# Patient Record
Sex: MALE | Race: WHITE | Employment: FULL TIME | URBAN - METROPOLITAN AREA
[De-identification: names, ages, dates, MRNs, and addresses within clinical notes are randomized per-mention and may not be internally consistent; named-entity substitution may affect disease eponyms.]

---

## 2022-06-23 ENCOUNTER — APPOINTMENT (OUTPATIENT)
Dept: CT IMAGING | Age: 57
DRG: 045 | End: 2022-06-23
Payer: COMMERCIAL

## 2022-06-23 ENCOUNTER — HOSPITAL ENCOUNTER (EMERGENCY)
Age: 57
Discharge: LEFT BEFORE DISCHARGE | DRG: 045 | End: 2022-06-23
Attending: EMERGENCY MEDICINE
Payer: COMMERCIAL

## 2022-06-23 ENCOUNTER — APPOINTMENT (OUTPATIENT)
Dept: GENERAL RADIOLOGY | Age: 57
DRG: 045 | End: 2022-06-23
Payer: COMMERCIAL

## 2022-06-23 VITALS
TEMPERATURE: 98 F | WEIGHT: 155 LBS | RESPIRATION RATE: 16 BRPM | HEART RATE: 80 BPM | OXYGEN SATURATION: 98 % | HEIGHT: 69 IN | BODY MASS INDEX: 22.96 KG/M2 | DIASTOLIC BLOOD PRESSURE: 73 MMHG | SYSTOLIC BLOOD PRESSURE: 155 MMHG

## 2022-06-23 DIAGNOSIS — R29.898 LEFT ARM WEAKNESS: ICD-10-CM

## 2022-06-23 DIAGNOSIS — R91.8 LUNG MASS: ICD-10-CM

## 2022-06-23 DIAGNOSIS — I63.511 CEREBROVASCULAR ACCIDENT (CVA) DUE TO OCCLUSION OF RIGHT MIDDLE CEREBRAL ARTERY (HCC): Primary | ICD-10-CM

## 2022-06-23 LAB
ALBUMIN SERPL-MCNC: 4.2 GM/DL (ref 3.4–5)
ALP BLD-CCNC: 66 IU/L (ref 40–129)
ALT SERPL-CCNC: 20 U/L (ref 10–40)
ANION GAP SERPL CALCULATED.3IONS-SCNC: 12 MMOL/L (ref 4–16)
AST SERPL-CCNC: 26 IU/L (ref 15–37)
BASOPHILS ABSOLUTE: 0.1 K/CU MM
BASOPHILS RELATIVE PERCENT: 0.7 % (ref 0–1)
BILIRUB SERPL-MCNC: 0.4 MG/DL (ref 0–1)
BUN BLDV-MCNC: 10 MG/DL (ref 6–23)
CALCIUM SERPL-MCNC: 9.5 MG/DL (ref 8.3–10.6)
CHLORIDE BLD-SCNC: 97 MMOL/L (ref 99–110)
CO2: 26 MMOL/L (ref 21–32)
CREAT SERPL-MCNC: 1 MG/DL (ref 0.9–1.3)
DIFFERENTIAL TYPE: ABNORMAL
EKG ATRIAL RATE: 100 BPM
EKG DIAGNOSIS: NORMAL
EKG P AXIS: 75 DEGREES
EKG P-R INTERVAL: 114 MS
EKG Q-T INTERVAL: 364 MS
EKG QRS DURATION: 84 MS
EKG QTC CALCULATION (BAZETT): 469 MS
EKG R AXIS: 69 DEGREES
EKG T AXIS: 60 DEGREES
EKG VENTRICULAR RATE: 100 BPM
EOSINOPHILS ABSOLUTE: 0.1 K/CU MM
EOSINOPHILS RELATIVE PERCENT: 0.9 % (ref 0–3)
GFR AFRICAN AMERICAN: >60 ML/MIN/1.73M2
GFR NON-AFRICAN AMERICAN: >60 ML/MIN/1.73M2
GLUCOSE BLD-MCNC: 101 MG/DL
GLUCOSE BLD-MCNC: 101 MG/DL (ref 70–99)
GLUCOSE BLD-MCNC: 105 MG/DL (ref 70–99)
HCT VFR BLD CALC: 46.3 % (ref 42–52)
HEMOGLOBIN: 15.3 GM/DL (ref 13.5–18)
IMMATURE NEUTROPHIL %: 0.3 % (ref 0–0.43)
INR BLD: 0.95 INDEX
LYMPHOCYTES ABSOLUTE: 1.9 K/CU MM
LYMPHOCYTES RELATIVE PERCENT: 15.9 % (ref 24–44)
MAGNESIUM: 1.9 MG/DL (ref 1.8–2.4)
MCH RBC QN AUTO: 31.7 PG (ref 27–31)
MCHC RBC AUTO-ENTMCNC: 33 % (ref 32–36)
MCV RBC AUTO: 95.9 FL (ref 78–100)
MONOCYTES ABSOLUTE: 1.2 K/CU MM
MONOCYTES RELATIVE PERCENT: 9.9 % (ref 0–4)
NUCLEATED RBC %: 0 %
PDW BLD-RTO: 12.7 % (ref 11.7–14.9)
PLATELET # BLD: 243 K/CU MM (ref 140–440)
PMV BLD AUTO: 9.9 FL (ref 7.5–11.1)
POTASSIUM SERPL-SCNC: 3.3 MMOL/L (ref 3.5–5.1)
PROTHROMBIN TIME: 12.2 SECONDS (ref 11.7–14.5)
RBC # BLD: 4.83 M/CU MM (ref 4.6–6.2)
SEGMENTED NEUTROPHILS ABSOLUTE COUNT: 8.6 K/CU MM
SEGMENTED NEUTROPHILS RELATIVE PERCENT: 72.3 % (ref 36–66)
SODIUM BLD-SCNC: 135 MMOL/L (ref 135–145)
TOTAL IMMATURE NEUTOROPHIL: 0.04 K/CU MM
TOTAL NUCLEATED RBC: 0 K/CU MM
TOTAL PROTEIN: 8.1 GM/DL (ref 6.4–8.2)
TROPONIN T: <0.01 NG/ML
WBC # BLD: 11.8 K/CU MM (ref 4–10.5)

## 2022-06-23 PROCEDURE — 70450 CT HEAD/BRAIN W/O DYE: CPT

## 2022-06-23 PROCEDURE — 71045 X-RAY EXAM CHEST 1 VIEW: CPT

## 2022-06-23 PROCEDURE — 93010 ELECTROCARDIOGRAM REPORT: CPT | Performed by: INTERNAL MEDICINE

## 2022-06-23 PROCEDURE — 99285 EMERGENCY DEPT VISIT HI MDM: CPT

## 2022-06-23 PROCEDURE — 85025 COMPLETE CBC W/AUTO DIFF WBC: CPT

## 2022-06-23 PROCEDURE — 83735 ASSAY OF MAGNESIUM: CPT

## 2022-06-23 PROCEDURE — 6360000004 HC RX CONTRAST MEDICATION: Performed by: EMERGENCY MEDICINE

## 2022-06-23 PROCEDURE — 85610 PROTHROMBIN TIME: CPT

## 2022-06-23 PROCEDURE — 93005 ELECTROCARDIOGRAM TRACING: CPT | Performed by: EMERGENCY MEDICINE

## 2022-06-23 PROCEDURE — 6370000000 HC RX 637 (ALT 250 FOR IP): Performed by: EMERGENCY MEDICINE

## 2022-06-23 PROCEDURE — 82962 GLUCOSE BLOOD TEST: CPT

## 2022-06-23 PROCEDURE — 80053 COMPREHEN METABOLIC PANEL: CPT

## 2022-06-23 PROCEDURE — 70496 CT ANGIOGRAPHY HEAD: CPT

## 2022-06-23 PROCEDURE — 84484 ASSAY OF TROPONIN QUANT: CPT

## 2022-06-23 RX ORDER — SODIUM CHLORIDE 9 MG/ML
INJECTION, SOLUTION INTRAVENOUS PRN
Status: DISCONTINUED | OUTPATIENT
Start: 2022-06-23 | End: 2022-06-23 | Stop reason: HOSPADM

## 2022-06-23 RX ORDER — SODIUM CHLORIDE 0.9 % (FLUSH) 0.9 %
5-40 SYRINGE (ML) INJECTION PRN
Status: DISCONTINUED | OUTPATIENT
Start: 2022-06-23 | End: 2022-06-23 | Stop reason: HOSPADM

## 2022-06-23 RX ORDER — SODIUM CHLORIDE 0.9 % (FLUSH) 0.9 %
5-40 SYRINGE (ML) INJECTION EVERY 12 HOURS SCHEDULED
Status: DISCONTINUED | OUTPATIENT
Start: 2022-06-23 | End: 2022-06-23 | Stop reason: HOSPADM

## 2022-06-23 RX ORDER — ASPIRIN 81 MG/1
324 TABLET, CHEWABLE ORAL ONCE
Status: COMPLETED | OUTPATIENT
Start: 2022-06-23 | End: 2022-06-23

## 2022-06-23 RX ADMIN — IOPAMIDOL 75 ML: 755 INJECTION, SOLUTION INTRAVENOUS at 17:00

## 2022-06-23 RX ADMIN — ASPIRIN 81 MG CHEWABLE TABLET 324 MG: 81 TABLET CHEWABLE at 18:08

## 2022-06-23 ASSESSMENT — PAIN - FUNCTIONAL ASSESSMENT: PAIN_FUNCTIONAL_ASSESSMENT: NONE - DENIES PAIN

## 2022-06-23 NOTE — ED NOTES
Neurologist from [facility] finished with neuro exam at this time. Requesting Dr. Onelia Griffith to bedside.       Natalie Masterson RN  06/23/22 2343

## 2022-06-23 NOTE — ED NOTES
Dr Melonie Gomez at bedside      [facility]  06/23/22 8200

## 2022-06-23 NOTE — ED NOTES
Blood sugar 101     [facility], [address] [facility]  06/23/22 5071

## 2022-06-23 NOTE — ED PROVIDER NOTES
Not on file   Substance and Sexual Activity    Alcohol use: Yes    Drug use: No    Sexual activity: Not on file   Other Topics Concern    Not on file   Social History Narrative    Not on file     Social Determinants of Health     Financial Resource Strain:     Difficulty of Paying Living Expenses: Not on file   Food Insecurity:     Worried About Running Out of Food in the Last Year: Not on file    John of Food in the Last Year: Not on file   Transportation Needs:     Lack of Transportation (Medical): Not on file    Lack of Transportation (Non-Medical): Not on file   Physical Activity:     Days of Exercise per Week: Not on file    Minutes of Exercise per Session: Not on file   Stress:     Feeling of Stress : Not on file   Social Connections:     Frequency of Communication with Friends and Family: Not on file    Frequency of Social Gatherings with Friends and Family: Not on file    Attends Yazidi Services: Not on file    Active Member of [address] [place] [address] or Organizations: Not on file    Attends Club or Organization Meetings: Not on file    Marital Status: Not on file   Intimate Partner Violence:     Fear of Current or Ex-Partner: Not on file    Emotionally Abused: Not on file    Physically Abused: Not on file    Sexually Abused: Not on file   Housing Stability:     Unable to Pay for Housing in the Last Year: Not on file    Number of [place] in the Last Year: Not on file    Unstable Housing in the Last Year: Not on file       ALLERGIES: Patient has no known allergies.     PHYSICAL EXAM:  VITAL SIGNS:   ED Triage Vitals   Enc Vitals Group      BP 06/23/22 1634 (!) 197/117      Heart Rate 06/23/22 1634 (!) 103      Resp 06/23/22 1634 20      Temp 06/23/22 1658 98 °F (36.7 °C)      Temp src --       SpO2 06/23/22 1634 96 %      Weight 06/23/22 1641 155 lb (70.3 kg)      Height 06/23/22 1641 5' 9\" (1.753 m)      Head Circumference --       Peak Flow --       Pain Score --       Pain Loc -- Pain Edu? --       Excl. in [address]? --      Constitutional:  Non-toxic appearance  HENT: Normocephalic, Atraumatic, Bilateral external ears normal, Oropharynx moist, No oral exudates, Nose normal.  Eyes:  PERRL, EOMI, Conjunctiva normal, No discharge. Neck: Normal range of motion, No tenderness, Supple, No stridor, No lymphadenopathy . Cardiovascular:  Normal heart rate, Normal rhythm  Pulmonary/Chest:  Normal breath sounds, No respiratory distress, No wheezing  Abdomen: Bowel sounds normal, Soft, No tenderness, No masses, No pulsatile masses  Extremities:  Normal range of motion, Intact distal pulses, No edema, No tenderness  Skin: Warm, Dry, No rash  Neurological:      Mental Status Exam:   Alert and oriented times three, follows commands, speech and language intact    Cranial Bfcwoy-BE-EMD Intact. Cranial nerve II  Visual acuity: normal  Cranial nerve III  Pupils: equal, round, reactive to light  Cranial nerves III, IV, VI  Extraocular Movements: intact  Cranial nerve V  Facial sensation: intact  Cranial nerve VII  Facial strength: intact  Cranial nerve VIII  Hearing: intact  Cranial nerve IX  Palate: intact  Cranial nerve XI  Shoulder shrug: intact  Cranial nerve XII  Tongue movement: normal    Motor:   Drift: absent  Motor exam: 4 out of 5 strength in the left upper extremity, 5 out of 5 strength in the right upper extremity and bilateral lower extremities  Tone: normal  Abnormal Movements: Absent    NIH Stroke Scale    1a  Level of consciousness: 0=alert; keenly responsive   1b. LOC questions:  0=Performs both tasks correctly   1c. LOC commands: 0=Performs both tasks correctly   2. Best Gaze: 0=normal   3. Visual: 0=No visual loss   4. Facial Palsy: 0=Normal symmetric movement   5a. Motor left arm: 0=No drift, limb holds 90 (or 45) degrees for full 10 seconds   5b.   Motor right arm: 0=No drift, limb holds 90 (or 45) degrees for full 10 seconds   6a. motor left le=No drift, limb holds 90 (or 45) degrees for full 10 seconds   6b  Motor right le=No drift, limb holds 90 (or 45) degrees for full 10 seconds   7. Limb Ataxia: 1=Present in one limb   8. Sensory: 0=Normal; no sensory loss   9. Best Language:  0=No aphasia, normal   10. Dysarthria: 0=Normal   11. Extinction and Inattention: 0=No abnormality   12. Distal motor function: 0=Normal    Total:   1       Sensory:  Light Touch  Right Upper Extremity: normal  Left Upper Extremity: normal  Right Lower Extremity: normal  Left Lower Extremity: normal    EKG Interpretation  Interpreted by me  No prior EKG to compare to  Rhythm: normal sinus  Rate: normal 100  Axis: normal  Ectopy: none  Conduction: normal  ST Segments: normal  T Waves: normal  Clinical Impression: normal sinus rhythm    Cardiac Monitor Strip Interpretation  Interpreted by me  Monitor strip interpreted for greater than 10 seconds  Rhythm: normal sinus  Rate: normal  Ectopy: none  ST Segments: normal      Radiology / Procedures:       XR CHEST PORTABLE (Final result)  Result time 22 17:38:21  Final result by Shawnie Canavan, MD (22 17:38:21)                Impression:    Scarring within both lung apices as well as a masslike airspace opacity   within the right upper lobe. Recommend CT for further evaluation. Narrative:    EXAMINATION:   ONE XRAY VIEW OF THE CHEST     2022 4:49 pm     COMPARISON:   None.      HISTORY:   ORDERING SYSTEM PROVIDED HISTORY: stroke symptoms   TECHNOLOGIST PROVIDED HISTORY:   Reason for exam:->stroke symptoms   Reason for Exam: stroke symptoms     FINDINGS:   There is scarring within both lung apices as well as a 3.9 cm in diameter   masslike airspace opacity within the right upper lobe.  No infiltrate or   effusion is identified.  The heart size is normal.                       CTA HEAD NECK W CONTRAST (Final result)  Result time 22 17:31:25  Final result by Owen Stevens MD (22 17:31:25)                Impression:    Anders Ho acute to subacute infarction in right frontal lobe. 75% stenosis in proximal P2 segment of the left PCA. No acute abnormality or flow-limiting stenosis of the remainder of major   arteries of head and neck. 2.1 cm partially visualized lung mass in the right upper lobe, suspicious for   neoplasm. Narrative:    EXAMINATION:   CTA OF THE HEAD AND NECK WITH CONTRAST 6/23/2022 5:00 pm:     TECHNIQUE:   CTA of the head and neck was performed with the administration of intravenous   contrast. Multiplanar reformatted images are provided for review.  MIP images   are provided for review. Stenosis of the internal carotid arteries measured   using NASCET criteria. Automated exposure control, iterative reconstruction,   and/or weight based adjustment of the mA/kV was utilized to reduce the   radiation dose to as low as reasonably achievable. COMPARISON:   Noncontrast CT head from earlier today     HISTORY:   ORDERING SYSTEM PROVIDED HISTORY: Left arm weakness   TECHNOLOGIST PROVIDED HISTORY:   Has a \"code stroke\" or \"stroke alert\" been called? ->Yes   Reason for exam:->Left arm weakness   Decision Support Exception - unselect if not a suspected or confirmed   emergency medical condition->Emergency Medical Condition (MA)   Reason for Exam: Left arm weakness     FINDINGS:     CTA NECK:     AORTIC ARCH/ARCH VESSELS: No dissection or arterial injury.  No significant   stenosis of the brachiocephalic or subclavian arteries. CAROTID ARTERIES: No dissection, arterial injury, or hemodynamically   significant stenosis by NASCET criteria. VERTEBRAL ARTERIES: No dissection, arterial injury, or significant stenosis.      SOFT TISSUES: There is a 2.1 cm partially visualized lung mass in the right   upper lobe, suspicious for neoplasm.  There is severe emphysema.  There is   bronchial wall thickening, may be related to small airway disease or   bronchiolitis.  No cervical or superior mediastinal lymphadenopathy.  The   larynx and pharynx are unremarkable.  No acute abnormality of the salivary   and thyroid glands. BONES: No acute osseous abnormality.  There are moderate degenerative changes   in the cervical spine. CTA HEAD:     ANTERIOR CIRCULATION: No significant stenosis of the intracranial internal   carotid, anterior cerebral, or middle cerebral arteries. No aneurysm. POSTERIOR CIRCULATION: There is 75% stenosis in proximal P2 segment of the   left PCA.  No significant stenosis of the vertebral, basilar, or posterior   cerebral arteries. No aneurysm. OTHER: No dural venous sinus thrombosis on this non-dedicated study. BRAIN: There is a tiny acute to subacute infarction in right frontal lobe. No mass effect or midline shift. No extra-axial fluid collection.                       CT HEAD WO CONTRAST (Edited Result - FINAL)  Result time 06/23/22 17:32:19  Addendum 1 of 1 by Fernando Pepper MD (06/23/22 17:32:19)    ADDENDUM:  Results were reported to Dr. Aries Sharp at 5:04 p.m. on June 23, 2022.               Final result by Fernando Pepper MD (06/23/22 17:01:17)                Impression:    A tiny acute to subacute infarction in the right frontal lobe in the right   MCA territory. Narrative:    EXAMINATION:   CT OF THE HEAD WITHOUT CONTRAST  6/23/2022 4:52 pm     TECHNIQUE:   CT of the head was performed without the administration of intravenous   contrast. Automated exposure control, iterative reconstruction, and/or weight   based adjustment of the mA/kV was utilized to reduce the radiation dose to as   low as reasonably achievable. COMPARISON:   CT head January 6, 2013     HISTORY:   ORDERING SYSTEM PROVIDED HISTORY: Left arm [facility]   TECHNOLOGIST PROVIDED HISTORY:   Has a \"code stroke\" or \"stroke alert\" been called? ->Yes   Reason for exam:->Left arm [employer]   Decision Support Exception - unselect if not a suspected or confirmed   emergency medical condition->Emergency Medical Condition (MA)   Reason for Exam: Left arm weankess     FINDINGS:   BRAIN/VENTRICLES: There is a tiny acute to subacute infarction in the right   frontal lobe in the right MCA territory (series 2, image 46). There is no acute intracranial hemorrhage, mass effect or midline shift.  No   abnormal extra-axial fluid collection.  The gray-white differentiation is   maintained without evidence of an acute infarct.  There is no evidence of   hydrocephalus. ORBITS: There are postoperative changes along the lateral wall of left orbit. The visualized portion of the orbits demonstrate no acute abnormality. SINUSES: The visualized paranasal sinuses and mastoid air cells demonstrate   no acute abnormality. SOFT TISSUES/SKULL:  No acute abnormality of the visualized skull or soft   tissues.                   Labs Reviewed   CBC WITH AUTO DIFFERENTIAL - Abnormal; Notable for the following components:       Result Value    WBC 11.8 (*)     MCH 31.7 (*)     Segs Relative 72.3 (*)     Lymphocytes % 15.9 (*)     Monocytes % 9.9 (*)     All other components within normal limits   COMPREHENSIVE METABOLIC PANEL W/ REFLEX TO MG FOR LOW K - Abnormal; Notable for the following components:    Potassium 3.3 (*)     Chloride 97 (*)     Glucose 105 (*)     All other components within normal limits   POCT GLUCOSE - Abnormal; Notable for the following components:    POC Glucose 101 (*)     All other components within normal limits   POCT GLUCOSE - Normal   TROPONIN   PROTIME-INR   MAGNESIUM       Stroke alert timing details  1. Physician evaluation performed at: 16:35  2. Stroke alert called at: 16:35  3. CT results obtained at: 17:04  4. Decision to administer tPA at: The patient is not a tPA candidate per stroke neurologist at [facility], Dr. Jennifer Castro, as he is outside the window in which we can administer tPA. ED COURSE & MEDICAL DECISION MAKING:  Pertinent Labs & Imaging studies reviewed.  (See chart for details)  On exam, the patient is afebrile and nontoxic appearing. He is hypertensive but is asymptomatic and this improves without treatment. He is mildly tachycardic on arrival but this resolves without treatment. He is otherwise hemodynamically stable. He has left upper extremity weakness but is otherwise neurologically intact. Point-of-care glucose is 101. Stroke alert was called and he was taken immediately for CT of the brain without contrast as well as CTA of the head and neck. EKG shows a normal sinus rhythm with no ST elevation or depression. Labs are obtained and are significant for mild leukocytosis and mild hypokalemia with no other clinically significant lab abnormalities. CT head without contrast shows a tiny acute to subacute infarction in the right frontal lobe in the right MCA territory. CTA head and neck shows 75% stenosis in the proximal P2 segment of the left PCA. There is no acute abnormality or flow-limiting stenosis in the remainder of the major arteries of the head or neck. The patient was evaluated by the stroke neurologist at [facility], Dr. Av Hill, over the telemetry robot and she states that he is not a tPA candidate as he is outside the window in which we can give tPA. She recommends admission for an MRI and further stroke work-up. The patient was treated with an aspirin. I discussed the results with the patient and recommended admission to which he stated he could not stay as he needed to go home and take care of his dog. He could not find any family to take care of his dog. He states he will come back tomorrow for admission and further work-up once he can make arrangements his dog cared for. The patient is awake, alert, and has the capacity to make medical decisions for himself. I discussed with the patient that he would need to sign out against medical advice.  We discussed that in signing out against medical advice, the patient is assuming all risk of a bad outcome including, but not limited to, worsening symptoms, chest pain, MI, abdominal pain, cardiac arrest, stroke, permanent disability, or death. The patient is able to repeat back to me the risks of signing out against medical advice. We discussed that they are to follow up with their physician or the physician on-call in 1 day. We discussed that they are welcome back to the Emergency Department at any time and are encouraged to return for persistent or worsening symptoms. The patient verbalized understanding and SIGNED [address]. Clinical Impression:  1. Cerebrovascular accident (CVA) due to occlusion of right middle cerebral artery ([address]    2. Left arm weakness    3. Lung mass        Disposition referral (if applicable):  [facility] Emergency Department  [address] [phone]  Go to   Please return as soon as possible      Disposition medications (if applicable): There are no discharge medications for this patient. Comment: Please note this report has been produced using speech recognition software and may contain errors related to that system including errors in grammar, punctuation, and spelling, as well as words and phrases that may be inappropriate. If there are any questions or concerns please feel free to contact the dictating provider for clarification.                  Bandar Deutsch MD  06/24/22 1468

## 2022-06-24 ENCOUNTER — APPOINTMENT (OUTPATIENT)
Dept: CT IMAGING | Age: 57
DRG: 045 | End: 2022-06-24
Payer: COMMERCIAL

## 2022-06-24 ENCOUNTER — HOSPITAL ENCOUNTER (INPATIENT)
Age: 57
LOS: 2 days | Discharge: HOME | DRG: 045 | End: 2022-06-26
Attending: HOSPITALIST
Payer: COMMERCIAL

## 2022-06-24 DIAGNOSIS — I63.511 ACUTE CEREBROVASCULAR ACCIDENT (CVA) DUE TO OCCLUSION OF RIGHT MIDDLE CEREBRAL ARTERY (HCC): Primary | ICD-10-CM

## 2022-06-24 DIAGNOSIS — R91.8 LUNG MASS: ICD-10-CM

## 2022-06-24 DIAGNOSIS — R91.8 MASS OF UPPER LOBE OF LEFT LUNG: ICD-10-CM

## 2022-06-24 DIAGNOSIS — R29.898 LEFT ARM WEAKNESS: ICD-10-CM

## 2022-06-24 PROBLEM — I63.232: Status: ACTIVE | Noted: 2022-06-24

## 2022-06-24 LAB
ALBUMIN SERPL-MCNC: 4 GM/DL (ref 3.4–5)
ALP BLD-CCNC: 60 IU/L (ref 40–129)
ALT SERPL-CCNC: 21 U/L (ref 10–40)
ANION GAP SERPL CALCULATED.3IONS-SCNC: 11 MMOL/L (ref 4–16)
AST SERPL-CCNC: 29 IU/L (ref 15–37)
BASOPHILS ABSOLUTE: 0.1 K/CU MM
BASOPHILS RELATIVE PERCENT: 0.8 % (ref 0–1)
BILIRUB SERPL-MCNC: 0.4 MG/DL (ref 0–1)
BUN BLDV-MCNC: 9 MG/DL (ref 6–23)
CALCIUM SERPL-MCNC: 9.3 MG/DL (ref 8.3–10.6)
CHLORIDE BLD-SCNC: 101 MMOL/L (ref 99–110)
CO2: 24 MMOL/L (ref 21–32)
CREAT SERPL-MCNC: 1 MG/DL (ref 0.9–1.3)
DIFFERENTIAL TYPE: ABNORMAL
EOSINOPHILS ABSOLUTE: 0.1 K/CU MM
EOSINOPHILS RELATIVE PERCENT: 1.2 % (ref 0–3)
GFR AFRICAN AMERICAN: >60 ML/MIN/1.73M2
GFR NON-AFRICAN AMERICAN: >60 ML/MIN/1.73M2
GLUCOSE BLD-MCNC: 101 MG/DL (ref 70–99)
GLUCOSE BLD-MCNC: 79 MG/DL (ref 70–99)
HCT VFR BLD CALC: 50.1 % (ref 42–52)
HEMOGLOBIN: 16 GM/DL (ref 13.5–18)
IMMATURE NEUTROPHIL %: 0.2 % (ref 0–0.43)
LYMPHOCYTES ABSOLUTE: 2 K/CU MM
LYMPHOCYTES RELATIVE PERCENT: 20.3 % (ref 24–44)
MCH RBC QN AUTO: 31.6 PG (ref 27–31)
MCHC RBC AUTO-ENTMCNC: 31.9 % (ref 32–36)
MCV RBC AUTO: 99 FL (ref 78–100)
MONOCYTES ABSOLUTE: 1.1 K/CU MM
MONOCYTES RELATIVE PERCENT: 11.3 % (ref 0–4)
NUCLEATED RBC %: 0 %
PDW BLD-RTO: 12.9 % (ref 11.7–14.9)
PLATELET # BLD: 271 K/CU MM (ref 140–440)
PMV BLD AUTO: 10.2 FL (ref 7.5–11.1)
POTASSIUM SERPL-SCNC: 3.7 MMOL/L (ref 3.5–5.1)
RBC # BLD: 5.06 M/CU MM (ref 4.6–6.2)
SEGMENTED NEUTROPHILS ABSOLUTE COUNT: 6.6 K/CU MM
SEGMENTED NEUTROPHILS RELATIVE PERCENT: 66.2 % (ref 36–66)
SODIUM BLD-SCNC: 136 MMOL/L (ref 135–145)
TOTAL IMMATURE NEUTOROPHIL: 0.02 K/CU MM
TOTAL NUCLEATED RBC: 0 K/CU MM
TOTAL PROTEIN: 8 GM/DL (ref 6.4–8.2)
WBC # BLD: 10 K/CU MM (ref 4–10.5)

## 2022-06-24 PROCEDURE — 71260 CT THORAX DX C+: CPT

## 2022-06-24 PROCEDURE — 2580000003 HC RX 258: Performed by: HOSPITALIST

## 2022-06-24 PROCEDURE — 82962 GLUCOSE BLOOD TEST: CPT

## 2022-06-24 PROCEDURE — 99285 EMERGENCY DEPT VISIT HI MDM: CPT

## 2022-06-24 PROCEDURE — 6370000000 HC RX 637 (ALT 250 FOR IP): Performed by: HOSPITALIST

## 2022-06-24 PROCEDURE — 1200000000 HC SEMI PRIVATE

## 2022-06-24 PROCEDURE — 85025 COMPLETE CBC W/AUTO DIFF WBC: CPT

## 2022-06-24 PROCEDURE — 6360000004 HC RX CONTRAST MEDICATION: Performed by: PHYSICIAN ASSISTANT

## 2022-06-24 PROCEDURE — 6370000000 HC RX 637 (ALT 250 FOR IP): Performed by: PHYSICIAN ASSISTANT

## 2022-06-24 PROCEDURE — 93005 ELECTROCARDIOGRAM TRACING: CPT | Performed by: EMERGENCY MEDICINE

## 2022-06-24 PROCEDURE — 80053 COMPREHEN METABOLIC PANEL: CPT

## 2022-06-24 RX ORDER — NICOTINE 21 MG/24HR
1 PATCH, TRANSDERMAL 24 HOURS TRANSDERMAL DAILY
Status: DISCONTINUED | OUTPATIENT
Start: 2022-06-25 | End: 2022-06-26 | Stop reason: HOSPADM

## 2022-06-24 RX ORDER — POTASSIUM CHLORIDE 7.45 MG/ML
10 INJECTION INTRAVENOUS PRN
Status: DISCONTINUED | OUTPATIENT
Start: 2022-06-24 | End: 2022-06-26 | Stop reason: HOSPADM

## 2022-06-24 RX ORDER — ONDANSETRON 4 MG/1
4 TABLET, ORALLY DISINTEGRATING ORAL EVERY 8 HOURS PRN
Status: DISCONTINUED | OUTPATIENT
Start: 2022-06-24 | End: 2022-06-26 | Stop reason: HOSPADM

## 2022-06-24 RX ORDER — SODIUM CHLORIDE 9 MG/ML
INJECTION, SOLUTION INTRAVENOUS CONTINUOUS
Status: DISCONTINUED | OUTPATIENT
Start: 2022-06-24 | End: 2022-06-26 | Stop reason: HOSPADM

## 2022-06-24 RX ORDER — ENOXAPARIN SODIUM 100 MG/ML
40 INJECTION SUBCUTANEOUS DAILY
Status: DISCONTINUED | OUTPATIENT
Start: 2022-06-25 | End: 2022-06-26 | Stop reason: HOSPADM

## 2022-06-24 RX ORDER — ONDANSETRON 2 MG/ML
4 INJECTION INTRAMUSCULAR; INTRAVENOUS EVERY 6 HOURS PRN
Status: DISCONTINUED | OUTPATIENT
Start: 2022-06-24 | End: 2022-06-26 | Stop reason: HOSPADM

## 2022-06-24 RX ORDER — POLYETHYLENE GLYCOL 3350 17 G/17G
17 POWDER, FOR SOLUTION ORAL DAILY PRN
Status: DISCONTINUED | OUTPATIENT
Start: 2022-06-24 | End: 2022-06-26 | Stop reason: HOSPADM

## 2022-06-24 RX ORDER — POTASSIUM CHLORIDE 20 MEQ/1
40 TABLET, EXTENDED RELEASE ORAL PRN
Status: DISCONTINUED | OUTPATIENT
Start: 2022-06-24 | End: 2022-06-26 | Stop reason: HOSPADM

## 2022-06-24 RX ORDER — ASPIRIN 81 MG/1
324 TABLET, CHEWABLE ORAL ONCE
Status: COMPLETED | OUTPATIENT
Start: 2022-06-24 | End: 2022-06-24

## 2022-06-24 RX ORDER — ASPIRIN 300 MG/1
300 SUPPOSITORY RECTAL DAILY
Status: DISCONTINUED | OUTPATIENT
Start: 2022-06-25 | End: 2022-06-26 | Stop reason: HOSPADM

## 2022-06-24 RX ORDER — ACETAMINOPHEN 650 MG/1
650 SUPPOSITORY RECTAL EVERY 6 HOURS PRN
Status: DISCONTINUED | OUTPATIENT
Start: 2022-06-24 | End: 2022-06-26 | Stop reason: HOSPADM

## 2022-06-24 RX ORDER — ASPIRIN 81 MG/1
81 TABLET ORAL DAILY
Status: DISCONTINUED | OUTPATIENT
Start: 2022-06-25 | End: 2022-06-26 | Stop reason: HOSPADM

## 2022-06-24 RX ORDER — ATORVASTATIN CALCIUM 40 MG/1
80 TABLET, FILM COATED ORAL NIGHTLY
Status: DISCONTINUED | OUTPATIENT
Start: 2022-06-24 | End: 2022-06-26

## 2022-06-24 RX ORDER — MAGNESIUM SULFATE IN WATER 40 MG/ML
2000 INJECTION, SOLUTION INTRAVENOUS PRN
Status: DISCONTINUED | OUTPATIENT
Start: 2022-06-24 | End: 2022-06-26 | Stop reason: HOSPADM

## 2022-06-24 RX ORDER — ACETAMINOPHEN 325 MG/1
650 TABLET ORAL EVERY 6 HOURS PRN
Status: DISCONTINUED | OUTPATIENT
Start: 2022-06-24 | End: 2022-06-26 | Stop reason: HOSPADM

## 2022-06-24 RX ADMIN — ASPIRIN 81 MG CHEWABLE TABLET 324 MG: 81 TABLET CHEWABLE at 19:09

## 2022-06-24 RX ADMIN — ATORVASTATIN CALCIUM 80 MG: 40 TABLET, FILM COATED ORAL at 22:14

## 2022-06-24 RX ADMIN — IOPAMIDOL 75 ML: 755 INJECTION, SOLUTION INTRAVENOUS at 21:05

## 2022-06-24 RX ADMIN — SODIUM CHLORIDE: 9 INJECTION, SOLUTION INTRAVENOUS at 22:19

## 2022-06-24 NOTE — ED PROVIDER NOTES
EMERGENCY DEPARTMENT ENCOUNTER      PCP: No primary care provider on file. CHIEF COMPLAINT    Chief Complaint   Patient presents with    Extremity Weakness     reports unable to use left hand and was advised he had a stroke yesterday     This patient was not evaluated by the attending physician. I have independently evaluated this patient. HPI    Romy Loomis is a 65 y.o. male who presents for admission. Patient states he was diagnosed with a stroke yesterday, was not able to stay for admission and MRI due to needing to go home to take care of his dog. Patient states he is now able to stay for further evaluation. Patient states symptoms started on 6/22 at 11 PM at night. Patient had left hand weakness and numbness. Patient states the numbness is improved however he has continued weakness to left hand. Patient denies chest pain, shortness of breath, abdominal pain, vomiting or diarrhea. REVIEW OF SYSTEMS   Constitutional:  Denies fever  Neurologic:  See HPI. Eyes:  Denies discharge, dipplopia, blurred vision, or loss visual field  HENT:  Denies sore throat or ear pain   Cardiovascular:  Denies chest pain, palpitations. Respiratory:  Denies shortness of breath, respiratory discomfort   GI  Denies abdominal pain. Denies vomiting, or diarrhea. :  Denies Dysuria or Hematuria. Musculoskeletal:  Denies back pain. Skin:  Denies rash   Lymphatic:  Denies swollen glands     All other review of systems negative at this time  See HPI and nursing notes for additional information      PAST MEDICAL & SURGICAL HISTORY    Past Medical History:   Diagnosis Date    Acute cerebrovascular accident (CVA) due to occlusion of left carotid artery ([facility] [address] 6/24/2022    TB (pulmonary tuberculosis) 1990     History reviewed. No pertinent surgical history.     CURRENT MEDICATIONS        ALLERGIES    No Known Allergies    SOCIAL & FAMILY HISTORY    Social History     Socioeconomic History    Marital status: Single     Spouse name: None    Number of children: None    Years of education: None    Highest education level: None   Occupational History    None   Tobacco Use    Smoking status: Current Every Day Smoker     Packs/day: 2.00    Smokeless tobacco: Former User   Substance and Sexual Activity    Alcohol use: Yes     Comment: social     Drug use: No    Sexual activity: None   Other Topics Concern    None   Social History Narrative    None     Social Determinants of Health     Financial Resource Strain:     Difficulty of Paying Living Expenses: Not on file   Food Insecurity:     Worried About Running Out of Food in the Last Year: Not on file    John of Food in the Last Year: Not on file   Transportation Needs:     Lack of Transportation (Medical): Not on file    Lack of Transportation (Non-Medical): Not on file   Physical Activity:     Days of Exercise per Week: Not on file    Minutes of Exercise per Session: Not on file   Stress:     Feeling of Stress : Not on file   Social Connections:     Frequency of Communication with Friends and Family: Not on file    Frequency of Social Gatherings with Friends and Family: Not on file    Attends Hinduism Services: Not on file    Active Member of [address] [place] [address] or Organizations: Not on file    Attends Club or Organization Meetings: Not on file    Marital Status: Not on file   Intimate Partner Violence:     Fear of Current or Ex-Partner: Not on file    Emotionally Abused: Not on file    Physically Abused: Not on file    Sexually Abused: Not on file   Housing Stability:     Unable to Pay for Housing in the Last Year: Not on file    Number of [place] in the Last Year: Not on file    Unstable Housing in the Last Year: Not on file     History reviewed. No pertinent family history.     PHYSICAL EXAM    VITAL SIGNS: BP (!) 175/88   Pulse 73   Temp 97.8 °F (36.6 °C)   Resp 25   Ht 5' 9\" (1.753 m)   Wt 155 lb (70.3 kg)   SpO2 96%   BMI 22.89 kg/m² Constitutional:  Well developed, well nourished, no acute distress   HENT:  Atraumatic. Eyes: Conjunctiva clear. No tearing. Pupils equally round and react to light, extraocular movement are intact. Neck: supple, no JVD. Cardiovascular: Tachycardic, normal rhythm  Respiratory:  Lungs Clear, no retractions   GI:  Soft, nontender, normal bowel sounds  Musculoskeletal:  No edema, no deformities  Integument:  Well hydrated, no petechiae   Neurologic:    - Alert & oriented person, place, time, and situation, no speech difficulties or slurring.  -Decreased  strength to left hand and 4/5 strength to left upper extremity compared to 5 out of 5 on the right  - Cranial nerves 2-12 grossly intact  - Sensation intact to light touch  - No meningeal signs  - Normal finger to nose test bilaterally  - Rapid alternating movements intact  - No pronator drift.   Psych: Pleasant affect, no hallucinations      LABS:   Results for orders placed or performed during the hospital encounter of 06/24/22   CBC with Auto Differential   Result Value Ref Range    WBC 10.0 4.0 - 10.5 K/CU MM    RBC 5.06 4.6 - 6.2 M/CU MM    Hemoglobin 16.0 13.5 - 18.0 GM/DL    Hematocrit 50.1 42 - 52 %    MCV 99.0 78 - 100 FL    MCH 31.6 (H) 27 - 31 PG    MCHC 31.9 (L) 32.0 - 36.0 %    RDW 12.9 11.7 - 14.9 %    Platelets 788 791 - 064 K/CU MM    MPV 10.2 7.5 - 11.1 FL    Differential Type AUTOMATED DIFFERENTIAL     Segs Relative 66.2 (H) 36 - 66 %    Lymphocytes % 20.3 (L) 24 - 44 %    Monocytes % 11.3 (H) 0 - 4 %    Eosinophils % 1.2 0 - 3 %    Basophils % 0.8 0 - 1 %    Segs Absolute 6.6 K/CU MM    Lymphocytes Absolute 2.0 K/CU MM    Monocytes Absolute 1.1 K/CU MM    Eosinophils Absolute 0.1 K/CU MM    Basophils Absolute 0.1 K/CU MM    Nucleated RBC % 0.0 %    Total Nucleated RBC 0.0 K/CU MM    Total Immature Neutrophil 0.02 K/CU MM    Immature Neutrophil % 0.2 0 - 0.43 %   Comprehensive Metabolic Panel   Result Value Ref Range    Sodium 136 135 - 145 MMOL/L    Potassium 3.7 3.5 - 5.1 MMOL/L    Chloride 101 99 - 110 mMol/L    CO2 24 21 - 32 MMOL/L    BUN 9 6 - 23 MG/DL    CREATININE 1.0 0.9 - 1.3 MG/DL    Glucose 79 70 - 99 MG/DL    Calcium 9.3 8.3 - 10.6 MG/DL    Albumin 4.0 3.4 - 5.0 GM/DL    Total Protein 8.0 6.4 - 8.2 GM/DL    Total Bilirubin 0.4 0.0 - 1.0 MG/DL    ALT 21 10 - 40 U/L    AST 29 15 - 37 IU/L    Alkaline Phosphatase 60 40 - 129 IU/L    GFR Non-African American >60 >60 mL/min/1.73m2    GFR African American >60 >60 mL/min/1.73m2    Anion Gap 11 4 - 16   POCT Glucose   Result Value Ref Range    POC Glucose 101 (H) 70 - 99 MG/DL       ED COURSE & MEDICAL DECISION MAKING      Patient presents as above. Patient states he is able to stay for admission and completion of stroke evaluation at this time. Patient also had lung mass on previous imaging. Repeat lab work is ordered. Patient provided aspirin. Consult hospitalist who accepts admission, does request that CT chest with contrast be added for further evaluation of lung mass. Clinical  IMPRESSION    1. Acute cerebrovascular accident (CVA) due to occlusion of right middle cerebral artery (HCC)    2. Lung mass    3. Left arm weakness      Patient admitted    Comment: Please note this report has been produced using speech recognition software and may contain errors related to that system including errors in grammar, punctuation, and spelling, as well as words and phrases that may be inappropriate. If there are any questions or concerns please feel free to contact the dictating provider for clarification.             Viv Olson PA-C  06/24/22 4903

## 2022-06-24 NOTE — ED NOTES
EKG completed on patient, POC glucose 101, [place],lactic acid and type & screen was drawn on patient, Urine cup given to patient     Shirin Bonilla  06/24/22 0239

## 2022-06-24 NOTE — ED TRIAGE NOTES
Patient reports he was advised to return to this facility today after being seen yesterday and diagnosed with a stroke. Patient reports he is unable to use his left hand properly. Patient holding wallet on arrival and does not appear to be in any distress.

## 2022-06-25 ENCOUNTER — APPOINTMENT (OUTPATIENT)
Dept: MRI IMAGING | Age: 57
DRG: 045 | End: 2022-06-25
Payer: COMMERCIAL

## 2022-06-25 ENCOUNTER — APPOINTMENT (OUTPATIENT)
Dept: ULTRASOUND IMAGING | Age: 57
DRG: 045 | End: 2022-06-25
Payer: COMMERCIAL

## 2022-06-25 PROBLEM — I63.9 ACUTE ISCHEMIC STROKE (HCC): Status: ACTIVE | Noted: 2022-06-24

## 2022-06-25 LAB
ALBUMIN SERPL-MCNC: 3.8 GM/DL (ref 3.4–5)
ALP BLD-CCNC: 65 IU/L (ref 40–128)
ALT SERPL-CCNC: 18 U/L (ref 10–40)
ANION GAP SERPL CALCULATED.3IONS-SCNC: 9 MMOL/L (ref 4–16)
AST SERPL-CCNC: 21 IU/L (ref 15–37)
BILIRUB SERPL-MCNC: 0.4 MG/DL (ref 0–1)
BUN BLDV-MCNC: 13 MG/DL (ref 6–23)
CALCIUM SERPL-MCNC: 8.8 MG/DL (ref 8.3–10.6)
CHLORIDE BLD-SCNC: 105 MMOL/L (ref 99–110)
CHOLESTEROL: 122 MG/DL
CO2: 26 MMOL/L (ref 21–32)
CREAT SERPL-MCNC: 0.9 MG/DL (ref 0.9–1.3)
ESTIMATED AVERAGE GLUCOSE: 123 MG/DL
GFR AFRICAN AMERICAN: >60 ML/MIN/1.73M2
GFR NON-AFRICAN AMERICAN: >60 ML/MIN/1.73M2
GLUCOSE BLD-MCNC: 89 MG/DL (ref 70–99)
HBA1C MFR BLD: 5.9 % (ref 4.2–6.3)
HCT VFR BLD CALC: 44.5 % (ref 42–52)
HDLC SERPL-MCNC: 34 MG/DL
HEMOGLOBIN: 14.3 GM/DL (ref 13.5–18)
LDL CHOLESTEROL CALCULATED: 59 MG/DL
MCH RBC QN AUTO: 30.8 PG (ref 27–31)
MCHC RBC AUTO-ENTMCNC: 32.1 % (ref 32–36)
MCV RBC AUTO: 95.7 FL (ref 78–100)
PDW BLD-RTO: 12.9 % (ref 11.7–14.9)
PLATELET # BLD: 235 K/CU MM (ref 140–440)
PMV BLD AUTO: 10 FL (ref 7.5–11.1)
POTASSIUM SERPL-SCNC: 3.6 MMOL/L (ref 3.5–5.1)
RBC # BLD: 4.65 M/CU MM (ref 4.6–6.2)
SODIUM BLD-SCNC: 140 MMOL/L (ref 135–145)
TOTAL PROTEIN: 6.5 GM/DL (ref 6.4–8.2)
TRIGL SERPL-MCNC: 143 MG/DL
WBC # BLD: 9 K/CU MM (ref 4–10.5)

## 2022-06-25 PROCEDURE — 36415 COLL VENOUS BLD VENIPUNCTURE: CPT

## 2022-06-25 PROCEDURE — 97530 THERAPEUTIC ACTIVITIES: CPT

## 2022-06-25 PROCEDURE — 1200000000 HC SEMI PRIVATE

## 2022-06-25 PROCEDURE — 2580000003 HC RX 258: Performed by: HOSPITALIST

## 2022-06-25 PROCEDURE — 97166 OT EVAL MOD COMPLEX 45 MIN: CPT

## 2022-06-25 PROCEDURE — 92610 EVALUATE SWALLOWING FUNCTION: CPT

## 2022-06-25 PROCEDURE — 94761 N-INVAS EAR/PLS OXIMETRY MLT: CPT

## 2022-06-25 PROCEDURE — 99222 1ST HOSP IP/OBS MODERATE 55: CPT | Performed by: STUDENT IN AN ORGANIZED HEALTH CARE EDUCATION/TRAINING PROGRAM

## 2022-06-25 PROCEDURE — 83036 HEMOGLOBIN GLYCOSYLATED A1C: CPT

## 2022-06-25 PROCEDURE — 80053 COMPREHEN METABOLIC PANEL: CPT

## 2022-06-25 PROCEDURE — 99223 1ST HOSP IP/OBS HIGH 75: CPT | Performed by: INTERNAL MEDICINE

## 2022-06-25 PROCEDURE — 97162 PT EVAL MOD COMPLEX 30 MIN: CPT

## 2022-06-25 PROCEDURE — 6370000000 HC RX 637 (ALT 250 FOR IP): Performed by: HOSPITALIST

## 2022-06-25 PROCEDURE — A9579 GAD-BASE MR CONTRAST NOS,1ML: HCPCS | Performed by: NURSE PRACTITIONER

## 2022-06-25 PROCEDURE — 85027 COMPLETE CBC AUTOMATED: CPT

## 2022-06-25 PROCEDURE — 80061 LIPID PANEL: CPT

## 2022-06-25 PROCEDURE — 70553 MRI BRAIN STEM W/O & W/DYE: CPT

## 2022-06-25 PROCEDURE — 93880 EXTRACRANIAL BILAT STUDY: CPT

## 2022-06-25 PROCEDURE — 6360000004 HC RX CONTRAST MEDICATION: Performed by: NURSE PRACTITIONER

## 2022-06-25 PROCEDURE — 97116 GAIT TRAINING THERAPY: CPT

## 2022-06-25 RX ORDER — CLOPIDOGREL BISULFATE 75 MG/1
75 TABLET ORAL DAILY
Status: DISCONTINUED | OUTPATIENT
Start: 2022-06-26 | End: 2022-06-26 | Stop reason: HOSPADM

## 2022-06-25 RX ADMIN — ATORVASTATIN CALCIUM 80 MG: 40 TABLET, FILM COATED ORAL at 23:09

## 2022-06-25 RX ADMIN — GADOTERIDOL 14 ML: 279.3 INJECTION, SOLUTION INTRAVENOUS at 13:02

## 2022-06-25 RX ADMIN — SODIUM CHLORIDE 950 ML: 9 INJECTION, SOLUTION INTRAVENOUS at 19:38

## 2022-06-25 RX ADMIN — ASPIRIN 81 MG: 81 TABLET, COATED ORAL at 08:30

## 2022-06-25 ASSESSMENT — ENCOUNTER SYMPTOMS
COUGH: 0
NAUSEA: 0
ABDOMINAL PAIN: 0
WHEEZING: 0
BACK PAIN: 0
CONSTIPATION: 0
DIARRHEA: 0
SHORTNESS OF BREATH: 0
VOMITING: 0

## 2022-06-25 NOTE — H&P
History and Physical      Name:  Jeannette Montoya /Age/Sex: 1965  (65 y.o. male)   MRN & CSN:  [id number] & [id number] Admission Date/Time: 2022  6:16 PM   Location:  ED30/ED-30 PCP: No primary care provider on file. Hospital Day: 1    Assessment and Plan:   Jeannette Montoya is a 65 y.o.  male  who presents with Acute cerebrovascular accident (CVA) due to occlusion of left carotid artery [facility])    CVA  -Patient presented to the ER yesterday with left arm weakness but left AMA. -CT of the head on 623 reveals tiny acute to subacute infarction right frontal lobe and the right MCA territory. -CTA of the head and neck shows 75% stenosis in the proximal P2 segment of the left PCA. -Admit under stroke protocol consult neurology. -MRI ordered    Lung mass  -2.1 cm lung mass of the right upper lobe suspicious for neoplasm discovered yesterday on CTA of the head and neck. -Requested ER to a dedicated CT of the chest for further affirmation.  -Consulting pulmonary and IR for tissue biopsy    Tobacco abuse  -Patient smokes 2 packs/day for the last 40 years  -NicoDerm and advised complete review reviewed cessation    Diet No diet orders on file   DVT Prophylaxis [x] Lovenox, []  Heparin, [] SCDs, [] Ambulation   GI Prophylaxis [x] PPI,  [] H2 Blocker,  [] Carafate,  [] Diet/Tube Feeds   Code Status No Order   Disposition Patient requires continued admission due to CVA   MDM [] Low, [] Moderate,[x]  High  Patient's risk as above due to CVA     History of Present Illness:     Chief Complaint: Acute cerebrovascular accident (CVA) due to occlusion of left carotid artery ([facility] [address]  Jeannette Montoya is a 65 y.o.  male  who presents with left arm and hand weakness. Patient was seen yesterday in the ER and a stroke alert was called. Patient was found to have small right frontal lobe infarction in the right MCA territory. Patient was advised further admission but decided to leave AMA as he had to take care of his dog. Patient returns today to the ER with no improvement in the symptoms. He denies any fever chills or rigors. He denies any nausea or vomiting. He denies any headache or vision loss or diplopia. He denies any chest pain or shortness of breath. Denies any dysuria hematuria melena hematochezia. In the ER today his labs show sodium 135, potassium 3.3, chloride 97 bicarb 26 blood urea 10 creatinine 1 glucose 95 calcium 9.5 total protein 8.1. Troponin T less than 0.010 liver function test are within normal limits. Hematology count showed WBC of 10 hemoglobin 16 hematocrit 50 and platelet count of 259. Coagulation profile is within normal limits with INR 1.95 and PT of 12. 2. X-ray from 623 shows scarring within both lung apices as well as masslike airspace opacity. Twelve-lead ECG shows sinus tachycardia with biatrial enlargement. Patient will be admitted to the hospital service under stroke protocol. N.p.o. till passes swallow evaluation. Start with aspirin. Continue IV fluids 2D echo. Statins as per protocol. Consulting neurology pulmonary and PT OT. IR for lung biopsy. Further therapy would depend hospital course the patient. Overall prognosis remains guarded. Ten point ROS reviewed negative, unless as noted above    Objective:   No intake or output data in the 24 hours ending 06/24/22 2051   Vitals:   Vitals:    06/24/22 1900   BP: (!) 176/90   Pulse: 89   Resp: 20   Temp:    SpO2:      Physical Exam:   GEN Awake male, sitting upright in bed in no apparent distress. Appears given age. EYES Pupils are equally round. No scleral erythema, discharge, or conjunctivitis. HENT Mucous membranes are moist. Oral pharynx without exudates, no evidence of thrush. NECK Supple, no apparent thyromegaly or masses. RESP Clear to auscultation, no wheezes, rales or rhonchi. Symmetric chest movement while on room air. CARDIO/VASC S1/S2 auscultated. Regular rate without appreciable murmurs, rubs, or gallops. No JVD or carotid bruits. Peripheral pulses equal bilaterally and palpable. No peripheral edema. GI Abdomen is soft without significant tenderness, masses, or guarding. Bowel sounds are normoactive. Rectal exam deferred.  No costovertebral angle tenderness. Normal appearing external genitalia. Moore catheter is not present. HEME/LYMPH No palpable cervical lymphadenopathy and no hepatosplenomegaly. No petechiae or ecchymoses. MSK No gross joint deformities. SKIN Normal coloration, warm, dry. NEURO Cranial nerves appear grossly intact, normal speech, left arm and hand weakness noted  PSYCH Awake, alert, oriented x 4. Affect appropriate. Past Medical History:      Past Medical History:   Diagnosis Date    Acute cerebrovascular accident (CVA) due to occlusion of left carotid artery ([facility] [address] 6/24/2022    TB (pulmonary tuberculosis) 1990     PSHX:  has no past surgical history on file. Allergies: No Known Allergies    FAM HX: family history is not on file. Soc HX:   Social History     Socioeconomic History    Marital status: Single     Spouse name: None    Number of children: None    Years of education: None    Highest education level: None   Occupational History    None   Tobacco Use    Smoking status: Current Every Day Smoker     Packs/day: 2.00    Smokeless tobacco: Former User   Substance and Sexual Activity    Alcohol use: Yes     Comment: social     Drug use: No    Sexual activity: None   Other Topics Concern    None   Social History Narrative    None     Social Determinants of Health     Financial Resource Strain:     Difficulty of Paying Living Expenses: Not on file   Food Insecurity:     Worried About Running Out of Food in the Last Year: Not on file    John of Food in the Last Year: Not on file   Transportation Needs:     Lack of Transportation (Medical): Not on file    Lack of Transportation (Non-Medical):  Not on file   Physical Activity:     Days of Exercise per Week: Not on file    Minutes of Exercise per Session: Not on file   Stress:     Feeling of Stress : Not on file   Social Connections:     Frequency of Communication with Friends and Family: Not on file    Frequency of Social Gatherings with Friends and Family: Not on file    Attends Taoist Services: Not on file    Active Member of [address] [place] [address] or Organizations: Not on file    Attends Club or Organization Meetings: Not on file    Marital Status: Not on file   Intimate Partner Violence:     Fear of Current or Ex-Partner: Not on file    Emotionally Abused: Not on file    Physically Abused: Not on file    Sexually Abused: Not on file   Housing Stability:     Unable to Pay for Housing in the Last Year: Not on file    Number of [place] in the Last Year: Not on file    Unstable Housing in the Last Year: Not on file       Data:   CBC with Differential:    Lab Results   Component Value Date    WBC 10.0 06/24/2022    RBC 5.06 06/24/2022    HGB 16.0 06/24/2022    HCT 50.1 06/24/2022     06/24/2022    MCV 99.0 06/24/2022    MCH 31.6 06/24/2022    MCHC 31.9 06/24/2022    RDW 12.9 06/24/2022    SEGSPCT 66.2 06/24/2022    LYMPHOPCT 20.3 06/24/2022    MONOPCT 11.3 06/24/2022    BASOPCT 0.8 06/24/2022    MONOSABS 1.1 06/24/2022    LYMPHSABS 2.0 06/24/2022    EOSABS 0.1 06/24/2022    BASOSABS 0.1 06/24/2022    DIFFTYPE AUTOMATED DIFFERENTIAL 06/24/2022       CMP:     Lab Results   Component Value Date     06/24/2022    K 3.7 06/24/2022     06/24/2022    CO2 24 06/24/2022    BUN 9 06/24/2022    CREATININE 1.0 06/24/2022    GFRAA >60 06/24/2022    LABGLOM >60 06/24/2022    GLUCOSE 79 06/24/2022    PROT 8.0 06/24/2022    LABALBU 4.0 06/24/2022    CALCIUM 9.3 06/24/2022    BILITOT 0.4 06/24/2022    ALKPHOS 60 06/24/2022    AST 29 06/24/2022    ALT 21 06/24/2022       Troponin:  Lab Results   Component Value Date    TROPONINT <0.010 06/23/2022       U/A:  No results found for: NITRITE, COLORUSushant, LABCAST, WBCUA, RBCUA, MUCUS, TRICHOMONAS, YEAST, BACTERIA, CLARITYU, SPECGRAV, LEUKOCYTESUR, UROBILINOGEN, BILIRUBINUR, BLOODU, GLUCOSEU, AMORPHOUS    Urine Culture:  No components found for: KARLEE    Radiology results:  CT CHEST W CONTRAST    (Results Pending)         Medications:   Medications:    Infusions:   PRN Meds:       Electronically signed by Tai Sen MD on 6/24/2022 at 8:51 PM

## 2022-06-25 NOTE — ED NOTES
Report called to Lloyd Pichardo at this time, all questions answered at this time.       Radha Velez RN  06/24/22 2051

## 2022-06-25 NOTE — CONSULTS
Neurology Service Consult Note  [facility]   Patient Name: Miko Cox  : 1965        Subjective:   Reason for consult: \"My left side went completely numb yesterday at work\"  65 y.o. right-handed male with past medical history of TB presenting to [facility] for the second time in 24 hours for left arm and leg weakness with numbness that started 1 day prior to this exam.  Patient states that he was at work he was leaving the break room when suddenly he lost complete feeling in his left arm left leg he felt like he was going to fall that he realized he was very clumsy in those extremities as well. He came to the emergency room the CT of the head did show a subacute infarct he could not stay overnight as he does not live with anybody he has a dog they needed to take care of thus he went home and then he came back for his full work-up. During the work-up, and after completion of the CT of the chest patient was found to have a new onset lung mass that radiology is believing to be primary lung cancer lesion, patient does not have primary care provider he is not sure if he has high blood pressure high cholesterol or diabetes, he does smoke cigarettes. He states the left arm is still clumsy during my exam but he denies any facial droop aphasia or dysarthria. No family at the bedside. Does express to me that he will need to go home and take care of his dog today. Past Medical History:   Diagnosis Date    Acute cerebrovascular accident (CVA) due to occlusion of left carotid artery ([facility] [address] 2022    TB (pulmonary tuberculosis)     :   History reviewed. No pertinent surgical history.   Medications:  Scheduled Meds:   aspirin  81 mg Oral Daily    Or    aspirin  300 mg Rectal Daily    atorvastatin  80 mg Oral Nightly    enoxaparin  40 mg SubCUTAneous Daily    nicotine  1 patch TransDERmal Daily     Continuous Infusions:   sodium chloride 75 mL/hr at 22 4637     PRN Meds:.ondansetron **OR** ondansetron, potassium chloride **OR** potassium alternative oral replacement **OR** potassium chloride, magnesium sulfate, acetaminophen **OR** acetaminophen, polyethylene glycol    No Known Allergies  Social History     Socioeconomic History    Marital status: Single     Spouse name: Not on file    Number of children: Not on file    Years of education: Not on file    Highest education level: Not on file   Occupational History    Not on file   Tobacco Use    Smoking status: Current Every Day Smoker     Packs/day: 2.00    Smokeless tobacco: Former User   Substance and Sexual Activity    Alcohol use: Yes     Comment: social     Drug use: No    Sexual activity: Not on file   Other Topics Concern    Not on file   Social History Narrative    Not on file     Social Determinants of Health     Financial Resource Strain:     Difficulty of Paying Living Expenses: Not on file   Food Insecurity:     Worried About Running Out of Food in the Last Year: Not on file    John of Food in the Last Year: Not on file   Transportation Needs:     Lack of Transportation (Medical): Not on file    Lack of Transportation (Non-Medical):  Not on file   Physical Activity:     Days of Exercise per Week: Not on file    Minutes of Exercise per Session: Not on file   Stress:     Feeling of Stress : Not on file   Social Connections:     Frequency of Communication with Friends and Family: Not on file    Frequency of Social Gatherings with Friends and Family: Not on file    Attends [employer] Services: Not on file    Active Member of Clubs or Organizations: Not on file    Attends Club or Organization Meetings: Not on file    Marital Status: Not on file   Intimate Partner Violence:     Fear of Current or Ex-Partner: Not on file    Emotionally Abused: Not on file    Physically Abused: Not on file    Sexually Abused: Not on file   Housing Stability:     Unable to Pay for Housing in the Last Year: Not on file    Number of [facility] in the Last Year: Not on file    Unstable Housing in the Last Year: Not on file      History reviewed. No pertinent family history. ROS (10 systems)  In addition to that documented in the HPI above, the additional ROS was obtained:  Constitutional: Denies fevers or chills  Eyes: Denies vision changes  ENMT: Denies sore throat  CV: Denies chest pain  Resp: Denies SOB  GI: Denies vomiting or diarrhea  : Denies painful urination  MSK: Denies recent trauma  Skin: Denies new rashes  Neuro: Denies new numbness or tingling or weakness  Endocrine: Denies unexpected weight loss  Heme: Denies bleeding disorders    Physical Exam:       [unfilled]   Wt Readings from Last 3 Encounters:   06/24/22 155 lb (70.3 kg)   06/23/22 155 lb (70.3 kg)     Temp Readings from Last 3 Encounters:   06/25/22 98.3 °F (36.8 °C) (Oral)   06/23/22 98 °F (36.7 °C)     BP Readings from Last 3 Encounters:   06/25/22 (!) 166/90   06/23/22 (!) 155/73     Pulse Readings from Last 3 Encounters:   06/25/22 74   06/23/22 80        Gen: A&O x 4, NAD, cooperative  HEENT: NC/AT, EOMI, PERRL, mmm, neck supple, no meningeal signs; Heart: Regular  Lungs: No distress  Ext: no edema, no calf tenderness b/l  Psych: normal mood and affect  Skin: no rashes or lesions    NEUROLOGIC EXAM:    Mental Status: A&O to self, location, month and year, NAD, speech clear, language fluent, repetition and naming intact, follows commands appropriately    Cranial Nerve Exam:   CN II-XII:  PERRL, VFF, no nystagmus, no gaze paresis, sensation V1-V3 intact b/l, muscles of facial expression symmetric; hearing intact to conversational tone, palate elevates symmetrically, shoulder elevation symmetric and tongue protrudes midline with movement side to side.     Motor Exam:       Strength 5/5 UE's/LE's b/l  Tone and bulk normal   No pronator drift    Deep Tendon Reflexes: 2/4 biceps, brachioradialis, patellar, and achilles b/l; flexor plantar responses b/l    Sensation: Intact light touch/temperature UE's/LE's b/l    Coordination/Cerebellum:       Tremors--none      Rapidly alternating movements: Severe dysdiadochokinesia left upper extremity          Heel-to-Shin: no dysmetria b/l      Finger-to-Nose: no dysmetria b/l    Gait and stance:      Gait: deferred      LABS:     Recent Labs     06/23/22  1637 06/24/22  1536 06/25/22  0347   WBC 11.8* 10.0 9.0    136 140   K 3.3* 3.7 3.6   CL 97* 101 105   CO2 26 24 26   BUN 10 9 13   CREATININE 1.0 1.0 0.9   GLUCOSE 105* 79 89   INR 0.95  --   --          IMAGING:      CT Head:  A tiny acute to subacute infarction in the right frontal lobe in the right   MCA territory. CTA head and neck:  Tiny acute to subacute infarction in right frontal lobe.       75% stenosis in proximal P2 segment of the left PCA.       No acute abnormality or flow-limiting stenosis of the remainder of major   arteries of head and neck.       2.1 cm partially visualized lung mass in the right upper lobe, suspicious for   neoplasm. MRI brain w/wo  pending      ASSESSMENT/PLAN:     3 62-year-old male with left arm and leg numbness and weakness secondary to suspected right MCA infarct superimposed on newly found lung mass, will rule out metastasis etiology as well. Plan of care as follows:  1. Neuro exam:  1. Left upper extremity pronator drift  2. Neurodiagnostics:  1. MRI brain with and without pending  2. CTA head neck as above  3. Dismiss permissive HTN at this time   3. Medications:  1. Aspirin 81 mg and nightly statin  4. PT/OT/OT:  1. Per their recommendation  5. Follow-up:  1. Pending completion of neurodiagnostics        Thank you for allowing us to participate in the care of your patient. If there are any questions regarding evaluation please feel free to contact us. ALANA Medrano - CNP, 6/25/2022     Attending Note:  This is a shared/split visit with ALANA Winston.  I have reviewed the chart and we have discussed this case in

## 2022-06-25 NOTE — CONSULTS
Pulmonary Consult Note      Reason for Consult: Lung mass  Requesting Physician: Mykel Bashir MD  Subjective:   CHIEF COMPLAINT :left arm weakness    Patient Active Problem List    Diagnosis Date Noted    Acute cerebrovascular accident (CVA) due to occlusion of left carotid artery ([facility] [address] 06/24/2022     Priority: Medium        HPI:                The patient is a 65 y.o. male with significant past medical history of CVA with left carotid occlusion, Pulmonary tuberculosis  presents with complaints of left arm and left hand weakness. He still has the weakness but slightly better. He has a 100 pk yr smoking and still smoking 2 pks/ day. He had a CT head which showed small right frontal lobe infarction in the right MCA territory. He had a CT chest which showed:    1.  There is a mass in the right upper lobe and spiculated nodule in the left   upper lobe suspicious for primary lung cancer.  Recommend further evaluation   with PET CT and biopsy if not previously worked up.       2. Jahaira Guzman increased lymphoid tissue at the right hilum but patient also has   pulmonary emphysema and old granulomatous disease. He has no fever,no headaches, no hemoptysis,no n/v, no abd pain, no diarrhea, no dysuria. At this time he is on the Abx. And antiplatelet agents, being followed by a Neurologist. He is sitting in the bed. He is not in acute resp distress. Past Medical History:      Diagnosis Date    Acute cerebrovascular accident (CVA) due to occlusion of left carotid artery ([facility] [address] 6/24/2022    TB (pulmonary tuberculosis) 1990      Past Surgical History:    History reviewed. No pertinent surgical history. Current Medications:     aspirin  81 mg Oral Daily    Or    aspirin  300 mg Rectal Daily    atorvastatin  80 mg Oral Nightly    enoxaparin  40 mg SubCUTAneous Daily    nicotine  1 patch TransDERmal Daily     Allergies:    Social History:    TOBACCO:   reports that he has been smoking.  He has been smoking about 2.00 packs per day. He has quit using smokeless tobacco.  ETOH:   reports current alcohol use. Patient currently lives independently    Family History:   History reviewed. No pertinent family history. REVIEW OF SYSTEMS:    CONSTITUTIONAL:  negative for fevers, chills, diaphoresis, activity change, appetite change, fatigue, night sweats and unexpected weight change. EYES:  negative for blurred vision, eye discharge, visual disturbance and icterus  HEENT:  negative for hearing loss, tinnitus, ear drainage, sinus pressure, nasal congestion, epistaxis and snoring  RESPIRATORY:  See HPI  CARDIOVASCULAR:  negative for chest pain, palpitations, exertional chest pressure/discomfort, edema, syncope  GASTROINTESTINAL:  negative for nausea, vomiting, diarrhea, constipation, blood in stool and abdominal pain  GENITOURINARY:  negative for frequency, dysuria, urinary incontinence, decreased urine volume, and hematuria  HEMATOLOGIC/LYMPHATIC:  negative for easy bruising, bleeding and lymphadenopathy  ALLERGIC/IMMUNOLOGIC:  negative for recurrent infections, angioedema, anaphylaxis and drug reactions  ENDOCRINE:  negative for weight changes and diabetic symptoms including polyuria, polydipsia and polyphagia  MUSCULOSKELETAL:  negative for  pain, joint swelling, decreased range of motion and muscle weakness  NEUROLOGICAL:  negative for headaches, slurred speech, unilateral weakness  PSYCHIATRIC/BEHAVIORAL: negative for hallucinations, behavioral problems, confusion and agitation.      Objective:   PHYSICAL EXAM:      VITALS:  BP (!) 145/90   Pulse 90   Temp 98.6 °F (37 °C) (Oral)   Resp 25   Ht 5' 9\" (1.753 m)   Wt 155 lb (70.3 kg)   SpO2 95%   BMI 22.89 kg/m²   24HR INTAKE/OUTPUT:      Intake/Output Summary (Last 24 hours) at 2022 1138  Last data filed at 2022 2317  Gross per 24 hour   Intake 69.9 ml   Output --   Net 69.9 ml     CURRENT PULSE OXIMETRY:  SpO2: 95 %  24HR PULSE OXIMETRY RANGE:  SpO2  Av.5 %  Min: 95 %  Max: 96 %    CONSTITUTIONAL:  awake, alert, cooperative, no apparent distress, and appears stated age  NECK:  Supple, symmetrical, trachea midline, no adenopathy, thyroid symmetric, not enlarged and no tenderness, skin normal  LUNGS:  Occasional exp wheeze  CARDIOVASCULAR:  normal S1 and S2, no edema and no JVD  ABDOMEN:  normal bowel sounds, non-distended and no masses palpated, and no tenderness to palpation. No hepatospleenomegaly  LYMPHADENOPATHY:  no axillary or supraclavicular adenopathy. No cervical adnenopathy  PSYCHIATRIC: Oriented to person place and time. No obvious depression or anxiety. MUSCULOSKELETAL: No obvious misalignment or effusion of the joints. No clubbing, cyanosis of the digits. SKIN:  normal skin color, texture, turgor and no redness, warmth, or swelling.  No palpable nodules    DATA:    Old records have been reviewed  CBC with Differential:    Lab Results   Component Value Date    WBC 9.0 06/25/2022    RBC 4.65 06/25/2022    HGB 14.3 06/25/2022    HCT 44.5 06/25/2022     06/25/2022    MCV 95.7 06/25/2022    MCH 30.8 06/25/2022    MCHC 32.1 06/25/2022    RDW 12.9 06/25/2022    SEGSPCT 66.2 06/24/2022    LYMPHOPCT 20.3 06/24/2022    MONOPCT 11.3 06/24/2022    BASOPCT 0.8 06/24/2022    MONOSABS 1.1 06/24/2022    LYMPHSABS 2.0 06/24/2022    EOSABS 0.1 06/24/2022    BASOSABS 0.1 06/24/2022    DIFFTYPE AUTOMATED DIFFERENTIAL 06/24/2022     BMP:    Lab Results   Component Value Date     06/25/2022    K 3.6 06/25/2022     06/25/2022    CO2 26 06/25/2022    BUN 13 06/25/2022    CREATININE 0.9 06/25/2022    CALCIUM 8.8 06/25/2022    GFRAA >60 06/25/2022    LABGLOM >60 06/25/2022    GLUCOSE 89 06/25/2022     Hepatic Function Panel:    Lab Results   Component Value Date    ALKPHOS 65 06/25/2022    ALT 18 06/25/2022    AST 21 06/25/2022    PROT 6.5 06/25/2022    BILITOT 0.4 06/25/2022     ABG:  No results found for: [id number], BEART, [id number], PHART, THGBART, MJZ2BPV, PO2ART, KAH1SNT    Cultures:   Pending    Radiology Review:    Scarring within both lung apices as well as a masslike airspace opacity   within the right upper lobe.       Recommend CT for further evaluation     1.  There is a mass in the right upper lobe and spiculated nodule in the left   upper lobe suspicious for primary lung cancer.  Recommend further evaluation   with PET CT and biopsy if not previously worked up.       2. Jennifer Dec increased lymphoid tissue at the right hilum but patient also has   pulmonary emphysema and old granulomatous disease     A tiny acute to subacute infarction in the right frontal lobe in the right   MCA territory     Tiny acute to subacute infarction in right frontal lobe.       75% stenosis in proximal P2 segment of the left PCA.       No acute abnormality or flow-limiting stenosis of the remainder of major   arteries of head and neck.       2.1 cm partially visualized lung mass in the right upper lobe, suspicious for   neoplasm. Assessment/Plan       Patient Active Problem List    Diagnosis Date Noted    Acute cerebrovascular accident (CVA) due to occlusion of left carotid artery ([facility]) 06/24/2022     Priority: Medium     COPD  Acute Hypoxic resp failure  RUL Mass  CHUY Spiculated nodule suspicious for lung ca  CVA with left arm weakness ? Sec to tiny subacute infarction of the right frontal lobe    PLAN  1. OP PET  2. RUL Lung mass CT guided biopsy  3. Inhalers  4. PT/OT  5. CVA per Neurology  6. Keep sats > 92%  7. DVT and GI Prophylaxis  8.  C/w present management  Electronically signed by Robert Nelson MD on 6/25/2022 at 11:38 AM

## 2022-06-25 NOTE — PROGRESS NOTES
[address] [facility] ACUTE CARE OCCUPATIONAL THERAPY EVALUATION    Kevin Michele, 1965, 3019/[id number]-A, 6/25/2022    Discharge Recommendation: Home w/ OP OT      History:  [place]:  The primary encounter diagnosis was Acute cerebrovascular accident (CVA) due to occlusion of right middle cerebral artery ([address] Diagnoses of Lung mass and Left arm weakness were also pertinent to this visit. Past Medical History:   Diagnosis Date    Acute cerebrovascular accident (CVA) due to occlusion of left carotid artery ([address] 6/24/2022    TB (pulmonary tuberculosis) 1990         Subjective:  Patient states:  \"This L hand is my only problem\"  Pain: Pt reported no pain  Communication with other providers: co-eval w/ PT, handoff to RN  Restrictions: General Precautions, Fall Risk    Home Setup/Prior level of function:  Social/Functional History  Lives With: Alone  Type of Home: Apartment  Home Layout: One level (12 steps with a handrail to access bed/bath)  Home Access: Stairs to enter with rails  Entrance Stairs - Number of Steps: 15  Bathroom Shower/Tub: Tub/Shower unit  Bathroom Toilet: Standard  ADL Assistance: Independent  Homemaking Assistance: Independent  Ambulation Assistance: Independent  Transfer Assistance: Independent  Active : Yes  Occupation: Full time employment  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  · Vision: [facility]  · Hearing: WFL  · Vitals: Stable vitals throughout session on room air      [address] and functions:  · ROM: WFL   · Strength: R UE grossly 5/5 across all major joints, L UE 4/5   · Sensation: WFL B UEs  · Tone: Normal  · Coordination: WFL R UE, F- GM/FM coordination L UE  · Perception: WNL      Cognitive and Psychosocial Functioning:  · Overall cognitive status: alert and oriented, WFL  · Affect: Normal       Functional Mobility:  · Bed mobility:  IND  · Sitting balance:  IND Static and dynamic  · Transfers: IND STS  from EOB  · Standing balance:  IND static and dynamic w/o AD  · Functional Mobility: IND w/o AD long functional household distance  · Toilet/Shower Transfers: IND        Activities of Daily Living (ADLs):  · Feeding: IND  · Grooming: IND  · UB bathing: IND  · LB bathing: IND  · UB dressing: IND  · LB dressing: IND  · Toileting: IND    *ADL determined per observation of functional mobility, balance, activity tolerance, cognition, or actual ADL performance. AM-PAC 6 click short form for inpatient daily activity:   How much help from another person does the patient currently need. .. Unable  Dep A Lot  Max A A Lot   Mod A A Little  Min A A Little   CGA  SBA None   Mod I  Indep  Sup   1. Putting on and taking off regular lower body clothing? [] 1    [] 2   [] 2   [] 3   [] 3   [x] 4      2. Bathing (including washing, rinsing, drying)? [] 1   [] 2   [] 2 [] 3 [] 3 [x] 4   3. Toileting, which includes using toilet, bedpan, or urinal? [] 1    [] 2   [] 2   [] 3   [] 3   [x] 4     4. Putting on and taking off regular upper body clothing? [] 1   [] 2   [] 2   [] 3   [] 3    [x] 4      5. Taking care of personal grooming such as brushing teeth? [] 1   [] 2    [] 2 [] 3    [] 3   [x] 4      6. Eating meals? [] 1   [] 2   [] 2   [] 3   [] 3   [x] 4        Raw Score:  24     [24=0% impaired(CH), 23=1-19%(CI), 20-22=20-39%(CJ), 15-19=40-59%(CK), 10-14=60-79%(CL), 7-9=80-99%(CM), 6=100%(CN)]     Treatment:    Therapeutic Activity Training:   Therapeutic activity training was instructed today. Cues were given for safety, sequence, UE/LE placement, awareness, and balance. Activities performed today included bed mobility training, sup-sit, sit-stand, ambulation long functional household distance w/o AD.     Educated pt on role of OT, therapy POC and functional goals, progression w/ ADLs and transfers,  d/c recommendations     Safety Measures: Gait belt used, Left seated EOB, All needs met  Recommendations for NURSING activity:  Up to chair for all 3 meals and up to bathroom for all toileting needs     Assessment:  Pt is a 65 y o M admitted d/t acute CVA d/t occlusion of L carotid artery. Pt at baseline is IND for ADLs, IND for high level IADLs, and IND for functional transfers/mobility w/o AD. Pt currently presents at/near baseline w/ ADLs and transfers/ambulation, but demo residual L UE weakness and coordination deficits. Pt would benefit from continued acute care OT services w/ discharge to home w/ OP OT. Complexity: Moderate  Prognosis: Good, no significant barriers to participation at this time. Plan  Times per Week: 2  Current Treatment Recommendations: Strengthening,ROM,Self-Care / ADL,Cognitive/Perceptual training,Coordination training,Sensory integraion,Home management training,Positioning,Safety education & training,Neuromuscular re-education,Patient/Caregiver education & training,Equipment evaluation, education, & procurement         Goals:  · Pt will perform therex/theract in order to increase L UE strength necessary for ADL routine.   · Pt will improve L UE GM/FM coordination to G- in order to increase ease of ADL tasks including buttoning shirts, tying shoes, etc.     Pt goal: go home, get stronger  Time Frame for STGs: discharge    Equipment: defer    Time:   Time in: 1331  Time out: 1605  Total time: 34  Timed treatment minutes: 24      Electronically signed by:      MELANY Parr/TAMIKO  [id number]

## 2022-06-25 NOTE — PROGRESS NOTES
Physical Therapy  Facility/Department: [address] [place] [address]  Physical Therapy Initial Assessment    Name: Vivian Gonzalez  : 1965  MRN: [id number]  Date of Service: 2022    Discharge Recommendations:  Outpatient PT,Home with assist PRN       Functional Outcome Measure:    Acute Care Index of Function (ACIF):    Score: 1.00 (0.71 or greater = appropriate for home with OP PT and assist prn)          Assessment   Assessment: Pt is a 65 y.o. male with medical history, surgical history, co-morbidities, and personal factors including Acute cerebrovascular accident (CVA) due to occlusion of left carotid artery, TB (pulmonary tuberculosis), and tobacco use with admission for Acute cerebrovascular accident (CVA) due to occlusion of right middle cerebral artery, Lung mass, and Left arm weakness. Prior to admission, pt was independent with functional mobility and ADLs. Examination of body systems reveals decreased LUE strength, impaired LUE coordination, and L knee pain which limit his overall functional mobility. Therapy Prognosis: Good  Decision Making: Medium Complexity  Clinical Presentation: evolving  Requires PT Follow-Up: Yes  Activity Tolerance  Activity Tolerance: Patient tolerated evaluation without incident     Plan   Plan  Plan: 3-5 times per week  Current Treatment Recommendations: Strengthening,ROM,Balance training,Functional mobility training,Transfer training,ADL/Self-care training,IADL training,Endurance training,Equipment evaluation, education, & procurement,Pain management,Gait training,Stair training,Positioning,Neuromuscular re-education,Home exercise program,Safety education & training,Therapeutic activities,Patient/Caregiver education & training  Safety Devices  Type of Devices:  All fall risk precautions in place,Bed alarm in place,Left in bed,Call light within reach,Nurse notified,Gait belt     Restrictions  Restrictions/Precautions  Restrictions/Precautions: General Precautions     Subjective General  Chart Reviewed: Yes  Patient assessed for rehabilitation services?: Yes  Family / Caregiver Present: No  Follows Commands: Within Functional Limits  Subjective  Subjective: pain: denies; 0/10 (chronic intermittent L knee pain)         Social/Functional History  Social/Functional History  Lives With: Alone  Type of Home: Apartment  Home Layout: One level (12 steps with a handrail to access bed/bath)  Home Access: Stairs to enter with rails  Entrance Stairs - Number of Steps: 15  Bathroom Shower/Tub: Tub/Shower unit  Bathroom Toilet: Standard  ADL Assistance: Independent  Homemaking Assistance: Independent  Ambulation Assistance: Independent  Transfer Assistance: Independent  Active : Yes  Occupation: Full time employment  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     Vision/Hearing  Vision  Vision: Within Functional Limits  Hearing  Hearing: Within functional limits    Cognition   Orientation  Overall Orientation Status: Within Normal Limits  Cognition  Overall Cognitive Status: WNL     Objective         Gross Assessment  Coordination:  (impaired on LUE)        Strength RLE  Strength RLE: WFL  Strength LLE  Strength LLE: WFL  Strength RUE  Strength RUE: WFL  Strength LUE  Comment: functionally impaired; see OT note for details           Bed mobility  Rolling to Left: Independent  Rolling to Right: Independent  Supine to Sit: Independent  Sit to Supine: Independent  Transfers  Sit to Stand: Independent  Stand to sit:  Independent  Ambulation  Surface: level tile  Device: No Device  Assistance: Supervision  Quality of Gait: normal gait speed  Distance: 200 feet + 200 feet  Comments: with initial verbal cues for directions in order to successfully navigate hallway and return to correct room; pt educated on and demonstrated understanding of importance of regular OOB mobility/ambulation with nursing staff and/or therapy staff throughout remainder of hospital stay  Stairs/Curb  Stairs?: Yes  Stairs  # Steps : 14  Rails: Left ascending  Assistance: Supervision;Stand by assistance     Balance  Posture: Fair  Sitting - Static: Good  Sitting - Dynamic: Good  Standing - Static: Good  Standing - Dynamic: Good          Gait Training:  Cues were given for safety, sequence, device management, balance, posture, awareness, path. Therapeutic Activity Training:   Therapeutic activity training was instructed today. Pt educated on and demonstrated understanding of importance of regular OOB mobility/ambulation with nursing staff and/or therapy staff throughout remainder of hospital stay. Pt educated on and demonstrated understanding of role of outpatient PT to address new LUE weakness s/p CVA as well as chronic L knee pain. AM-PAC Score  AM-PAC Inpatient Mobility Raw Score : 24 (06/25/22 1347)  AM-PAC Inpatient T-Scale Score : 61.14 (06/25/22 1347)  Mobility Inpatient CMS 0-100% Score: 0 (06/25/22 1347)  Mobility Inpatient CMS G-Code Modifier : [address] (06/25/22 1347)          Goals  Long Term Goals  Time Frame for Long term goals :  In one week:  Long term goal 1: Pt will ambulate 1200 feet independently  Long term goal 2: Pt will independently ascend/descend 20 steps with a handrail  Long term goal 3: Pt will independently complete 3 sets of 10 reps of BLE and BUE AROM exercises in available and allowed ROM       Education  Patient Education  Education Given To: Patient  Education Provided: Role of Therapy;IADL Safety;Plan of Care;ADL Adaptive Strategies;Transfer Training  Education Method: Demonstration;Verbal  Education Outcome: Verbalized understanding;Demonstrated understanding      Time In: 1331  Time Out: 1605  Total Treatment Time: 34  Timed Code Treatment Minutes: [facility], PT, DPT  License #: [id number]

## 2022-06-25 NOTE — PROGRESS NOTES
V2.0  [facility]ist Progress Note      Name:  Fab Scott /Age/Sex: 1965  (65 y.o. male)   MRN & CSN:  [id number] & [id number] Encounter Date/Time: 2022 5:41 PM EDT    Location:  [facility] PCP: No primary care provider on file. Hospital Day: 2    Assessment and Plan:   Fab Scott is a 65 y.o. male  who presents with Acute ischemic stroke [facility]      Plan:    CVA  Patient presented to the ER yesterday with left arm weakness but left AMA. CT of the head on 623 reveals tiny acute to subacute infarction right frontal lobe and the right MCA territory. CTA of the head and neck shows 75% stenosis in the proximal P2 segment of the left PCA. Admit under stroke protocol consult neurology. MRI ordered confirmed 8mm lacunar infarct in the right precentral gyrus. Waiting on further reporting to determine if there is anything to suggest metastases. Carotid doppler did not show any significant stenosis. Echo is still pending. Symptoms improved throughout the day though still not back to normal.    --> Given his ABCD score, will proceed with dual antiplatelet therapy for 21 days then continue with aspirin afterwards.       Lung mass  2.1 cm lung mass of the right upper lobe suspicious for neoplasm discovered yesterday on CTA of the head and neck. Requested ER to a dedicated CT of the chest for further affirmation. Consulting pulmonary and IR for tissue biopsy. Patient is an 80 pack year smoker.       Tobacco abuse  80 pack years . .. still smokes  Spent 5 minutes in smoking cessation counseling especially in light of likely diagnosis of lung cancer. NicoDerm applied. Diet ADULT DIET; Regular   DVT Prophylaxis [x] Lovenox, []  Heparin, [] SCDs, [] Ambulation,  [] Eliquis, [] Xarelto   Code Status Full Code   Disposition Patient requires continued admission due to ongoing workup on lung lesion and stroke.    Surrogate Decision Maker/ POA Patient has not decided     Subjective:     Chief Complaint: Extremity Weakness (reports unable to use left hand and was advised he had a stroke yesterday)       Left arm numbness improved now mostly only involving the left hand. No other complaints. Review of Systems:    Review of Systems   Constitutional: Negative for chills and fever. Respiratory: Negative for cough, shortness of breath and wheezing. Cardiovascular: Negative for chest pain and palpitations. Gastrointestinal: Negative for abdominal pain, constipation, diarrhea, nausea and vomiting. Genitourinary: Negative for frequency and urgency. Musculoskeletal: Negative for back pain and myalgias. Neurological: Positive for weakness and numbness. Psychiatric/Behavioral: The patient is nervous/anxious. Objective: Intake/Output Summary (Last 24 hours) at 6/25/2022 1741  Last data filed at 6/24/2022 2317  Gross per 24 hour   Intake 69.9 ml   Output --   Net 69.9 ml        Vitals:   Vitals:    06/25/22 1546   BP: (!) 154/84   Pulse: 71   Resp: 20   Temp:    SpO2: 92%       Physical Exam:     Physical Exam  Vitals and nursing note reviewed. Constitutional:       General: He is not in acute distress. Appearance: Normal appearance. He is normal weight. He is not ill-appearing, toxic-appearing or diaphoretic. HENT:      Head: Normocephalic and atraumatic. Nose: Nose normal. No congestion. Mouth/Throat:      Mouth: Mucous membranes are moist.      Pharynx: No posterior oropharyngeal erythema. Eyes:      General: No scleral icterus. Conjunctiva/sclera: Conjunctivae normal.      Pupils: Pupils are equal, round, and reactive to light. Neck:      Vascular: No carotid bruit. Cardiovascular:      Rate and Rhythm: Normal rate and regular rhythm. Heart sounds: Normal heart sounds. No murmur heard. No friction rub. No gallop. Pulmonary:      Effort: Pulmonary effort is normal. No respiratory distress. Breath sounds: Normal breath sounds. No wheezing. Abdominal:      General: Abdomen is flat. Bowel sounds are normal. There is no distension. Tenderness: There is no abdominal tenderness. There is no guarding. Musculoskeletal:         General: No deformity or signs of injury. Normal range of motion. Cervical back: Normal range of motion and neck supple. No rigidity or tenderness. Skin:     General: Skin is warm and dry. Neurological:      Mental Status: He is alert and oriented to person, place, and time. Cranial Nerves: No cranial nerve deficit. Sensory: Sensory deficit (left hand to above wrist) present. Motor: Weakness (left  ) present. Psychiatric:         Mood and Affect: Mood normal.         Behavior: Behavior normal.         Thought Content:  Thought content normal.         Judgment: Judgment normal.           Medications:   Medications:    [START ON 6/26/2022] clopidogrel  75 mg Oral Daily    aspirin  81 mg Oral Daily    Or    aspirin  300 mg Rectal Daily    atorvastatin  80 mg Oral Nightly    enoxaparin  40 mg SubCUTAneous Daily    nicotine  1 patch TransDERmal Daily      Infusions:    sodium chloride 75 mL/hr at 06/24/22 2317     PRN Meds: ondansetron, 4 mg, Q8H PRN   Or  ondansetron, 4 mg, Q6H PRN  potassium chloride, 40 mEq, PRN   Or  potassium alternative oral replacement, 40 mEq, PRN   Or  potassium chloride, 10 mEq, PRN  magnesium sulfate, 2,000 mg, PRN  acetaminophen, 650 mg, Q6H PRN   Or  acetaminophen, 650 mg, Q6H PRN  polyethylene glycol, 17 g, Daily PRN        Labs      Recent Results (from the past 24 hour(s))   CBC    Collection Time: 06/25/22  3:47 AM   Result Value Ref Range    WBC 9.0 4.0 - 10.5 K/CU MM    RBC 4.65 4.6 - 6.2 M/CU MM    Hemoglobin 14.3 13.5 - 18.0 GM/DL    Hematocrit 44.5 42 - 52 %    MCV 95.7 78 - 100 FL    MCH 30.8 27 - 31 PG    MCHC 32.1 32.0 - 36.0 %    RDW 12.9 11.7 - 14.9 %    Platelets 289 279 - 239 K/CU MM    MPV 10.0 7.5 - 11.1 FL   Hemoglobin A1c    Collection Time: 06/25/22  3:47 AM   Result Value Ref Range    Hemoglobin A1C 5.9 4.2 - 6.3 %    eAG 123 mg/dL   Lipid panel - fasting    Collection Time: 06/25/22  3:47 AM   Result Value Ref Range    Triglycerides 143 <150 MG/DL    Cholesterol 122 <200 MG/DL    HDL 34 (L) >40 MG/DL    LDL Calculated 59 <100 MG/DL   Comprehensive Metabolic Panel w/ Reflex to MG    Collection Time: 06/25/22  3:47 AM   Result Value Ref Range    Sodium 140 135 - 145 MMOL/L    Potassium 3.6 3.5 - 5.1 MMOL/L    Chloride 105 99 - 110 mMol/L    CO2 26 21 - 32 MMOL/L    BUN 13 6 - 23 MG/DL    CREATININE 0.9 0.9 - 1.3 MG/DL    Glucose 89 70 - 99 MG/DL    Calcium 8.8 8.3 - 10.6 MG/DL    Albumin 3.8 3.4 - 5.0 GM/DL    Total Protein 6.5 6.4 - 8.2 GM/DL    Total Bilirubin 0.4 0.0 - 1.0 MG/DL    ALT 18 10 - 40 U/L    AST 21 15 - 37 IU/L    Alkaline Phosphatase 65 40 - 128 IU/L    GFR Non-African American >60 >60 mL/min/1.73m2    GFR African American >60 >60 mL/min/1.73m2    Anion Gap 9 4 - 16        Imaging/Diagnostics Last 24 Hours   CT CHEST W CONTRAST    Result Date: 6/24/2022  EXAMINATION: CT OF THE CHEST WITH CONTRAST 6/24/2022 9:04 pm TECHNIQUE: CT of the chest was performed with the administration of intravenous contrast. Multiplanar reformatted images are provided for review. Automated exposure control, iterative reconstruction, and/or weight based adjustment of the mA/kV was utilized to reduce the radiation dose to as low as reasonably achievable. COMPARISON: Current chest x-ray. HISTORY: ORDERING SYSTEM PROVIDED HISTORY: lung mass TECHNOLOGIST PROVIDED HISTORY: Reason for exam:->lung mass Decision Support Exception - unselect if not a suspected or confirmed emergency medical condition->Emergency Medical Condition (MA) Reason for Exam: lung mass FINDINGS: Mediastinum: Cardiac chambers are not enlarged. Thoracic aorta is not aneurysmal.  The mediastinal lymph nodes are not enlarged.   There is some mildly increased lymphoid tissue in the right hilum measuring up to a 1.4 x 1 cm. Few small calcified lymph nodes are present in the jose as well. Lungs/pleura: Pulmonary emphysema is present with blebs, bulla, and has pleuroparenchymal scarring in the lung apices. In the right upper lobe is a 2.0 x 1.5 x 1.4 cm soft tissue mass with a lobular margin and adjacent scarring. In the left upper lobe is a 1.3 x 0.9 x 1.0 cm mass the slightly spiculated margin. Other areas of calcified granulomas are present within both lungs. There is no consolidation to suggest pneumonia. No pleural effusion or pneumothorax on either side. Upper Abdomen: Only a small portion of the upper abdomen is included. No free air free fluid at the upper abdomen. No focal masses are seen at the included portion of the adrenal glands. Soft Tissues/Bones: Schmorl's nodes along the thoracic endplates but there is no diffuse lytic or blastic lesions in the bones. 1.  There is a mass in the right upper lobe and spiculated nodule in the left upper lobe suspicious for primary lung cancer. Recommend further evaluation with PET CT and biopsy if not previously worked up. 2.  Mildly increased lymphoid tissue at the right hilum but patient also has pulmonary emphysema and old granulomatous disease. VL DUP CAROTID BILATERAL    Result Date: 6/25/2022  EXAMINATION: ULTRASOUND EVALUATION OF THE CAROTID ARTERIES 6/25/2022 TECHNIQUE: Duplex ultrasound using B-mode/gray scaled imaging, Doppler spectral analysis and color flow Doppler was obtained of the carotid arteries. COMPARISON: None. HISTORY: ORDERING SYSTEM PROVIDED HISTORY: Acute CVA TECHNOLOGIST PROVIDED HISTORY: Reason for exam:->Acute CVA Reason for Exam: CVA FINDINGS: RIGHT: The right common carotid artery demonstrates peak systolic velocities of 665 and 80 cm/sec in the proximal and distal segments respectively.  The right internal carotid artery demonstrates the systolic velocities of 74, 70, and 96 cm/sec in the proximal, mid and distal segments respectively. The external carotid artery is patent. The vertebral artery demonstrates normal antegrade flow. Atherosclerotic plaque is visualized in the proximal, mid, and distal common carotid artery and proximal internal carotid artery. ICA/CCA ratio of 0.96 LEFT: The left common carotid artery demonstrates peak systolic velocities of 85 and 83 cm/sec in the proximal and distal segments respectively. The left internal carotid artery demonstrates the systolic velocities of 91, 71, and 94 cm/sec in the proximal, mid and distal segments respectively. The external carotid artery is patent. The vertebral artery demonstrates normal antegrade flow. Atherosclerotic plaque is visualized at the carotid bulb and in the proximal internal carotid artery. ICA/CCA ratio of 1.11     The right internal carotid artery demonstrates 0-50% stenosis. The left internal carotid artery demonstrates 0-50% stenosis. Bilateral vertebral arteries are patent with flow in the normal direction. MRI BRAIN W WO CONTRAST    Addendum Date: 6/25/2022    ADDENDUM: No focus of abnormal enhancement is detected. No evidence of intracranial metastatic disease. Result Date: 6/25/2022  EXAMINATION: MRI OF THE BRAIN WITHOUT AND WITH CONTRAST  6/25/2022 12:51 pm TECHNIQUE: Multiplanar multisequence MRI of the head/brain was performed without and with the administration of intravenous contrast. COMPARISON: None. HISTORY: ORDERING SYSTEM PROVIDED HISTORY: Stroke versus metastasis TECHNOLOGIST PROVIDED HISTORY: Reason for exam:->Stroke versus metastasis What is the sedation requirement?->None Reason for Exam: stroke vs mets FINDINGS: INTRACRANIAL STRUCTURES/VENTRICLES: 8 mm focus of restricted diffusion within the right precentral gyrus is likely suggestive of acute infarction. No mass effect or midline shift. No evidence of an acute intracranial hemorrhage. The ventricles and sulci are normal in size and configuration.   The sellar/suprasellar regions appear unremarkable. The normal signal voids within the major intracranial vessels appear maintained. No postcontrast images are available. There are mild nonspecific foci of periventricular and subcortical cerebral white matter T2/FLAIR hyperintensity, most likely representing chronic microangiopathic disease in this age group. ORBITS: The visualized portion of the orbits demonstrate no acute abnormality. SINUSES: The visualized paranasal sinuses and mastoid air cells demonstrate no acute abnormality. BONES/SOFT TISSUES: The bone marrow signal intensity appears normal. The soft tissues demonstrate no acute abnormality. No postcontrast images are available. Therefore evaluation for metastatic disease is not feasible. Once they become available an addendum will be made. 8 mm focus of acute lacunar infarction within the right precentral gyrus. . No intracranial hemorrhage or mass lesion. Mild chronic microangiopathic ischemic disease. The findings were sent to the Radiology Results [id number] at 3:47 pm on 6/25/2022 to be communicated to a licensed caregiver.        Electronically signed by Neris Stewart MD on 6/25/2022 at 5:41 PM

## 2022-06-25 NOTE — PROGRESS NOTES
Speech Language Pathology  Facility/Department: [facility] EVALUATION    NAME: Jasmine Merritt  : 1965  MRN: [id number]    ADMISSION DATE: 2022  ADMITTING DIAGNOSIS: has Acute cerebrovascular accident (CVA) due to occlusion of left carotid artery ([address] on their problem list.  ONSET DATE: this admission    Recent Chest Xray/CT of Chest: (22 )    Date of Eval: 2022  Evaluating Therapist: Paulino Hurst SLP      IMPRESSIONS AND RECOMMENDATIONS: Jasmine Merritt was referred for a bedside swallow evaluation after being admitted to [facility] with a CVA. No past hx of dysphagia was noted. Medical hx includes CVA, TB, and long time smoker. Pt was seen for an evaluation seated upright in bed. Pt was alert and cooperative throughout the evaluation. Oral mechanism exam revealed that the oral mechanism is intact. Pt was presented with PO trials of thin liquids, purees, and regular solids. Oral phase appears WFL at the bedside characterized by adequate mastication of solids. Pharyngeal phase appears WFL at the bedside characterized by adequate laryngeal elevation and proper timing of the pharyngeal swallow. No over s/s of aspiration were noted throughout any PO trials this date. Recommend regular diet with thin liquids. Recommend general aspiration precautions. No further ST needs identified. Results and recommendations d/w pt and RN. Current Diet level:  Current Diet : Regular      Primary Complaint       Pain:  Pain Assessment  Pain Assessment: None - Denies Pain    Reason for Referral  Jasmine Merritt was referred for a bedside swallow evaluation to assess the efficiency of his swallow function, identify signs and symptoms of aspiration and make recommendations regarding safe dietary consistencies, effective compensatory strategies, and safe eating environment.     Impression  Dysphagia Diagnosis: Swallow function appears WFL  Dysphagia Impression : appears WFL at bedside  Dysphagia Outcome Severity Scale: Level 6: Within functional limits/Modified independence     Treatment Plan  Requires SLP Intervention: No  Duration of Treatment: n/a  D/C Recommendations: To be determined       Recommended Diet and Intervention        Recommended Form of Meds: PO          Compensatory Swallowing Strategies  Compensatory Swallowing Strategies : Alternate solids and liquids;Eat/Feed slowly;Upright as possible for all oral intake;Remain upright for 30-45 minutes after meals;Small bites/sips    Treatment/Goals  Short-term Goals  Goal 1: n/a    General  Chart Reviewed: Yes  Comments: CVA  Behavior/Cognition: Alert; Cooperative;Pleasant mood  Temperature Spikes Noted: No  Respiratory Status: Room air  O2 Device: None (Room air)  Communication Observation: Functional  Follows Directions: Simple  Dentition: Adequate  Patient Positioning: Upright in bed  Baseline Vocal Quality: Normal  Volitional Cough: Strong  Volitional Swallow:  (present)  Prior Dysphagia History: none  Consistencies Administered: Regular;Pureed; Thin           Vision/Hearing  Vision  Vision: Within Functional Limits  Hearing  Hearing: Within functional limits    Oral Motor Deficits       Oral Phase Dysfunction  Oral Phase  Oral Phase: WNL     Indicators of Pharyngeal Phase Dysfunction        Prognosis  Individuals consulted  Consulted and agree with results and recommendations: RN;Patient  RN Name: Patrice Rivera    Education  Patient Education: results and recommendations  Patient Education Response: Verbalizes understanding             Therapy Time  SLP Individual Minutes  Time In: 8051  Time Out: 1000  Minutes: 1 [place], [place]  6/25/2022 10:04 AM

## 2022-06-26 VITALS
WEIGHT: 140 LBS | DIASTOLIC BLOOD PRESSURE: 92 MMHG | RESPIRATION RATE: 18 BRPM | SYSTOLIC BLOOD PRESSURE: 169 MMHG | TEMPERATURE: 97.9 F | HEIGHT: 69 IN | OXYGEN SATURATION: 96 % | HEART RATE: 77 BPM | BODY MASS INDEX: 20.73 KG/M2

## 2022-06-26 PROBLEM — R91.8 LUNG MASS: Status: ACTIVE | Noted: 2022-06-26

## 2022-06-26 LAB
LV EF: 58 %
LVEF MODALITY: NORMAL

## 2022-06-26 PROCEDURE — 94761 N-INVAS EAR/PLS OXIMETRY MLT: CPT

## 2022-06-26 PROCEDURE — 93306 TTE W/DOPPLER COMPLETE: CPT

## 2022-06-26 PROCEDURE — 6370000000 HC RX 637 (ALT 250 FOR IP): Performed by: HOSPITALIST

## 2022-06-26 PROCEDURE — 2580000003 HC RX 258: Performed by: HOSPITALIST

## 2022-06-26 PROCEDURE — 6370000000 HC RX 637 (ALT 250 FOR IP): Performed by: INTERNAL MEDICINE

## 2022-06-26 PROCEDURE — 99233 SBSQ HOSP IP/OBS HIGH 50: CPT | Performed by: NURSE PRACTITIONER

## 2022-06-26 PROCEDURE — 99232 SBSQ HOSP IP/OBS MODERATE 35: CPT | Performed by: INTERNAL MEDICINE

## 2022-06-26 RX ORDER — AMLODIPINE BESYLATE 5 MG/1
5 TABLET ORAL DAILY
Qty: 30 TABLET | Refills: 3 | Status: SHIPPED | OUTPATIENT
Start: 2022-06-27 | End: 2022-06-26

## 2022-06-26 RX ORDER — CLOPIDOGREL BISULFATE 75 MG/1
75 TABLET ORAL DAILY
Qty: 21 TABLET | Refills: 0 | Status: SHIPPED | OUTPATIENT
Start: 2022-06-27 | End: 2022-07-18

## 2022-06-26 RX ORDER — AMLODIPINE BESYLATE 5 MG/1
10 TABLET ORAL DAILY
Qty: 60 TABLET | Refills: 0 | Status: SHIPPED | OUTPATIENT
Start: 2022-06-27 | End: 2022-07-27

## 2022-06-26 RX ORDER — ASPIRIN 81 MG/1
81 TABLET ORAL DAILY
Qty: 30 TABLET | Refills: 3 | Status: SHIPPED | OUTPATIENT
Start: 2022-06-27

## 2022-06-26 RX ORDER — ATORVASTATIN CALCIUM 40 MG/1
40 TABLET, FILM COATED ORAL NIGHTLY
Qty: 30 TABLET | Refills: 3 | Status: SHIPPED | OUTPATIENT
Start: 2022-06-26

## 2022-06-26 RX ORDER — AMLODIPINE BESYLATE 5 MG/1
5 TABLET ORAL DAILY
Status: DISCONTINUED | OUTPATIENT
Start: 2022-06-26 | End: 2022-06-26 | Stop reason: HOSPADM

## 2022-06-26 RX ORDER — ATORVASTATIN CALCIUM 40 MG/1
40 TABLET, FILM COATED ORAL NIGHTLY
Status: DISCONTINUED | OUTPATIENT
Start: 2022-06-26 | End: 2022-06-26 | Stop reason: HOSPADM

## 2022-06-26 RX ORDER — NICOTINE 21 MG/24HR
1 PATCH, TRANSDERMAL 24 HOURS TRANSDERMAL DAILY
Qty: 14 PATCH | Refills: 0 | Status: SHIPPED | OUTPATIENT
Start: 2022-06-26 | End: 2022-07-10

## 2022-06-26 RX ADMIN — AMLODIPINE BESYLATE 5 MG: 5 TABLET ORAL at 13:17

## 2022-06-26 RX ADMIN — CLOPIDOGREL BISULFATE 75 MG: 75 TABLET ORAL at 08:44

## 2022-06-26 RX ADMIN — ASPIRIN 81 MG: 81 TABLET, COATED ORAL at 08:44

## 2022-06-26 RX ADMIN — SODIUM CHLORIDE: 9 INJECTION, SOLUTION INTRAVENOUS at 10:45

## 2022-06-26 NOTE — PROGRESS NOTES
Pulmonary and Critical Care  Progress Note      VITALS:  BP (!) 159/87   Pulse 81   Temp 97.9 °F (36.6 °C) (Oral)   Resp 22   Ht 5' 9\" (1.753 m)   Wt 140 lb (63.5 kg)   SpO2 96%   BMI 20.67 kg/m²     Subjective:   CHIEF COMPLAINT :left arm and hand weakness     HPI:                The patient is sitting in the bed. He is not in acute resp distress    Objective:   PHYSICAL EXAM:    LUNGS:Occasional basal crackles  Abd-soft, BS+,NT  Ext- no pedal edema  CVS-s1s2, no murmurs      DATA:    CBC:  Recent Labs     06/23/22  1637 06/24/22  1536 06/25/22  0347   WBC 11.8* 10.0 9.0   RBC 4.83 5.06 4.65   HGB 15.3 16.0 14.3   HCT 46.3 50.1 44.5    271 235   MCV 95.9 99.0 95.7   MCH 31.7* 31.6* 30.8   MCHC 33.0 31.9* 32.1   RDW 12.7 12.9 12.9   SEGSPCT 72.3* 66.2*  --       BMP:  Recent Labs     06/23/22  1637 06/24/22  1536 06/25/22  0347    136 140   K 3.3* 3.7 3.6   CL 97* 101 105   CO2 26 24 26   BUN 10 9 13   CREATININE 1.0 1.0 0.9   CALCIUM 9.5 9.3 8.8   GLUCOSE 105* 79 89      ABG:  No results for input(s): PH, PO2ART, [id number], HCO3, BEART, O2SAT in the last 72 hours. BNP  No results found for: BNP   D-Dimer:  No results found for: DDIMER   1. Radiology: None      Assessment/Plan     Patient Active Problem List    Diagnosis Date Noted    Acute cerebrovascular accident (CVA) due to occlusion of right middle cerebral artery (HCC)      Priority: Medium    Acute ischemic stroke ([facility] [address] 06/24/2022     Priority: Medium   COPD  Acute Hypoxic resp failure- improving  RUL Mass  CHUY Spiculated nodule suspicious for lung ca  CVA with left arm weakness ? Sec to tiny subacute infarction of the right frontal lobe       1. ASA  2. Plavix  3. OP PET  4. Keep sats > 92%  5. Quit smoking  6. Inhalers  7. OP CT guided biopsy  8.  C/w present management    Electronically signed by Monica Pavon MD on 6/26/2022 at 11:39 AM

## 2022-06-26 NOTE — CARE COORDINATION
LSW notified by hospital doctor that patient needs a biopsy of the lung. Patient wants to discharge. LSW call to  director BRITTNY Keller. Patient biopsy needs to be completed as an outpatient. LSW call to IR scheduling and left a message. IR scheduling is not open on the weekend.

## 2022-06-26 NOTE — PROGRESS NOTES
numbness or tingling or weakness  Endocrine: Denies unexpected weight loss  Heme: Denies bleeding disorders    Physical Exam:       [unfilled]   Wt Readings from Last 3 Encounters:   06/26/22 140 lb (63.5 kg)   06/23/22 155 lb (70.3 kg)     Temp Readings from Last 3 Encounters:   06/26/22 97.9 °F (36.6 °C) (Oral)   06/23/22 98 °F (36.7 °C)     BP Readings from Last 3 Encounters:   06/26/22 (!) 159/87   06/23/22 (!) 155/73     Pulse Readings from Last 3 Encounters:   06/26/22 81   06/23/22 80        Gen: A&O x 4, NAD, cooperative  HEENT: NC/AT, EOMI, PERRL, mmm, neck supple, no meningeal signs; Heart: Regular  Lungs: No distress  Ext: no edema, no calf tenderness b/l  Psych: normal mood and affect  Skin: no rashes or lesions    NEUROLOGIC EXAM:    Mental Status: A&O to self, location, month and year, NAD, speech clear, language fluent, repetition and naming intact, follows commands appropriately    Cranial Nerve Exam:   CN II-XII:  PERRL, VFF, no nystagmus, no gaze paresis, sensation V1-V3 intact b/l, muscles of facial expression symmetric; hearing intact to conversational tone, palate elevates symmetrically, shoulder elevation symmetric and tongue protrudes midline with movement side to side.     Motor Exam:       Strength 5/5 UE's/LE's b/l  Tone and bulk normal   No pronator drift    Deep Tendon Reflexes: 2/4 biceps, brachioradialis, patellar, and achilles b/l; flexor plantar responses b/l    Sensation: Intact light touch/temperature UE's/LE's b/l    Coordination/Cerebellum:       Tremors--none      Rapidly alternating movements: Severe dysdiadochokinesia left upper extremity          Heel-to-Shin: no dysmetria b/l      Finger-to-Nose: no dysmetria b/l    Gait and stance:      Gait: deferred      LABS:     Recent Labs     06/23/22  1637 06/24/22  1536 06/25/22  0347   WBC 11.8* 10.0 9.0    136 140   K 3.3* 3.7 3.6   CL 97* 101 105   CO2 26 24 26   BUN 10 9 13   CREATININE 1.0 1.0 0.9   GLUCOSE 105* 79 89 INR 0.95  --   --          IMAGING:      CT Head:  A tiny acute to subacute infarction in the right frontal lobe in the right   MCA territory. CTA head and neck:  Tiny acute to subacute infarction in right frontal lobe.       75% stenosis in proximal P2 segment of the left PCA.       No acute abnormality or flow-limiting stenosis of the remainder of major   arteries of head and neck.       2.1 cm partially visualized lung mass in the right upper lobe, suspicious for   neoplasm. MRI brain w/wo:  No postcontrast images are available.  Therefore evaluation for metastatic   disease is not feasible.  Once they become available an addendum will be made.       8 mm focus of acute lacunar infarction within the right precentral gyrus. .       No intracranial hemorrhage or mass lesion.  Mild chronic microangiopathic   ischemic disease.       The findings were sent to the Radiology Results [address] at 3:47   pm on 6/25/2022 to be communicated to a licensed caregiver. Personally reviewed image agree with above impression      ASSESSMENT/PLAN:     3 52-year-old male with left arm and leg numbness and weakness secondary to suspected right MCA infarct superimposed on newly found lung mass, will rule out metastasis etiology as well. Plan of care as follows:  1. Neuro exam:  1. Left upper extremity pronator drift  2. Neurodiagnostics:  1. MRI brain with and without as above  2. CTA head neck as above, no acute intervention can be done with the posterior circulation artery, he is welcome to follow-up with neuro intervention on outpatient basis  3. Dismiss permissive HTN at this time   3. Medications:  1. Aspirin 81 mg, Plavix 75 mg x 21 days and nightly statin, after 21 days he can drop the Plavix and continue with the aspirin 81 mg  4. PT/OT/OT:  1. Per their recommendation  5. Follow-up:  1.  Able for discharge from our point of view        Thank you for allowing us to participate in the care of your patient. If there are any questions regarding evaluation please feel free to contact us.      Ky Escamilla, ALANA - SILVIANO, 6/26/2022

## 2022-06-27 LAB
EKG ATRIAL RATE: 102 BPM
EKG DIAGNOSIS: NORMAL
EKG P AXIS: 75 DEGREES
EKG P-R INTERVAL: 112 MS
EKG Q-T INTERVAL: 356 MS
EKG QRS DURATION: 80 MS
EKG QTC CALCULATION (BAZETT): 463 MS
EKG R AXIS: 74 DEGREES
EKG T AXIS: 71 DEGREES
EKG VENTRICULAR RATE: 102 BPM

## 2022-06-27 PROCEDURE — 93010 ELECTROCARDIOGRAM REPORT: CPT | Performed by: INTERNAL MEDICINE

## 2022-06-27 NOTE — DISCHARGE SUMMARY
V2.0  Discharge Summary    Name:  Clarissa Saha /Age/Sex: 1965 (65 y.o. male)   Admit Date: 2022  Discharge Date: 22    MRN & CSN:  [id number] & [id number] Encounter Date and Time 22 8:41 PM EDT    Attending:  No att. providers found Discharging Provider: Luis Lunsford MD       Hospital Course:     Brief HPI:   Clarissa Saha is a 65 y.o. male  who presents with Acute ischemic stroke [facility])        Plan:     CVA  Patient presented to the ER with left arm weakness but left AMA. CT of the head on  reveals tiny acute to subacute infarction right frontal lobe and the right MCA territory. CTA of the head and neck shows 75% stenosis in the proximal P2 segment of the left PCA. Admit under stroke protocol consult neurology. MRI ordered confirmed 8mm lacunar infarct in the right precentral gyrus. Waiting on further reporting to determine if there is anything to suggest metastases. Carotid doppler did not show any significant stenosis. Echo showed some mild left ventricular hypertrophy suggesting longstanding hypertension that is not controlled. Symptoms improved throughout the day though still not back to normal.  He continues to have some difficulty with left hand sensation and coordination.     --> Given his ABCD score, he was put on Plavix and aspirin. He will continue the Plavix for 3 weeks and then stop and remain on aspirin indefinitely. His lipid panel was good but he was nonetheless put on atorvastatin 40 mg daily. He was started on amlodipine for his blood pressure. Hypertension, essential, uncontrolled  The patient has not followed up with a physician in some time. He was not aware that he was hypertensive however the echocardiogram results would suggest that this is a longstanding problem. He was put on 5 mg of amlodipine today which did not have much effect. He insists on going home so he was discharged on 10 mg of amlodipine.   He was strongly advised to establish care with a primary care physician in order to monitor his blood pressure and titrate medications as needed     Lung mass  2.1 cm lung mass of the right upper lobe suspicious for neoplasm discovered incidentally on CTA of the head and neck. Requested ER to a dedicated CT of the chest for further affirmation. Consulting pulmonary and IR for tissue biopsy. Patient is an 80 pack year smoker. The plan was to obtain a biopsy while in the hospital however since the patient is adamant about leaving, arrangements are being made for him to have an outpatient biopsy. He will also get an outpatient PET scan and was told to follow-up with the pulmonologist who saw him in the hospital.        Tobacco abuse  80 pack years . .. still smokes  Spent 5 minutes in smoking cessation counseling especially in light of likely diagnosis of lung cancer. Lori applied. Patient does seem motivated to stop smoking.     The patient is insisting on going home as he is concerned about his dog. He understands the work-up is not complete. Because it is Sunday, it is impossible to make these arrangements today. Furthermore, the patient does not have a primary care physician who can help coordinate all of this and help make the necessary arrangements. Case management is going to help with arranging the interventional radiology biopsy. Orders were placed for both the biopsy and PET scan. The patient was told that if he does not hear from radiology for these 2 studies, to call them directly and try to arrange them. If he is unsuccessful, he was told to return to the hospital specifically to 3 E and asked to speak with the charge nurse in order to get the help that he needs to arrange these appointments. The charge nurse will be able to connect him with either the , the lead hospitalist, or radiology directly to ensure that the necessary follow-up is arranged.     The patient expressed appropriate understanding of, and agreement with the discharge recommendations, medications, and plan. Consults this admission:  IP CONSULT TO HOSPITALIST  IP CONSULT TO NEUROLOGY  IP CONSULT TO PULMONOLOGY  IP CONSULT TO INTERVENTIONAL RADIOLOGY    Discharge Diagnosis:   Acute ischemic stroke [facility])      Discharge Instruction:   Follow up appointments: Needs to establish with PCP, follow up with pulmonology in 1 week. Primary care physician: No primary care provider on file. within 2 weeks  Diet: regular diet   Activity: activity as tolerated  Disposition: Discharged to:   [x]Home, [][facility], []SNF, []Acute Rehab, []Hospice   Condition on discharge: Stable  Labs and Tests to be Followed up as an outpatient by PCP or Specialist: Biopsy results, PET CT results    Discharge Medications:        Medication List      START taking these medications    amLODIPine 5 MG tablet  Commonly known as: NORVASC  Take 2 tablets by mouth daily  Start taking on: June 27, 2022     aspirin 81 MG EC tablet  Take 1 tablet by mouth daily  Start taking on: June 27, 2022     atorvastatin 40 MG tablet  Commonly known as: LIPITOR  Take 1 tablet by mouth nightly     clopidogrel 75 MG tablet  Commonly known as: PLAVIX  Take 1 tablet by mouth daily for 21 days  Start taking on: June 27, 2022     * nicotine 14 MG/24HR  Commonly known as: NICODERM CQ  Place 1 patch onto the skin daily for 14 days     * nicotine 7 MG/24HR  Commonly known as: NICODERM CQ  Place 1 patch onto the skin daily for 14 days  Start taking on: July 11, 2022         * This list has 2 medication(s) that are the same as other medications prescribed for you. Read the directions carefully, and ask your doctor or other care provider to review them with you.                Where to Get Your Medications      These medications were sent to [address] [place] [address] [facility] Marisa Anaya [phone]  [address] [place] [address]    Phone: [phone]   · amLODIPine 5 MG tablet  · aspirin 81 MG EC tablet  · atorvastatin 40 MG tablet  · clopidogrel 75 MG tablet  · nicotine 14 MG/24HR  · nicotine 7 MG/24HR        Objective Findings at Discharge:   BP (!) 169/92   Pulse 77   Temp 97.9 °F (36.6 °C) (Oral)   Resp 18   Ht 5' 9\" (1.753 m)   Wt 140 lb (63.5 kg)   SpO2 96%   BMI 20.67 kg/m²       Physical Exam:   Physical Exam          Labs and Imaging   Echocardiogram complete 2D with doppler with color    Result Date: 6/26/2022  Transthoracic Echocardiography Report (TTE)  Demographics   Patient Name       Sera Getting       Date of Study       06/26/2022   Date of Birth      1965         Gender              Male   Age                65 year(s)         Race                   Patient Number     [id number]         Room Number         0623   Visit Number       [id number]   Corporate ID       [id number]   Accession Number   [id number]         Orelia Quails,                                                            CRT   Ordering Physician Junior Robbins    Interpreting        Inge Couch MD           Physician           Michelle Monteiro MD  Procedure Type of Study   TTE procedure:ECHOCARDIOGRAM COMPLETE 2D W DOPPLER W COLOR. Procedure Date Date: 06/26/2022 Start: 07:41 AM Study Location: [facility] Technical Quality: Fair visualization Indications:CVA. Patient Status: Routine Height: 69 inches Weight: 155 pounds BSA: 1.85 m2 BMI: 22.89 kg/m2 Rhythm: Sinus rhythm HR: 67 bpm BP: 156/88 mmHg  Conclusions   Summary  Left ventricular function and size is normal, EF is estimated at 55-60%. Mild left ventricular hypertrophy. No evidence of diastolic dysfunction. No regional wall motion abnormalities were detected. Mild mitral and tricuspid regurgitation is present. RVSP is 35 mmHg. No evidence of pericardial effusion.    Signature   ------------------------------------------------------------------  Electronically signed by Jony Thomas MD  (Interpreting physician) on 06/26/2022 at 12:58 PM  ------------------------------------------------------------------   Findings   Left Ventricle  Left ventricular function and size is normal, EF is estimated at 55-60%. Mild left ventricular hypertrophy. No evidence of diastolic dysfunction. No regional wall motion abnormalities were detected. Left Atrium  Normal left atrium size and structure. Right Atrium  Normal right atrium size and structure. Right Ventricle  Essentially normal right ventricle. Aortic Valve  Normal aortic valve structure and function. Mitral Valve  Normal mitral valve structure and function. Mild mitral regurgitation is present. Tricuspid Valve  Normal tricuspid valve structure and function. Mild tricuspid regurgitation; RVSP is 35 mmHg. Pulmonic Valve  The pulmonic valve was not well visualized. Pericardial Effusion  No evidence of pericardial effusion. Pleural Effusion  No evidence of pleural effusion. Miscellaneous  No abnormalities were noted in the IVC, abdominal aorta, or aortic root.   M-Mode/2D Measurements & Calculations   LV Diastolic Dimension:  LV Systolic Dimension:  LA Dimension: 3.1 cmAO Root  3.99 cm                  2.98 cm                 Dimension: 3.1 cmLA Area:  LV FS:25.3 %             LV Volume Diastolic: 50 11.5 cm2  LV PW Diastolic: 1.89 cm ml  LV PW Systolic: 2.93 cm  LV Volume Systolic: 21  Septum Diastolic: 7.35   ml  cm                       LV EDV/LV EDV Index: 50 RV Diastolic Dimension:  Septum Systolic: 9.65 cm RP/26 M4UD ESV/LV ESV   2.53 cm  CO: 3.83 l/min           Index: 21 ml/11 m2  CI: 2.07 l/m*m2          EF Calculated (A4C): 58 LA/Aorta: 1                           %  LV Area Diastolic: 36.5  EF Calculated (2D):     LA volume/Index: 25 ml  cm2                      50.6 %                  /24I5  LV Area Systolic: 27.4  cm2                      LV Length: 6.01 cm LVOT: 2 cm  Doppler Measurements & Calculations   MV Peak E-Wave: 68.1  AV Peak Velocity: 98.1 cm/s LVOT Peak Velocity: 84.2  cm/s                  AV Peak Gradient: 3.85 mmHg cm/s  MV Peak A-Wave: 47.9  AV Mean Velocity: 66.2 cm/s LVOT Mean Velocity: 46.1  cm/s                  AV Mean Gradient: 2 mmHg    cm/s  MV E/A Ratio: 1.42    AV VTI: 21.2 cm             LVOT Peak Gradient: 3  MV Peak Gradient:     AV Area (Continuity):2.7    mmHgLVOT Mean Gradient: 1  1.86 mmHg             cm2                         mmHg                                                    Estimated RVSP: 35 mmHg  MV P1/2t: 65 msec     LVOT VTI: 18.2 cm           Estimated RAP:3 mmHg  MVA by PHT:3.38 cm2                        Estimated PASP: 30.67 mmHg                                                    TR Velocity:263 cm/s  MV E' Lateral                                     TR Gradient:27.67 mmHg  Velocity: 13.3 cm/s  MV A' Lateral  Velocity: 8.11 cm/s  MV E/E' lateral: 5.12      CT HEAD WO CONTRAST    Addendum Date: 6/23/2022    ADDENDUM: Results were reported to Dr. Landen Caicedo at 5:04 p.m. on June 23, 2022. Result Date: 6/23/2022  EXAMINATION: CT OF THE HEAD WITHOUT CONTRAST  6/23/2022 4:52 pm TECHNIQUE: CT of the head was performed without the administration of intravenous contrast. Automated exposure control, iterative reconstruction, and/or weight based adjustment of the mA/kV was utilized to reduce the radiation dose to as low as reasonably achievable. COMPARISON: CT head January 6, 2013 HISTORY: ORDERING SYSTEM PROVIDED HISTORY: Left arm weankess TECHNOLOGIST PROVIDED HISTORY: Has a \"code stroke\" or \"stroke alert\" been called? ->Yes Reason for exam:->Left arm weankess Decision Support Exception - unselect if not a suspected or confirmed emergency medical condition->Emergency Medical Condition (MA) Reason for Exam: Left arm weankess FINDINGS: BRAIN/VENTRICLES: There is a tiny acute to subacute infarction in the right frontal lobe in the right MCA territory (series 2, image 46). There is no acute intracranial hemorrhage, mass effect or midline shift. No abnormal extra-axial fluid collection. The gray-white differentiation is maintained without evidence of an acute infarct. There is no evidence of hydrocephalus. ORBITS: There are postoperative changes along the lateral wall of left orbit. The visualized portion of the orbits demonstrate no acute abnormality. SINUSES: The visualized paranasal sinuses and mastoid air cells demonstrate no acute abnormality. SOFT TISSUES/SKULL:  No acute abnormality of the visualized skull or soft tissues. A tiny acute to subacute infarction in the right frontal lobe in the right MCA territory. CT CHEST W CONTRAST    Result Date: 6/24/2022  EXAMINATION: CT OF THE CHEST WITH CONTRAST 6/24/2022 9:04 pm TECHNIQUE: CT of the chest was performed with the administration of intravenous contrast. Multiplanar reformatted images are provided for review. Automated exposure control, iterative reconstruction, and/or weight based adjustment of the mA/kV was utilized to reduce the radiation dose to as low as reasonably achievable. COMPARISON: Current chest x-ray. HISTORY: ORDERING SYSTEM PROVIDED HISTORY: lung mass TECHNOLOGIST PROVIDED HISTORY: Reason for exam:->lung mass Decision Support Exception - unselect if not a suspected or confirmed emergency medical condition->Emergency Medical Condition (MA) Reason for Exam: lung mass FINDINGS: Mediastinum: Cardiac chambers are not enlarged. Thoracic aorta is not aneurysmal.  The mediastinal lymph nodes are not enlarged. There is some mildly increased lymphoid tissue in the right hilum measuring up to a 1.4 x 1 cm. Few small calcified lymph nodes are present in the jose as well. Lungs/pleura: Pulmonary emphysema is present with blebs, bulla, and has pleuroparenchymal scarring in the lung apices.   In the right upper lobe is a 2.0 x 1.5 x 1.4 cm soft tissue mass with a lobular margin and adjacent scarring. In the left upper lobe is a 1.3 x 0.9 x 1.0 cm mass the slightly spiculated margin. Other areas of calcified granulomas are present within both lungs. There is no consolidation to suggest pneumonia. No pleural effusion or pneumothorax on either side. Upper Abdomen: Only a small portion of the upper abdomen is included. No free air free fluid at the upper abdomen. No focal masses are seen at the included portion of the adrenal glands. Soft Tissues/Bones: Schmorl's nodes along the thoracic endplates but there is no diffuse lytic or blastic lesions in the bones. 1.  There is a mass in the right upper lobe and spiculated nodule in the left upper lobe suspicious for primary lung cancer. Recommend further evaluation with PET CT and biopsy if not previously worked up. 2.  Mildly increased lymphoid tissue at the right hilum but patient also has pulmonary emphysema and old granulomatous disease. XR CHEST PORTABLE    Result Date: 6/23/2022  EXAMINATION: ONE XRAY VIEW OF THE CHEST 6/23/2022 4:49 pm COMPARISON: None. HISTORY: ORDERING SYSTEM PROVIDED HISTORY: stroke symptoms TECHNOLOGIST PROVIDED HISTORY: Reason for exam:->stroke symptoms Reason for Exam: stroke symptoms FINDINGS: There is scarring within both lung apices as well as a 3.9 cm in diameter masslike airspace opacity within the right upper lobe. No infiltrate or effusion is identified. The heart size is normal.     Scarring within both lung apices as well as a masslike airspace opacity within the right upper lobe. Recommend CT for further evaluation. VL DUP CAROTID BILATERAL    Result Date: 6/25/2022  EXAMINATION: ULTRASOUND EVALUATION OF THE CAROTID ARTERIES 6/25/2022 TECHNIQUE: Duplex ultrasound using B-mode/gray scaled imaging, Doppler spectral analysis and color flow Doppler was obtained of the carotid arteries. COMPARISON: None.  HISTORY: ORDERING SYSTEM PROVIDED HISTORY: Acute CVA TECHNOLOGIST PROVIDED HISTORY: Reason for exam:->Acute CVA Reason for Exam: CVA FINDINGS: RIGHT: The right common carotid artery demonstrates peak systolic velocities of 924 and 80 cm/sec in the proximal and distal segments respectively. The right internal carotid artery demonstrates the systolic velocities of 74, 70, and 96 cm/sec in the proximal, mid and distal segments respectively. The external carotid artery is patent. The vertebral artery demonstrates normal antegrade flow. Atherosclerotic plaque is visualized in the proximal, mid, and distal common carotid artery and proximal internal carotid artery. ICA/CCA ratio of 0.96 LEFT: The left common carotid artery demonstrates peak systolic velocities of 85 and 83 cm/sec in the proximal and distal segments respectively. The left internal carotid artery demonstrates the systolic velocities of 91, 71, and 94 cm/sec in the proximal, mid and distal segments respectively. The external carotid artery is patent. The vertebral artery demonstrates normal antegrade flow. Atherosclerotic plaque is visualized at the carotid bulb and in the proximal internal carotid artery. ICA/CCA ratio of 1.11     The right internal carotid artery demonstrates 0-50% stenosis. The left internal carotid artery demonstrates 0-50% stenosis. Bilateral vertebral arteries are patent with flow in the normal direction. CTA HEAD NECK W CONTRAST    Result Date: 6/23/2022  EXAMINATION: CTA OF THE HEAD AND NECK WITH CONTRAST 6/23/2022 5:00 pm: TECHNIQUE: CTA of the head and neck was performed with the administration of intravenous contrast. Multiplanar reformatted images are provided for review. MIP images are provided for review. Stenosis of the internal carotid arteries measured using NASCET criteria. Automated exposure control, iterative reconstruction, and/or weight based adjustment of the mA/kV was utilized to reduce the radiation dose to as low as reasonably achievable.  COMPARISON: Noncontrast CT head from earlier today HISTORY: ORDERING SYSTEM PROVIDED HISTORY: Left arm weakness TECHNOLOGIST PROVIDED HISTORY: Has a \"code stroke\" or \"stroke alert\" been called? ->Yes Reason for exam:->Left arm weakness Decision Support Exception - unselect if not a suspected or confirmed emergency medical condition->Emergency Medical Condition (MA) Reason for Exam: Left arm weakness FINDINGS: CTA NECK: AORTIC ARCH/ARCH VESSELS: No dissection or arterial injury. No significant stenosis of the brachiocephalic or subclavian arteries. CAROTID ARTERIES: No dissection, arterial injury, or hemodynamically significant stenosis by NASCET criteria. VERTEBRAL ARTERIES: No dissection, arterial injury, or significant stenosis. SOFT TISSUES: There is a 2.1 cm partially visualized lung mass in the right upper lobe, suspicious for neoplasm. There is severe emphysema. There is bronchial wall thickening, may be related to small airway disease or bronchiolitis. No cervical or superior mediastinal lymphadenopathy. The larynx and pharynx are unremarkable. No acute abnormality of the salivary and thyroid glands. BONES: No acute osseous abnormality. There are moderate degenerative changes in the cervical spine. CTA HEAD: ANTERIOR CIRCULATION: No significant stenosis of the intracranial internal carotid, anterior cerebral, or middle cerebral arteries. No aneurysm. POSTERIOR CIRCULATION: There is 75% stenosis in proximal P2 segment of the left PCA. No significant stenosis of the vertebral, basilar, or posterior cerebral arteries. No aneurysm. OTHER: No dural venous sinus thrombosis on this non-dedicated study. BRAIN: There is a tiny acute to subacute infarction in right frontal lobe. No mass effect or midline shift. No extra-axial fluid collection. Tiny acute to subacute infarction in right frontal lobe. 75% stenosis in proximal P2 segment of the left PCA. No acute abnormality or flow-limiting stenosis of the remainder of major arteries of head and neck. 2.1 cm partially visualized lung mass in the right upper lobe, suspicious for neoplasm. MRI BRAIN W WO CONTRAST    Addendum Date: 6/25/2022    ADDENDUM: No focus of abnormal enhancement is detected. No evidence of intracranial metastatic disease. Result Date: 6/25/2022  EXAMINATION: MRI OF THE BRAIN WITHOUT AND WITH CONTRAST  6/25/2022 12:51 pm TECHNIQUE: Multiplanar multisequence MRI of the head/brain was performed without and with the administration of intravenous contrast. COMPARISON: None. HISTORY: ORDERING SYSTEM PROVIDED HISTORY: Stroke versus metastasis TECHNOLOGIST PROVIDED HISTORY: Reason for exam:->Stroke versus metastasis What is the sedation requirement?->None Reason for Exam: stroke vs mets FINDINGS: INTRACRANIAL STRUCTURES/VENTRICLES: 8 mm focus of restricted diffusion within the right precentral gyrus is likely suggestive of acute infarction. No mass effect or midline shift. No evidence of an acute intracranial hemorrhage. The ventricles and sulci are normal in size and configuration. The sellar/suprasellar regions appear unremarkable. The normal signal voids within the major intracranial vessels appear maintained. No postcontrast images are available. There are mild nonspecific foci of periventricular and subcortical cerebral white matter T2/FLAIR hyperintensity, most likely representing chronic microangiopathic disease in this age group. ORBITS: The visualized portion of the orbits demonstrate no acute abnormality. SINUSES: The visualized paranasal sinuses and mastoid air cells demonstrate no acute abnormality. BONES/SOFT TISSUES: The bone marrow signal intensity appears normal. The soft tissues demonstrate no acute abnormality. No postcontrast images are available. Therefore evaluation for metastatic disease is not feasible. Once they become available an addendum will be made. 8 mm focus of acute lacunar infarction within the right precentral gyrus. . No intracranial hemorrhage or mass lesion. Mild chronic microangiopathic ischemic disease. The findings were sent to the Radiology Results [id number] at 3:47 pm on 6/25/2022 to be communicated to a licensed caregiver. CBC:   Recent Labs     06/24/22  1536 06/25/22  0347   WBC 10.0 9.0   HGB 16.0 14.3    235     BMP:    Recent Labs     06/24/22  1536 06/25/22  0347    140   K 3.7 3.6    105   CO2 24 26   BUN 9 13   CREATININE 1.0 0.9   GLUCOSE 79 89     Hepatic:   Recent Labs     06/24/22  1536 06/25/22  0347   AST 29 21   ALT 21 18   BILITOT 0.4 0.4   ALKPHOS 60 65     Lipids:   Lab Results   Component Value Date    CHOL 122 06/25/2022    HDL 34 06/25/2022    TRIG 143 06/25/2022     Hemoglobin A1C:   Lab Results   Component Value Date    LABA1C 5.9 06/25/2022     TSH: No results found for: TSH  Troponin:   Lab Results   Component Value Date    TROPONINT <0.010 06/23/2022     Lactic Acid: No results for input(s): LACTA in the last 72 hours. BNP: No results for input(s): PROBNP in the last 72 hours.   UA:No results found for: Brendon Ego, PHUR, LABCAST, WBCUA, RBCUA, MUCUS, TRICHOMONAS, YEAST, BACTERIA, CLARITYU, SPECGRAV, LEUKOCYTESUR, UROBILINOGEN, BILIRUBINUR, BLOODU, GLUCOSEU, KETUA, AMORPHOUS  Urine Cultures: No results found for: LABURIN  Blood Cultures: No results found for: BC  No results found for: BLOODCULT2  Organism: No results found for: ORG    Time Spent Discharging patient 80 minutes    Electronically signed by Stanislav Dominguez MD on 6/26/2022 at 8:41 PM

## 2023-01-01 ENCOUNTER — APPOINTMENT (OUTPATIENT)
Dept: GENERAL RADIOLOGY | Age: 58
DRG: 045 | End: 2023-01-01
Payer: COMMERCIAL

## 2023-01-01 ENCOUNTER — APPOINTMENT (OUTPATIENT)
Dept: CT IMAGING | Age: 58
DRG: 045 | End: 2023-01-01
Payer: COMMERCIAL

## 2023-01-01 ENCOUNTER — HOSPITAL ENCOUNTER (INPATIENT)
Age: 58
LOS: 2 days | DRG: 862 | End: 2023-08-13
Attending: FAMILY MEDICINE | Admitting: FAMILY MEDICINE
Payer: COMMERCIAL

## 2023-01-01 ENCOUNTER — HOSPITAL ENCOUNTER (INPATIENT)
Age: 58
LOS: 12 days | Discharge: HOSPICE | DRG: 045 | End: 2023-08-11
Attending: STUDENT IN AN ORGANIZED HEALTH CARE EDUCATION/TRAINING PROGRAM | Admitting: STUDENT IN AN ORGANIZED HEALTH CARE EDUCATION/TRAINING PROGRAM
Payer: COMMERCIAL

## 2023-01-01 ENCOUNTER — APPOINTMENT (OUTPATIENT)
Dept: ULTRASOUND IMAGING | Age: 58
DRG: 045 | End: 2023-01-01
Payer: COMMERCIAL

## 2023-01-01 ENCOUNTER — APPOINTMENT (OUTPATIENT)
Dept: MRI IMAGING | Age: 58
DRG: 045 | End: 2023-01-01
Payer: COMMERCIAL

## 2023-01-01 VITALS
DIASTOLIC BLOOD PRESSURE: 54 MMHG | RESPIRATION RATE: 30 BRPM | HEART RATE: 120 BPM | SYSTOLIC BLOOD PRESSURE: 90 MMHG | OXYGEN SATURATION: 68 % | TEMPERATURE: 97.2 F

## 2023-01-01 VITALS
HEART RATE: 122 BPM | OXYGEN SATURATION: 100 % | DIASTOLIC BLOOD PRESSURE: 71 MMHG | BODY MASS INDEX: 15.8 KG/M2 | WEIGHT: 106.7 LBS | TEMPERATURE: 99.2 F | RESPIRATION RATE: 30 BRPM | HEIGHT: 69 IN | SYSTOLIC BLOOD PRESSURE: 112 MMHG

## 2023-01-01 DIAGNOSIS — R91.8 LUNG MASS: ICD-10-CM

## 2023-01-01 DIAGNOSIS — J96.21 ACUTE ON CHRONIC RESPIRATORY FAILURE WITH HYPOXIA (HCC): ICD-10-CM

## 2023-01-01 DIAGNOSIS — I48.91 NEW ONSET A-FIB (HCC): ICD-10-CM

## 2023-01-01 DIAGNOSIS — C34.11 MALIGNANT NEOPLASM OF UPPER LOBE OF RIGHT LUNG (HCC): Primary | ICD-10-CM

## 2023-01-01 DIAGNOSIS — R09.02 HYPOXIA: ICD-10-CM

## 2023-01-01 DIAGNOSIS — J18.9 PNEUMONIA OF RIGHT LUNG DUE TO INFECTIOUS ORGANISM, UNSPECIFIED PART OF LUNG: Primary | ICD-10-CM

## 2023-01-01 LAB
ABO/RH: NORMAL
ALBUMIN SERPL-MCNC: 2.1 GM/DL (ref 3.4–5)
ALBUMIN SERPL-MCNC: 2.2 GM/DL (ref 3.4–5)
ALBUMIN SERPL-MCNC: 2.2 GM/DL (ref 3.4–5)
ALBUMIN SERPL-MCNC: 2.4 GM/DL (ref 3.4–5)
ALBUMIN SERPL-MCNC: 2.6 GM/DL (ref 3.4–5)
ALBUMIN SERPL-MCNC: 3.5 GM/DL (ref 3.4–5)
ALP BLD-CCNC: 118 IU/L (ref 40–128)
ALP BLD-CCNC: 118 IU/L (ref 40–129)
ALP BLD-CCNC: 90 IU/L (ref 40–128)
ALT SERPL-CCNC: 10 U/L (ref 10–40)
ALT SERPL-CCNC: 14 U/L (ref 10–40)
ALT SERPL-CCNC: 21 U/L (ref 10–40)
AMORPHOUS: ABNORMAL /HPF
ANION GAP SERPL CALCULATED.3IONS-SCNC: 10 MMOL/L (ref 4–16)
ANION GAP SERPL CALCULATED.3IONS-SCNC: 11 MMOL/L (ref 4–16)
ANION GAP SERPL CALCULATED.3IONS-SCNC: 11 MMOL/L (ref 4–16)
ANION GAP SERPL CALCULATED.3IONS-SCNC: 14 MMOL/L (ref 4–16)
ANION GAP SERPL CALCULATED.3IONS-SCNC: 15 MMOL/L (ref 4–16)
ANION GAP SERPL CALCULATED.3IONS-SCNC: 15 MMOL/L (ref 4–16)
ANION GAP SERPL CALCULATED.3IONS-SCNC: 8 MMOL/L (ref 4–16)
ANION GAP SERPL CALCULATED.3IONS-SCNC: 9 MMOL/L (ref 4–16)
ANTIBODY SCREEN: NEGATIVE
AST SERPL-CCNC: 12 IU/L (ref 15–37)
AST SERPL-CCNC: 14 IU/L (ref 15–37)
AST SERPL-CCNC: 26 IU/L (ref 15–37)
BACTERIA: ABNORMAL /HPF
BANDED NEUTROPHILS ABSOLUTE COUNT: 0.56 K/CU MM
BANDED NEUTROPHILS ABSOLUTE COUNT: 3.59 K/CU MM
BANDED NEUTROPHILS RELATIVE PERCENT: 11 % (ref 5–11)
BANDED NEUTROPHILS RELATIVE PERCENT: 2 % (ref 5–11)
BASE EXCESS MIXED: 4.5 (ref 0–1.2)
BASE EXCESS MIXED: 7.8 (ref 0–1.2)
BASE EXCESS: 1 (ref 0–3.3)
BASE EXCESS: 3 (ref 0–3.3)
BASE EXCESS: 4 (ref 0–3.3)
BASE EXCESS: ABNORMAL (ref 0–3.3)
BASOPHILS ABSOLUTE: 0 K/CU MM
BASOPHILS ABSOLUTE: 0.1 K/CU MM
BASOPHILS RELATIVE PERCENT: 0.1 % (ref 0–1)
BASOPHILS RELATIVE PERCENT: 0.2 % (ref 0–1)
BASOPHILS RELATIVE PERCENT: 0.3 % (ref 0–1)
BASOPHILS RELATIVE PERCENT: 0.4 % (ref 0–1)
BILIRUB SERPL-MCNC: 0.4 MG/DL (ref 0–1)
BILIRUB SERPL-MCNC: 0.7 MG/DL (ref 0–1)
BILIRUB SERPL-MCNC: 0.7 MG/DL (ref 0–1)
BILIRUBIN URINE: NEGATIVE MG/DL
BLOOD, URINE: ABNORMAL
BUN SERPL-MCNC: 10 MG/DL (ref 6–23)
BUN SERPL-MCNC: 11 MG/DL (ref 6–23)
BUN SERPL-MCNC: 12 MG/DL (ref 6–23)
BUN SERPL-MCNC: 13 MG/DL (ref 6–23)
BUN SERPL-MCNC: 15 MG/DL (ref 6–23)
BUN SERPL-MCNC: 4 MG/DL (ref 6–23)
BUN SERPL-MCNC: 4 MG/DL (ref 6–23)
BUN SERPL-MCNC: 5 MG/DL (ref 6–23)
BUN SERPL-MCNC: 6 MG/DL (ref 6–23)
BUN SERPL-MCNC: 6 MG/DL (ref 6–23)
BUN SERPL-MCNC: 7 MG/DL (ref 6–23)
BUN SERPL-MCNC: 8 MG/DL (ref 6–23)
BUN SERPL-MCNC: 9 MG/DL (ref 6–23)
BUN SERPL-MCNC: 9 MG/DL (ref 6–23)
CALCIUM SERPL-MCNC: 10.4 MG/DL (ref 8.3–10.6)
CALCIUM SERPL-MCNC: 7.3 MG/DL (ref 8.3–10.6)
CALCIUM SERPL-MCNC: 7.5 MG/DL (ref 8.3–10.6)
CALCIUM SERPL-MCNC: 7.7 MG/DL (ref 8.3–10.6)
CALCIUM SERPL-MCNC: 7.9 MG/DL (ref 8.3–10.6)
CALCIUM SERPL-MCNC: 8 MG/DL (ref 8.3–10.6)
CALCIUM SERPL-MCNC: 8.2 MG/DL (ref 8.3–10.6)
CALCIUM SERPL-MCNC: 8.3 MG/DL (ref 8.3–10.6)
CALCIUM SERPL-MCNC: 8.4 MG/DL (ref 8.3–10.6)
CALCIUM SERPL-MCNC: 8.4 MG/DL (ref 8.3–10.6)
CALCIUM SERPL-MCNC: 8.5 MG/DL (ref 8.3–10.6)
CALCIUM SERPL-MCNC: 8.6 MG/DL (ref 8.3–10.6)
CALCIUM SERPL-MCNC: 8.7 MG/DL (ref 8.3–10.6)
CALCIUM SERPL-MCNC: 9.3 MG/DL (ref 8.3–10.6)
CALCIUM SERPL-MCNC: 9.3 MG/DL (ref 8.3–10.6)
CARBON MONOXIDE, BLOOD: 2.1 % (ref 0–5)
CARBON MONOXIDE, BLOOD: 2.1 % (ref 0–5)
CARBON MONOXIDE, BLOOD: 2.4 % (ref 0–5)
CHLORIDE BLD-SCNC: 100 MMOL/L (ref 99–110)
CHLORIDE BLD-SCNC: 102 MMOL/L (ref 99–110)
CHLORIDE BLD-SCNC: 102 MMOL/L (ref 99–110)
CHLORIDE BLD-SCNC: 103 MMOL/L (ref 99–110)
CHLORIDE BLD-SCNC: 105 MMOL/L (ref 99–110)
CHLORIDE BLD-SCNC: 106 MMOL/L (ref 99–110)
CHLORIDE BLD-SCNC: 107 MMOL/L (ref 99–110)
CHLORIDE BLD-SCNC: 110 MMOL/L (ref 99–110)
CHLORIDE BLD-SCNC: 110 MMOL/L (ref 99–110)
CHLORIDE BLD-SCNC: 111 MMOL/L (ref 99–110)
CHLORIDE BLD-SCNC: 112 MMOL/L (ref 99–110)
CHLORIDE BLD-SCNC: 113 MMOL/L (ref 99–110)
CHOLEST SERPL-MCNC: 153 MG/DL
CLARITY: CLEAR
CO2 CONTENT: 21.8 MMOL/L (ref 19–24)
CO2 CONTENT: 22 MMOL/L (ref 19–24)
CO2 CONTENT: 23.2 MMOL/L (ref 19–24)
CO2 CONTENT: 24 MMOL/L (ref 19–24)
CO2: 16 MMOL/L (ref 21–32)
CO2: 17 MMOL/L (ref 21–32)
CO2: 18 MMOL/L (ref 21–32)
CO2: 19 MMOL/L (ref 21–32)
CO2: 20 MMOL/L (ref 21–32)
CO2: 21 MMOL/L (ref 21–32)
CO2: 22 MMOL/L (ref 21–32)
CO2: 25 MMOL/L (ref 21–32)
COLOR: YELLOW
COMMENT: ABNORMAL
COMPONENT: NORMAL
CREAT SERPL-MCNC: 0.5 MG/DL (ref 0.9–1.3)
CREAT SERPL-MCNC: 0.5 MG/DL (ref 0.9–1.3)
CREAT SERPL-MCNC: 0.6 MG/DL (ref 0.9–1.3)
CREAT SERPL-MCNC: 0.7 MG/DL (ref 0.9–1.3)
CREAT SERPL-MCNC: 0.8 MG/DL (ref 0.9–1.3)
CREAT SERPL-MCNC: 0.9 MG/DL (ref 0.9–1.3)
CROSSMATCH RESULT: NORMAL
CULTURE: NORMAL
D DIMER: 1.25 UG/ML (FEU)
DIFFERENTIAL TYPE: ABNORMAL
EKG ATRIAL RATE: 105 BPM
EKG ATRIAL RATE: 136 BPM
EKG ATRIAL RATE: 136 BPM
EKG ATRIAL RATE: 139 BPM
EKG ATRIAL RATE: 160 BPM
EKG DIAGNOSIS: NORMAL
EKG P AXIS: 69 DEGREES
EKG P AXIS: 71 DEGREES
EKG P AXIS: 74 DEGREES
EKG P AXIS: 80 DEGREES
EKG P AXIS: 81 DEGREES
EKG P-R INTERVAL: 114 MS
EKG P-R INTERVAL: 120 MS
EKG P-R INTERVAL: 120 MS
EKG P-R INTERVAL: 122 MS
EKG P-R INTERVAL: 128 MS
EKG Q-T INTERVAL: 280 MS
EKG Q-T INTERVAL: 296 MS
EKG Q-T INTERVAL: 312 MS
EKG Q-T INTERVAL: 338 MS
EKG Q-T INTERVAL: 370 MS
EKG QRS DURATION: 72 MS
EKG QRS DURATION: 74 MS
EKG QRS DURATION: 74 MS
EKG QRS DURATION: 86 MS
EKG QRS DURATION: 88 MS
EKG QTC CALCULATION (BAZETT): 421 MS
EKG QTC CALCULATION (BAZETT): 445 MS
EKG QTC CALCULATION (BAZETT): 446 MS
EKG QTC CALCULATION (BAZETT): 509 MS
EKG QTC CALCULATION (BAZETT): 563 MS
EKG R AXIS: 65 DEGREES
EKG R AXIS: 75 DEGREES
EKG R AXIS: 82 DEGREES
EKG R AXIS: 83 DEGREES
EKG R AXIS: 86 DEGREES
EKG T AXIS: 63 DEGREES
EKG T AXIS: 65 DEGREES
EKG T AXIS: 70 DEGREES
EKG T AXIS: 78 DEGREES
EKG T AXIS: 80 DEGREES
EKG VENTRICULAR RATE: 105 BPM
EKG VENTRICULAR RATE: 136 BPM
EKG VENTRICULAR RATE: 136 BPM
EKG VENTRICULAR RATE: 139 BPM
EKG VENTRICULAR RATE: 160 BPM
EOSINOPHILS ABSOLUTE: 0 K/CU MM
EOSINOPHILS ABSOLUTE: 0.1 K/CU MM
EOSINOPHILS ABSOLUTE: 0.3 K/CU MM
EOSINOPHILS ABSOLUTE: 0.3 K/CU MM
EOSINOPHILS RELATIVE PERCENT: 0 % (ref 0–3)
EOSINOPHILS RELATIVE PERCENT: 0.1 % (ref 0–3)
EOSINOPHILS RELATIVE PERCENT: 0.1 % (ref 0–3)
EOSINOPHILS RELATIVE PERCENT: 0.4 % (ref 0–3)
EOSINOPHILS RELATIVE PERCENT: 0.6 % (ref 0–3)
EOSINOPHILS RELATIVE PERCENT: 0.6 % (ref 0–3)
EOSINOPHILS RELATIVE PERCENT: 0.7 % (ref 0–3)
EOSINOPHILS RELATIVE PERCENT: 0.8 % (ref 0–3)
EOSINOPHILS RELATIVE PERCENT: 1 % (ref 0–3)
EOSINOPHILS RELATIVE PERCENT: 1 % (ref 0–3)
ESTIMATED AVERAGE GLUCOSE: 108 MG/DL
GFR SERPL CREATININE-BSD FRML MDRD: >60 ML/MIN/1.73M2
GLUCOSE BLD-MCNC: 100 MG/DL (ref 70–99)
GLUCOSE BLD-MCNC: 100 MG/DL (ref 70–99)
GLUCOSE BLD-MCNC: 102 MG/DL (ref 70–99)
GLUCOSE BLD-MCNC: 103 MG/DL (ref 70–99)
GLUCOSE BLD-MCNC: 104 MG/DL (ref 70–99)
GLUCOSE BLD-MCNC: 105 MG/DL (ref 70–99)
GLUCOSE BLD-MCNC: 105 MG/DL (ref 70–99)
GLUCOSE BLD-MCNC: 108 MG/DL (ref 70–99)
GLUCOSE BLD-MCNC: 108 MG/DL (ref 70–99)
GLUCOSE BLD-MCNC: 109 MG/DL (ref 70–99)
GLUCOSE BLD-MCNC: 113 MG/DL (ref 70–99)
GLUCOSE BLD-MCNC: 114 MG/DL (ref 70–99)
GLUCOSE BLD-MCNC: 115 MG/DL (ref 70–99)
GLUCOSE BLD-MCNC: 115 MG/DL (ref 70–99)
GLUCOSE BLD-MCNC: 118 MG/DL (ref 70–99)
GLUCOSE BLD-MCNC: 119 MG/DL (ref 70–99)
GLUCOSE BLD-MCNC: 120 MG/DL (ref 70–99)
GLUCOSE BLD-MCNC: 125 MG/DL (ref 70–99)
GLUCOSE BLD-MCNC: 128 MG/DL (ref 70–99)
GLUCOSE BLD-MCNC: 134 MG/DL (ref 70–99)
GLUCOSE BLD-MCNC: 135 MG/DL (ref 70–99)
GLUCOSE BLD-MCNC: 144 MG/DL (ref 70–99)
GLUCOSE BLD-MCNC: 145 MG/DL (ref 70–99)
GLUCOSE BLD-MCNC: 147 MG/DL (ref 70–99)
GLUCOSE BLD-MCNC: 149 MG/DL (ref 70–99)
GLUCOSE BLD-MCNC: 149 MG/DL (ref 70–99)
GLUCOSE BLD-MCNC: 15 MG/DL (ref 70–99)
GLUCOSE BLD-MCNC: 152 MG/DL (ref 70–99)
GLUCOSE BLD-MCNC: 153 MG/DL (ref 70–99)
GLUCOSE BLD-MCNC: 16 MG/DL (ref 70–99)
GLUCOSE BLD-MCNC: 184 MG/DL (ref 70–99)
GLUCOSE BLD-MCNC: 193 MG/DL (ref 70–99)
GLUCOSE BLD-MCNC: 21 MG/DL (ref 70–99)
GLUCOSE BLD-MCNC: 236 MG/DL (ref 70–99)
GLUCOSE BLD-MCNC: 504 MG/DL (ref 70–99)
GLUCOSE BLD-MCNC: 527 MG/DL (ref 70–99)
GLUCOSE BLD-MCNC: 56 MG/DL (ref 70–99)
GLUCOSE BLD-MCNC: 57 MG/DL (ref 70–99)
GLUCOSE BLD-MCNC: 57 MG/DL (ref 70–99)
GLUCOSE BLD-MCNC: 58 MG/DL (ref 70–99)
GLUCOSE BLD-MCNC: 60 MG/DL (ref 70–99)
GLUCOSE BLD-MCNC: 60 MG/DL (ref 70–99)
GLUCOSE BLD-MCNC: 61 MG/DL (ref 70–99)
GLUCOSE BLD-MCNC: 62 MG/DL (ref 70–99)
GLUCOSE BLD-MCNC: 64 MG/DL (ref 70–99)
GLUCOSE BLD-MCNC: 64 MG/DL (ref 70–99)
GLUCOSE BLD-MCNC: 66 MG/DL (ref 70–99)
GLUCOSE BLD-MCNC: 68 MG/DL (ref 70–99)
GLUCOSE BLD-MCNC: 68 MG/DL (ref 70–99)
GLUCOSE BLD-MCNC: 69 MG/DL (ref 70–99)
GLUCOSE BLD-MCNC: 70 MG/DL (ref 70–99)
GLUCOSE BLD-MCNC: 71 MG/DL (ref 70–99)
GLUCOSE BLD-MCNC: 72 MG/DL (ref 70–99)
GLUCOSE BLD-MCNC: 73 MG/DL (ref 70–99)
GLUCOSE BLD-MCNC: 74 MG/DL (ref 70–99)
GLUCOSE BLD-MCNC: 74 MG/DL (ref 70–99)
GLUCOSE BLD-MCNC: 77 MG/DL (ref 70–99)
GLUCOSE BLD-MCNC: 79 MG/DL (ref 70–99)
GLUCOSE BLD-MCNC: 80 MG/DL (ref 70–99)
GLUCOSE BLD-MCNC: 81 MG/DL (ref 70–99)
GLUCOSE BLD-MCNC: 81 MG/DL (ref 70–99)
GLUCOSE BLD-MCNC: 82 MG/DL (ref 70–99)
GLUCOSE BLD-MCNC: 85 MG/DL (ref 70–99)
GLUCOSE BLD-MCNC: 86 MG/DL (ref 70–99)
GLUCOSE BLD-MCNC: 88 MG/DL (ref 70–99)
GLUCOSE BLD-MCNC: 89 MG/DL (ref 70–99)
GLUCOSE BLD-MCNC: 91 MG/DL (ref 70–99)
GLUCOSE BLD-MCNC: 92 MG/DL (ref 70–99)
GLUCOSE BLD-MCNC: 93 MG/DL (ref 70–99)
GLUCOSE BLD-MCNC: 94 MG/DL (ref 70–99)
GLUCOSE BLD-MCNC: 95 MG/DL (ref 70–99)
GLUCOSE BLD-MCNC: 98 MG/DL (ref 70–99)
GLUCOSE SERPL-MCNC: 100 MG/DL (ref 70–99)
GLUCOSE SERPL-MCNC: 107 MG/DL (ref 70–99)
GLUCOSE SERPL-MCNC: 108 MG/DL (ref 70–99)
GLUCOSE SERPL-MCNC: 108 MG/DL (ref 70–99)
GLUCOSE SERPL-MCNC: 115 MG/DL (ref 70–99)
GLUCOSE SERPL-MCNC: 117 MG/DL (ref 70–99)
GLUCOSE SERPL-MCNC: 118 MG/DL (ref 70–99)
GLUCOSE SERPL-MCNC: 121 MG/DL (ref 70–99)
GLUCOSE SERPL-MCNC: 124 MG/DL (ref 70–99)
GLUCOSE SERPL-MCNC: 171 MG/DL (ref 70–99)
GLUCOSE SERPL-MCNC: 522 MG/DL (ref 70–99)
GLUCOSE SERPL-MCNC: 70 MG/DL (ref 70–99)
GLUCOSE SERPL-MCNC: 83 MG/DL (ref 70–99)
GLUCOSE SERPL-MCNC: 89 MG/DL (ref 70–99)
GLUCOSE SERPL-MCNC: 89 MG/DL (ref 70–99)
GLUCOSE SERPL-MCNC: 93 MG/DL (ref 70–99)
GLUCOSE SERPL-MCNC: 96 MG/DL (ref 70–99)
GLUCOSE SERPL-MCNC: 96 MG/DL (ref 70–99)
GLUCOSE SERPL-MCNC: 98 MG/DL (ref 70–99)
GLUCOSE, URINE: NEGATIVE MG/DL
GRAM SMEAR: NORMAL
HBA1C MFR BLD: 5.4 % (ref 4.2–6.3)
HCO3 ARTERIAL: 20.4 MMOL/L (ref 18–23)
HCO3 ARTERIAL: 20.8 MMOL/L (ref 18–23)
HCO3 ARTERIAL: 22.2 MMOL/L (ref 18–23)
HCO3 ARTERIAL: 22.6 MMOL/L (ref 18–23)
HCO3 VENOUS: 29.8 MMOL/L (ref 19–25)
HCT VFR BLD CALC: 20.4 % (ref 42–52)
HCT VFR BLD CALC: 21.1 % (ref 42–52)
HCT VFR BLD CALC: 23.4 % (ref 42–52)
HCT VFR BLD CALC: 23.9 % (ref 42–52)
HCT VFR BLD CALC: 24.4 % (ref 42–52)
HCT VFR BLD CALC: 24.4 % (ref 42–52)
HCT VFR BLD CALC: 24.5 % (ref 42–52)
HCT VFR BLD CALC: 25 % (ref 42–52)
HCT VFR BLD CALC: 25.6 % (ref 42–52)
HCT VFR BLD CALC: 25.8 % (ref 42–52)
HCT VFR BLD CALC: 25.8 % (ref 42–52)
HCT VFR BLD CALC: 27 % (ref 42–52)
HCT VFR BLD CALC: 27.4 % (ref 42–52)
HCT VFR BLD CALC: 30 % (ref 42–52)
HCT VFR BLD CALC: 31.4 % (ref 42–52)
HCT VFR BLD CALC: 32.5 % (ref 42–52)
HDLC SERPL-MCNC: 36 MG/DL
HEMOCCULT SP1 STL QL: NEGATIVE
HEMOGLOBIN: 10.2 GM/DL (ref 13.5–18)
HEMOGLOBIN: 6 GM/DL (ref 13.5–18)
HEMOGLOBIN: 6.2 GM/DL (ref 13.5–18)
HEMOGLOBIN: 7 GM/DL (ref 13.5–18)
HEMOGLOBIN: 7.1 GM/DL (ref 13.5–18)
HEMOGLOBIN: 7.2 GM/DL (ref 13.5–18)
HEMOGLOBIN: 7.4 GM/DL (ref 13.5–18)
HEMOGLOBIN: 7.5 GM/DL (ref 13.5–18)
HEMOGLOBIN: 7.5 GM/DL (ref 13.5–18)
HEMOGLOBIN: 7.6 GM/DL (ref 13.5–18)
HEMOGLOBIN: 7.7 GM/DL (ref 13.5–18)
HEMOGLOBIN: 7.9 GM/DL (ref 13.5–18)
HEMOGLOBIN: 8 GM/DL (ref 13.5–18)
HEMOGLOBIN: 8.2 GM/DL (ref 13.5–18)
HEMOGLOBIN: 8.6 GM/DL (ref 13.5–18)
HEMOGLOBIN: 9.3 GM/DL (ref 13.5–18)
IMMATURE NEUTROPHIL %: 0.8 % (ref 0–0.43)
IMMATURE NEUTROPHIL %: 0.8 % (ref 0–0.43)
IMMATURE NEUTROPHIL %: 0.9 % (ref 0–0.43)
IMMATURE NEUTROPHIL %: 1 % (ref 0–0.43)
IMMATURE NEUTROPHIL %: 1.4 % (ref 0–0.43)
IMMATURE NEUTROPHIL %: 1.7 % (ref 0–0.43)
IMMATURE NEUTROPHIL %: 2.2 % (ref 0–0.43)
KETONES, URINE: NEGATIVE MG/DL
L PNEUMO AG UR QL IA: NEGATIVE
LACTATE: 1.3 MMOL/L (ref 0.5–1.9)
LACTATE: 1.9 MMOL/L (ref 0.5–1.9)
LACTATE: 2.1 MMOL/L (ref 0.5–1.9)
LACTATE: 2.7 MMOL/L (ref 0.5–1.9)
LACTIC ACID, SEPSIS: 1.1 MMOL/L (ref 0.5–1.9)
LACTIC ACID, SEPSIS: 1.9 MMOL/L (ref 0.5–1.9)
LACTIC ACID, SEPSIS: 3.4 MMOL/L (ref 0.5–1.9)
LACTIC ACID, SEPSIS: 5 MMOL/L (ref 0.5–1.9)
LDLC SERPL CALC-MCNC: 84 MG/DL
LEUKOCYTE ESTERASE, URINE: NEGATIVE
LV EF: 58 %
LV EF: 58 %
LV EF: 65 %
LVEF MODALITY: NORMAL
LYMPHOCYTES ABSOLUTE: 0.7 K/CU MM
LYMPHOCYTES ABSOLUTE: 0.7 K/CU MM
LYMPHOCYTES ABSOLUTE: 0.8 K/CU MM
LYMPHOCYTES ABSOLUTE: 0.9 K/CU MM
LYMPHOCYTES ABSOLUTE: 1.3 K/CU MM
LYMPHOCYTES ABSOLUTE: 1.4 K/CU MM
LYMPHOCYTES ABSOLUTE: 1.6 K/CU MM
LYMPHOCYTES ABSOLUTE: 1.6 K/CU MM
LYMPHOCYTES ABSOLUTE: 1.8 K/CU MM
LYMPHOCYTES ABSOLUTE: 2.2 K/CU MM
LYMPHOCYTES RELATIVE PERCENT: 2.3 % (ref 24–44)
LYMPHOCYTES RELATIVE PERCENT: 2.4 % (ref 24–44)
LYMPHOCYTES RELATIVE PERCENT: 2.9 % (ref 24–44)
LYMPHOCYTES RELATIVE PERCENT: 3.7 % (ref 24–44)
LYMPHOCYTES RELATIVE PERCENT: 4.8 % (ref 24–44)
LYMPHOCYTES RELATIVE PERCENT: 5 % (ref 24–44)
LYMPHOCYTES RELATIVE PERCENT: 7 % (ref 24–44)
LYMPHOCYTES RELATIVE PERCENT: 8 % (ref 24–44)
LYMPHOCYTES RELATIVE PERCENT: 8.6 % (ref 24–44)
LYMPHOCYTES RELATIVE PERCENT: 9.2 % (ref 24–44)
LYMPHOCYTES RELATIVE PERCENT: 9.7 % (ref 24–44)
LYMPHOCYTES RELATIVE PERCENT: 9.8 % (ref 24–44)
Lab: NORMAL
MAGNESIUM: 1.5 MG/DL (ref 1.8–2.4)
MAGNESIUM: 1.6 MG/DL (ref 1.8–2.4)
MAGNESIUM: 1.7 MG/DL (ref 1.8–2.4)
MAGNESIUM: 1.8 MG/DL (ref 1.8–2.4)
MAGNESIUM: 2 MG/DL (ref 1.8–2.4)
MAGNESIUM: 2.1 MG/DL (ref 1.8–2.4)
MAGNESIUM: 2.1 MG/DL (ref 1.8–2.4)
MAGNESIUM: 2.3 MG/DL (ref 1.8–2.4)
MAGNESIUM: 2.4 MG/DL (ref 1.8–2.4)
MCH RBC QN AUTO: 25.1 PG (ref 27–31)
MCH RBC QN AUTO: 25.7 PG (ref 27–31)
MCH RBC QN AUTO: 25.8 PG (ref 27–31)
MCH RBC QN AUTO: 25.9 PG (ref 27–31)
MCH RBC QN AUTO: 26 PG (ref 27–31)
MCH RBC QN AUTO: 26 PG (ref 27–31)
MCH RBC QN AUTO: 26.1 PG (ref 27–31)
MCH RBC QN AUTO: 26.5 PG (ref 27–31)
MCH RBC QN AUTO: 26.6 PG (ref 27–31)
MCH RBC QN AUTO: 26.7 PG (ref 27–31)
MCH RBC QN AUTO: 27.3 PG (ref 27–31)
MCH RBC QN AUTO: 27.4 PG (ref 27–31)
MCH RBC QN AUTO: 27.4 PG (ref 27–31)
MCHC RBC AUTO-ENTMCNC: 27.1 % (ref 32–36)
MCHC RBC AUTO-ENTMCNC: 28.6 % (ref 32–36)
MCHC RBC AUTO-ENTMCNC: 29.4 % (ref 32–36)
MCHC RBC AUTO-ENTMCNC: 29.4 % (ref 32–36)
MCHC RBC AUTO-ENTMCNC: 29.6 % (ref 32–36)
MCHC RBC AUTO-ENTMCNC: 29.7 % (ref 32–36)
MCHC RBC AUTO-ENTMCNC: 29.8 % (ref 32–36)
MCHC RBC AUTO-ENTMCNC: 30.3 % (ref 32–36)
MCHC RBC AUTO-ENTMCNC: 30.4 % (ref 32–36)
MCHC RBC AUTO-ENTMCNC: 30.6 % (ref 32–36)
MCHC RBC AUTO-ENTMCNC: 30.7 % (ref 32–36)
MCHC RBC AUTO-ENTMCNC: 30.8 % (ref 32–36)
MCHC RBC AUTO-ENTMCNC: 30.9 % (ref 32–36)
MCV RBC AUTO: 86.6 FL (ref 78–100)
MCV RBC AUTO: 87.2 FL (ref 78–100)
MCV RBC AUTO: 87.4 FL (ref 78–100)
MCV RBC AUTO: 87.5 FL (ref 78–100)
MCV RBC AUTO: 87.6 FL (ref 78–100)
MCV RBC AUTO: 87.9 FL (ref 78–100)
MCV RBC AUTO: 88.1 FL (ref 78–100)
MCV RBC AUTO: 88.6 FL (ref 78–100)
MCV RBC AUTO: 89 FL (ref 78–100)
MCV RBC AUTO: 89.1 FL (ref 78–100)
MCV RBC AUTO: 94.9 FL (ref 78–100)
METAMYELOCYTES ABSOLUTE COUNT: 0.33 K/CU MM
METAMYELOCYTES PERCENT: 1 %
METHEMOGLOBIN ARTERIAL: 1.5 %
METHEMOGLOBIN ARTERIAL: 1.6 %
METHEMOGLOBIN ARTERIAL: 1.7 %
MONOCYTES ABSOLUTE: 0.5 K/CU MM
MONOCYTES ABSOLUTE: 0.7 K/CU MM
MONOCYTES ABSOLUTE: 0.8 K/CU MM
MONOCYTES ABSOLUTE: 0.8 K/CU MM
MONOCYTES ABSOLUTE: 0.9 K/CU MM
MONOCYTES ABSOLUTE: 1 K/CU MM
MONOCYTES ABSOLUTE: 1.1 K/CU MM
MONOCYTES ABSOLUTE: 1.2 K/CU MM
MONOCYTES ABSOLUTE: 1.2 K/CU MM
MONOCYTES ABSOLUTE: 1.3 K/CU MM
MONOCYTES ABSOLUTE: 1.5 K/CU MM
MONOCYTES ABSOLUTE: 1.6 K/CU MM
MONOCYTES RELATIVE PERCENT: 1.8 % (ref 0–4)
MONOCYTES RELATIVE PERCENT: 12.4 % (ref 0–4)
MONOCYTES RELATIVE PERCENT: 2 % (ref 0–4)
MONOCYTES RELATIVE PERCENT: 3 % (ref 0–4)
MONOCYTES RELATIVE PERCENT: 3 % (ref 0–4)
MONOCYTES RELATIVE PERCENT: 3.4 % (ref 0–4)
MONOCYTES RELATIVE PERCENT: 3.7 % (ref 0–4)
MONOCYTES RELATIVE PERCENT: 6.9 % (ref 0–4)
MONOCYTES RELATIVE PERCENT: 7.4 % (ref 0–4)
MONOCYTES RELATIVE PERCENT: 7.7 % (ref 0–4)
MONOCYTES RELATIVE PERCENT: 8.6 % (ref 0–4)
MONOCYTES RELATIVE PERCENT: 8.8 % (ref 0–4)
MRSA, DNA, NASAL: NEGATIVE
NITRITE URINE, QUANTITATIVE: NEGATIVE
NUCLEATED RBC %: 0 %
O2 SAT, VEN: 29.7 % (ref 50–70)
O2 SATURATION: 11.3 % (ref 96–97)
O2 SATURATION: 88.3 % (ref 96–97)
O2 SATURATION: 88.6 % (ref 96–97)
O2 SATURATION: 96.6 % (ref 96–97)
PCO2 ARTERIAL: 32 MMHG (ref 32–45)
PCO2 ARTERIAL: 32 MMHG (ref 32–45)
PCO2 ARTERIAL: 46 MMHG (ref 32–45)
PCO2 ARTERIAL: 53.6 MMHG (ref 32–45)
PCO2, VEN: 46 MMHG (ref 38–52)
PDW BLD-RTO: 15.3 % (ref 11.7–14.9)
PDW BLD-RTO: 15.4 % (ref 11.7–14.9)
PDW BLD-RTO: 15.7 % (ref 11.7–14.9)
PDW BLD-RTO: 15.7 % (ref 11.7–14.9)
PDW BLD-RTO: 15.9 % (ref 11.7–14.9)
PDW BLD-RTO: 16.2 % (ref 11.7–14.9)
PDW BLD-RTO: 16.3 % (ref 11.7–14.9)
PDW BLD-RTO: 16.5 % (ref 11.7–14.9)
PDW BLD-RTO: 16.6 % (ref 11.7–14.9)
PDW BLD-RTO: 16.6 % (ref 11.7–14.9)
PDW BLD-RTO: 16.7 % (ref 11.7–14.9)
PDW BLD-RTO: 17 % (ref 11.7–14.9)
PDW BLD-RTO: 17.3 % (ref 11.7–14.9)
PH BLOOD: 7.19 (ref 7.34–7.45)
PH BLOOD: 7.3 (ref 7.34–7.45)
PH BLOOD: 7.42 (ref 7.34–7.45)
PH BLOOD: 7.45 (ref 7.34–7.45)
PH VENOUS: 7.42 (ref 7.32–7.42)
PH, URINE: 6.5 (ref 5–8)
PHOSPHORUS: 1.4 MG/DL (ref 2.5–4.9)
PHOSPHORUS: 1.4 MG/DL (ref 2.5–4.9)
PHOSPHORUS: 2 MG/DL (ref 2.5–4.9)
PHOSPHORUS: 2 MG/DL (ref 2.5–4.9)
PHOSPHORUS: 2.1 MG/DL (ref 2.5–4.9)
PHOSPHORUS: 2.2 MG/DL (ref 2.5–4.9)
PHOSPHORUS: 2.2 MG/DL (ref 2.5–4.9)
PHOSPHORUS: 2.4 MG/DL (ref 2.5–4.9)
PHOSPHORUS: 2.5 MG/DL (ref 2.5–4.9)
PHOSPHORUS: 2.6 MG/DL (ref 2.5–4.9)
PHOSPHORUS: 2.9 MG/DL (ref 2.5–4.9)
PHOSPHORUS: 2.9 MG/DL (ref 2.5–4.9)
PHOSPHORUS: 3.3 MG/DL (ref 2.5–4.9)
PLATELET # BLD: 146 K/CU MM (ref 140–440)
PLATELET # BLD: 152 K/CU MM (ref 140–440)
PLATELET # BLD: 176 K/CU MM (ref 140–440)
PLATELET # BLD: 205 K/CU MM (ref 140–440)
PLATELET # BLD: 212 K/CU MM (ref 140–440)
PLATELET # BLD: 243 K/CU MM (ref 140–440)
PLATELET # BLD: 262 K/CU MM (ref 140–440)
PLATELET # BLD: 289 K/CU MM (ref 140–440)
PLATELET # BLD: 303 K/CU MM (ref 140–440)
PLATELET # BLD: 305 K/CU MM (ref 140–440)
PLATELET # BLD: 305 K/CU MM (ref 140–440)
PLATELET # BLD: 388 K/CU MM (ref 140–440)
PLATELET # BLD: 470 K/CU MM (ref 140–440)
PMV BLD AUTO: 8.8 FL (ref 7.5–11.1)
PMV BLD AUTO: 8.8 FL (ref 7.5–11.1)
PMV BLD AUTO: 8.9 FL (ref 7.5–11.1)
PMV BLD AUTO: 9 FL (ref 7.5–11.1)
PMV BLD AUTO: 9 FL (ref 7.5–11.1)
PMV BLD AUTO: 9.1 FL (ref 7.5–11.1)
PMV BLD AUTO: 9.2 FL (ref 7.5–11.1)
PMV BLD AUTO: 9.6 FL (ref 7.5–11.1)
PMV BLD AUTO: 9.7 FL (ref 7.5–11.1)
PO2 ARTERIAL: 13.7 MMHG (ref 75–100)
PO2 ARTERIAL: 159 MMHG (ref 75–100)
PO2 ARTERIAL: 61 MMHG (ref 75–100)
PO2 ARTERIAL: 66 MMHG (ref 75–100)
PO2, VEN: 23 MMHG (ref 28–48)
POC CALCIUM: 1.33 MMOL/L (ref 1.12–1.32)
POTASSIUM SERPL-SCNC: 2.8 MMOL/L (ref 3.5–5.1)
POTASSIUM SERPL-SCNC: 2.9 MMOL/L (ref 3.5–5.1)
POTASSIUM SERPL-SCNC: 3 MMOL/L (ref 3.5–5.1)
POTASSIUM SERPL-SCNC: 3 MMOL/L (ref 3.5–5.1)
POTASSIUM SERPL-SCNC: 3.1 MMOL/L (ref 3.5–5.1)
POTASSIUM SERPL-SCNC: 3.2 MMOL/L (ref 3.5–4.5)
POTASSIUM SERPL-SCNC: 3.2 MMOL/L (ref 3.5–5.1)
POTASSIUM SERPL-SCNC: 3.2 MMOL/L (ref 3.5–5.1)
POTASSIUM SERPL-SCNC: 3.3 MMOL/L (ref 3.5–5.1)
POTASSIUM SERPL-SCNC: 3.3 MMOL/L (ref 3.5–5.1)
POTASSIUM SERPL-SCNC: 3.4 MMOL/L (ref 3.5–5.1)
POTASSIUM SERPL-SCNC: 3.5 MMOL/L (ref 3.5–5.1)
POTASSIUM SERPL-SCNC: 3.6 MMOL/L (ref 3.5–5.1)
POTASSIUM SERPL-SCNC: 3.6 MMOL/L (ref 3.5–5.1)
POTASSIUM SERPL-SCNC: 4.2 MMOL/L (ref 3.5–5.1)
PRO-BNP: 2119 PG/ML
PROCALCITONIN SERPL-MCNC: 0.26 NG/ML
PROCALCITONIN SERPL-MCNC: 0.36 NG/ML
PROTEIN UA: 100 MG/DL
RBC # BLD: 2.32 M/CU MM (ref 4.6–6.2)
RBC # BLD: 2.4 M/CU MM (ref 4.6–6.2)
RBC # BLD: 2.63 M/CU MM (ref 4.6–6.2)
RBC # BLD: 2.72 M/CU MM (ref 4.6–6.2)
RBC # BLD: 2.73 M/CU MM (ref 4.6–6.2)
RBC # BLD: 2.74 M/CU MM (ref 4.6–6.2)
RBC # BLD: 2.77 M/CU MM (ref 4.6–6.2)
RBC # BLD: 2.83 M/CU MM (ref 4.6–6.2)
RBC # BLD: 2.86 M/CU MM (ref 4.6–6.2)
RBC # BLD: 2.89 M/CU MM (ref 4.6–6.2)
RBC # BLD: 2.96 M/CU MM (ref 4.6–6.2)
RBC # BLD: 3.07 M/CU MM (ref 4.6–6.2)
RBC # BLD: 3.71 M/CU MM (ref 4.6–6.2)
RBC URINE: 4 /HPF (ref 0–3)
S PNEUM AG CSF QL: NORMAL
SEGMENTED NEUTROPHILS ABSOLUTE COUNT: 12.3 K/CU MM
SEGMENTED NEUTROPHILS ABSOLUTE COUNT: 12.5 K/CU MM
SEGMENTED NEUTROPHILS ABSOLUTE COUNT: 13.2 K/CU MM
SEGMENTED NEUTROPHILS ABSOLUTE COUNT: 13.5 K/CU MM
SEGMENTED NEUTROPHILS ABSOLUTE COUNT: 14.3 K/CU MM
SEGMENTED NEUTROPHILS ABSOLUTE COUNT: 23.1 K/CU MM
SEGMENTED NEUTROPHILS ABSOLUTE COUNT: 23.9 K/CU MM
SEGMENTED NEUTROPHILS ABSOLUTE COUNT: 24.5 K/CU MM
SEGMENTED NEUTROPHILS ABSOLUTE COUNT: 26 K/CU MM
SEGMENTED NEUTROPHILS ABSOLUTE COUNT: 26.1 K/CU MM
SEGMENTED NEUTROPHILS ABSOLUTE COUNT: 28.3 K/CU MM
SEGMENTED NEUTROPHILS ABSOLUTE COUNT: 9.4 K/CU MM
SEGMENTED NEUTROPHILS RELATIVE PERCENT: 78.9 % (ref 36–66)
SEGMENTED NEUTROPHILS RELATIVE PERCENT: 79.2 % (ref 36–66)
SEGMENTED NEUTROPHILS RELATIVE PERCENT: 80 % (ref 36–66)
SEGMENTED NEUTROPHILS RELATIVE PERCENT: 80.6 % (ref 36–66)
SEGMENTED NEUTROPHILS RELATIVE PERCENT: 81 % (ref 36–66)
SEGMENTED NEUTROPHILS RELATIVE PERCENT: 81.1 % (ref 36–66)
SEGMENTED NEUTROPHILS RELATIVE PERCENT: 86 % (ref 36–66)
SEGMENTED NEUTROPHILS RELATIVE PERCENT: 86.4 % (ref 36–66)
SEGMENTED NEUTROPHILS RELATIVE PERCENT: 91 % (ref 36–66)
SEGMENTED NEUTROPHILS RELATIVE PERCENT: 91.9 % (ref 36–66)
SEGMENTED NEUTROPHILS RELATIVE PERCENT: 93 % (ref 36–66)
SEGMENTED NEUTROPHILS RELATIVE PERCENT: 93.9 % (ref 36–66)
SODIUM BLD-SCNC: 130 MMOL/L (ref 135–145)
SODIUM BLD-SCNC: 130 MMOL/L (ref 135–145)
SODIUM BLD-SCNC: 134 MMOL/L (ref 135–145)
SODIUM BLD-SCNC: 134 MMOL/L (ref 135–145)
SODIUM BLD-SCNC: 135 MMOL/L (ref 135–145)
SODIUM BLD-SCNC: 136 MMOL/L (ref 135–145)
SODIUM BLD-SCNC: 136 MMOL/L (ref 135–145)
SODIUM BLD-SCNC: 137 MMOL/L (ref 135–145)
SODIUM BLD-SCNC: 137 MMOL/L (ref 135–145)
SODIUM BLD-SCNC: 138 MMOL/L (ref 135–145)
SODIUM BLD-SCNC: 139 MMOL/L (ref 135–145)
SODIUM BLD-SCNC: 140 MMOL/L (ref 135–145)
SODIUM BLD-SCNC: 140 MMOL/L (ref 138–146)
SODIUM BLD-SCNC: 141 MMOL/L (ref 135–145)
SODIUM BLD-SCNC: 141 MMOL/L (ref 135–145)
SOURCE, BLOOD GAS: ABNORMAL
SPECIFIC GRAVITY UA: 1.01 (ref 1–1.03)
SPECIMEN DESCRIPTION: NORMAL
SPECIMEN: NORMAL
SQUAMOUS EPITHELIAL: <1 /HPF
STATUS: NORMAL
TOTAL IMMATURE NEUTOROPHIL: 0.09 K/CU MM
TOTAL IMMATURE NEUTOROPHIL: 0.14 K/CU MM
TOTAL IMMATURE NEUTOROPHIL: 0.16 K/CU MM
TOTAL IMMATURE NEUTOROPHIL: 0.16 K/CU MM
TOTAL IMMATURE NEUTOROPHIL: 0.17 K/CU MM
TOTAL IMMATURE NEUTOROPHIL: 0.21 K/CU MM
TOTAL IMMATURE NEUTOROPHIL: 0.26 K/CU MM
TOTAL IMMATURE NEUTOROPHIL: 0.29 K/CU MM
TOTAL IMMATURE NEUTOROPHIL: 0.51 K/CU MM
TOTAL IMMATURE NEUTOROPHIL: 0.59 K/CU MM
TOTAL NUCLEATED RBC: 0 K/CU MM
TOTAL PROTEIN: 5 GM/DL (ref 6.4–8.2)
TOTAL PROTEIN: 5.2 GM/DL (ref 6.4–8.2)
TOTAL PROTEIN: 7.7 GM/DL (ref 6.4–8.2)
TRANSFUSION STATUS: NORMAL
TRANSITIONAL EPITHELIAL: <1 /HPF
TRICHOMONAS: ABNORMAL /HPF
TRIGL SERPL-MCNC: 159 MG/DL
TRIGL SERPL-MCNC: 161 MG/DL
TRIGL SERPL-MCNC: 165 MG/DL
TRIGL SERPL-MCNC: 179 MG/DL
TROPONIN T: <0.01 NG/ML
UNIT DIVISION: 0
UNIT NUMBER: NORMAL
UROBILINOGEN, URINE: 4 MG/DL (ref 0.2–1)
WBC # BLD: 11.9 K/CU MM (ref 4–10.5)
WBC # BLD: 15.3 K/CU MM (ref 4–10.5)
WBC # BLD: 15.4 K/CU MM (ref 4–10.5)
WBC # BLD: 15.6 K/CU MM (ref 4–10.5)
WBC # BLD: 16 K/CU MM (ref 4–10.5)
WBC # BLD: 16.3 K/CU MM (ref 4–10.5)
WBC # BLD: 18.1 K/CU MM (ref 4–10.5)
WBC # BLD: 25.1 K/CU MM (ref 4–10.5)
WBC # BLD: 26.9 K/CU MM (ref 4–10.5)
WBC # BLD: 27.8 K/CU MM (ref 4–10.5)
WBC # BLD: 28 K/CU MM (ref 4–10.5)
WBC # BLD: 30.1 K/CU MM (ref 4–10.5)
WBC # BLD: 32.6 K/CU MM (ref 4–10.5)
WBC # BLD: ABNORMAL 10*3/UL
WBC UA: 8 /HPF (ref 0–2)

## 2023-01-01 PROCEDURE — 51702 INSERT TEMP BLADDER CATH: CPT

## 2023-01-01 PROCEDURE — 83735 ASSAY OF MAGNESIUM: CPT

## 2023-01-01 PROCEDURE — 6360000002 HC RX W HCPCS: Performed by: PHYSICIAN ASSISTANT

## 2023-01-01 PROCEDURE — 70450 CT HEAD/BRAIN W/O DYE: CPT

## 2023-01-01 PROCEDURE — 94669 MECHANICAL CHEST WALL OSCILL: CPT

## 2023-01-01 PROCEDURE — 94668 MNPJ CHEST WALL SBSQ: CPT

## 2023-01-01 PROCEDURE — 71045 X-RAY EXAM CHEST 1 VIEW: CPT

## 2023-01-01 PROCEDURE — 2500000003 HC RX 250 WO HCPCS

## 2023-01-01 PROCEDURE — 82962 GLUCOSE BLOOD TEST: CPT

## 2023-01-01 PROCEDURE — 2700000000 HC OXYGEN THERAPY PER DAY

## 2023-01-01 PROCEDURE — 94640 AIRWAY INHALATION TREATMENT: CPT

## 2023-01-01 PROCEDURE — 6370000000 HC RX 637 (ALT 250 FOR IP): Performed by: STUDENT IN AN ORGANIZED HEALTH CARE EDUCATION/TRAINING PROGRAM

## 2023-01-01 PROCEDURE — 89220 SPUTUM SPECIMEN COLLECTION: CPT

## 2023-01-01 PROCEDURE — 6360000002 HC RX W HCPCS: Performed by: STUDENT IN AN ORGANIZED HEALTH CARE EDUCATION/TRAINING PROGRAM

## 2023-01-01 PROCEDURE — 6360000002 HC RX W HCPCS

## 2023-01-01 PROCEDURE — 36592 COLLECT BLOOD FROM PICC: CPT

## 2023-01-01 PROCEDURE — 83605 ASSAY OF LACTIC ACID: CPT

## 2023-01-01 PROCEDURE — 94761 N-INVAS EAR/PLS OXIMETRY MLT: CPT

## 2023-01-01 PROCEDURE — 85025 COMPLETE CBC W/AUTO DIFF WBC: CPT

## 2023-01-01 PROCEDURE — 84145 PROCALCITONIN (PCT): CPT

## 2023-01-01 PROCEDURE — 6360000002 HC RX W HCPCS: Performed by: SPECIALIST

## 2023-01-01 PROCEDURE — 82330 ASSAY OF CALCIUM: CPT

## 2023-01-01 PROCEDURE — 99232 SBSQ HOSP IP/OBS MODERATE 35: CPT | Performed by: PHYSICIAN ASSISTANT

## 2023-01-01 PROCEDURE — 80053 COMPREHEN METABOLIC PANEL: CPT

## 2023-01-01 PROCEDURE — 6360000004 HC RX CONTRAST MEDICATION: Performed by: STUDENT IN AN ORGANIZED HEALTH CARE EDUCATION/TRAINING PROGRAM

## 2023-01-01 PROCEDURE — 6360000002 HC RX W HCPCS: Performed by: FAMILY MEDICINE

## 2023-01-01 PROCEDURE — 2500000003 HC RX 250 WO HCPCS: Performed by: INTERNAL MEDICINE

## 2023-01-01 PROCEDURE — 94003 VENT MGMT INPAT SUBQ DAY: CPT

## 2023-01-01 PROCEDURE — 2580000003 HC RX 258: Performed by: NURSE PRACTITIONER

## 2023-01-01 PROCEDURE — 37799 UNLISTED PX VASCULAR SURGERY: CPT

## 2023-01-01 PROCEDURE — 2000000000 HC ICU R&B

## 2023-01-01 PROCEDURE — 1250000000 HC SEMI PRIVATE HOSPICE R&B

## 2023-01-01 PROCEDURE — 80069 RENAL FUNCTION PANEL: CPT

## 2023-01-01 PROCEDURE — 36620 INSERTION CATHETER ARTERY: CPT

## 2023-01-01 PROCEDURE — 85027 COMPLETE CBC AUTOMATED: CPT

## 2023-01-01 PROCEDURE — 6370000000 HC RX 637 (ALT 250 FOR IP): Performed by: INTERNAL MEDICINE

## 2023-01-01 PROCEDURE — 84100 ASSAY OF PHOSPHORUS: CPT

## 2023-01-01 PROCEDURE — 87899 AGENT NOS ASSAY W/OPTIC: CPT

## 2023-01-01 PROCEDURE — 86900 BLOOD TYPING SEROLOGIC ABO: CPT

## 2023-01-01 PROCEDURE — 80048 BASIC METABOLIC PNL TOTAL CA: CPT

## 2023-01-01 PROCEDURE — 2580000003 HC RX 258: Performed by: STUDENT IN AN ORGANIZED HEALTH CARE EDUCATION/TRAINING PROGRAM

## 2023-01-01 PROCEDURE — APPNB60 APP NON BILLABLE TIME 46-60 MINS: Performed by: PHYSICIAN ASSISTANT

## 2023-01-01 PROCEDURE — 84132 ASSAY OF SERUM POTASSIUM: CPT

## 2023-01-01 PROCEDURE — 6370000000 HC RX 637 (ALT 250 FOR IP): Performed by: PHYSICIAN ASSISTANT

## 2023-01-01 PROCEDURE — 99254 IP/OBS CNSLTJ NEW/EST MOD 60: CPT | Performed by: NURSE PRACTITIONER

## 2023-01-01 PROCEDURE — 87449 NOS EACH ORGANISM AG IA: CPT

## 2023-01-01 PROCEDURE — 93005 ELECTROCARDIOGRAM TRACING: CPT | Performed by: STUDENT IN AN ORGANIZED HEALTH CARE EDUCATION/TRAINING PROGRAM

## 2023-01-01 PROCEDURE — 2580000003 HC RX 258

## 2023-01-01 PROCEDURE — 31500 INSERT EMERGENCY AIRWAY: CPT

## 2023-01-01 PROCEDURE — C9113 INJ PANTOPRAZOLE SODIUM, VIA: HCPCS | Performed by: PHYSICIAN ASSISTANT

## 2023-01-01 PROCEDURE — 85018 HEMOGLOBIN: CPT

## 2023-01-01 PROCEDURE — 85379 FIBRIN DEGRADATION QUANT: CPT

## 2023-01-01 PROCEDURE — 99285 EMERGENCY DEPT VISIT HI MDM: CPT

## 2023-01-01 PROCEDURE — 85007 BL SMEAR W/DIFF WBC COUNT: CPT

## 2023-01-01 PROCEDURE — 2580000003 HC RX 258: Performed by: SPECIALIST

## 2023-01-01 PROCEDURE — 81001 URINALYSIS AUTO W/SCOPE: CPT

## 2023-01-01 PROCEDURE — 36556 INSERT NON-TUNNEL CV CATH: CPT

## 2023-01-01 PROCEDURE — 93005 ELECTROCARDIOGRAM TRACING: CPT

## 2023-01-01 PROCEDURE — 6360000002 HC RX W HCPCS: Performed by: INTERNAL MEDICINE

## 2023-01-01 PROCEDURE — 99233 SBSQ HOSP IP/OBS HIGH 50: CPT | Performed by: INTERNAL MEDICINE

## 2023-01-01 PROCEDURE — 2580000003 HC RX 258: Performed by: PHYSICIAN ASSISTANT

## 2023-01-01 PROCEDURE — 84478 ASSAY OF TRIGLYCERIDES: CPT

## 2023-01-01 PROCEDURE — 86850 RBC ANTIBODY SCREEN: CPT

## 2023-01-01 PROCEDURE — 94664 DEMO&/EVAL PT USE INHALER: CPT

## 2023-01-01 PROCEDURE — 83036 HEMOGLOBIN GLYCOSYLATED A1C: CPT

## 2023-01-01 PROCEDURE — 5A09457 ASSISTANCE WITH RESPIRATORY VENTILATION, 24-96 CONSECUTIVE HOURS, CONTINUOUS POSITIVE AIRWAY PRESSURE: ICD-10-PCS | Performed by: INTERNAL MEDICINE

## 2023-01-01 PROCEDURE — 6370000000 HC RX 637 (ALT 250 FOR IP)

## 2023-01-01 PROCEDURE — 85014 HEMATOCRIT: CPT

## 2023-01-01 PROCEDURE — 93010 ELECTROCARDIOGRAM REPORT: CPT | Performed by: INTERNAL MEDICINE

## 2023-01-01 PROCEDURE — 99232 SBSQ HOSP IP/OBS MODERATE 35: CPT | Performed by: INTERNAL MEDICINE

## 2023-01-01 PROCEDURE — 92526 ORAL FUNCTION THERAPY: CPT

## 2023-01-01 PROCEDURE — 70498 CT ANGIOGRAPHY NECK: CPT

## 2023-01-01 PROCEDURE — 02HV33Z INSERTION OF INFUSION DEVICE INTO SUPERIOR VENA CAVA, PERCUTANEOUS APPROACH: ICD-10-PCS

## 2023-01-01 PROCEDURE — 2500000003 HC RX 250 WO HCPCS: Performed by: SPECIALIST

## 2023-01-01 PROCEDURE — 2500000003 HC RX 250 WO HCPCS: Performed by: STUDENT IN AN ORGANIZED HEALTH CARE EDUCATION/TRAINING PROGRAM

## 2023-01-01 PROCEDURE — 1200000000 HC SEMI PRIVATE

## 2023-01-01 PROCEDURE — 87040 BLOOD CULTURE FOR BACTERIA: CPT

## 2023-01-01 PROCEDURE — 82270 OCCULT BLOOD FECES: CPT

## 2023-01-01 PROCEDURE — 2500000003 HC RX 250 WO HCPCS: Performed by: FAMILY MEDICINE

## 2023-01-01 PROCEDURE — 99233 SBSQ HOSP IP/OBS HIGH 50: CPT | Performed by: CLINICAL NURSE SPECIALIST

## 2023-01-01 PROCEDURE — 92610 EVALUATE SWALLOWING FUNCTION: CPT

## 2023-01-01 PROCEDURE — 6370000000 HC RX 637 (ALT 250 FOR IP): Performed by: SPECIALIST

## 2023-01-01 PROCEDURE — 82805 BLOOD GASES W/O2 SATURATION: CPT

## 2023-01-01 PROCEDURE — 84484 ASSAY OF TROPONIN QUANT: CPT

## 2023-01-01 PROCEDURE — 96365 THER/PROPH/DIAG IV INF INIT: CPT

## 2023-01-01 PROCEDURE — 0BH17EZ INSERTION OF ENDOTRACHEAL AIRWAY INTO TRACHEA, VIA NATURAL OR ARTIFICIAL OPENING: ICD-10-PCS | Performed by: INTERNAL MEDICINE

## 2023-01-01 PROCEDURE — 30233N1 TRANSFUSION OF NONAUTOLOGOUS RED BLOOD CELLS INTO PERIPHERAL VEIN, PERCUTANEOUS APPROACH: ICD-10-PCS | Performed by: INTERNAL MEDICINE

## 2023-01-01 PROCEDURE — P9016 RBC LEUKOCYTES REDUCED: HCPCS

## 2023-01-01 PROCEDURE — 93306 TTE W/DOPPLER COMPLETE: CPT

## 2023-01-01 PROCEDURE — 99223 1ST HOSP IP/OBS HIGH 75: CPT | Performed by: STUDENT IN AN ORGANIZED HEALTH CARE EDUCATION/TRAINING PROGRAM

## 2023-01-01 PROCEDURE — 93308 TTE F-UP OR LMTD: CPT

## 2023-01-01 PROCEDURE — 94002 VENT MGMT INPAT INIT DAY: CPT

## 2023-01-01 PROCEDURE — 03HC33Z INSERTION OF INFUSION DEVICE INTO LEFT RADIAL ARTERY, PERCUTANEOUS APPROACH: ICD-10-PCS

## 2023-01-01 PROCEDURE — 82803 BLOOD GASES ANY COMBINATION: CPT

## 2023-01-01 PROCEDURE — 36600 WITHDRAWAL OF ARTERIAL BLOOD: CPT

## 2023-01-01 PROCEDURE — 36430 TRANSFUSION BLD/BLD COMPNT: CPT

## 2023-01-01 PROCEDURE — 6360000002 HC RX W HCPCS: Performed by: NURSE PRACTITIONER

## 2023-01-01 PROCEDURE — 99291 CRITICAL CARE FIRST HOUR: CPT | Performed by: INTERNAL MEDICINE

## 2023-01-01 PROCEDURE — 86901 BLOOD TYPING SEROLOGIC RH(D): CPT

## 2023-01-01 PROCEDURE — 99222 1ST HOSP IP/OBS MODERATE 55: CPT | Performed by: INTERNAL MEDICINE

## 2023-01-01 PROCEDURE — 87641 MR-STAPH DNA AMP PROBE: CPT

## 2023-01-01 PROCEDURE — 93930 UPPER EXTREMITY STUDY: CPT

## 2023-01-01 PROCEDURE — 94667 MNPJ CHEST WALL 1ST: CPT

## 2023-01-01 PROCEDURE — 87205 SMEAR GRAM STAIN: CPT

## 2023-01-01 PROCEDURE — 87070 CULTURE OTHR SPECIMN AEROBIC: CPT

## 2023-01-01 PROCEDURE — 36415 COLL VENOUS BLD VENIPUNCTURE: CPT

## 2023-01-01 PROCEDURE — 96361 HYDRATE IV INFUSION ADD-ON: CPT

## 2023-01-01 PROCEDURE — 99232 SBSQ HOSP IP/OBS MODERATE 35: CPT | Performed by: CLINICAL NURSE SPECIALIST

## 2023-01-01 PROCEDURE — 83880 ASSAY OF NATRIURETIC PEPTIDE: CPT

## 2023-01-01 PROCEDURE — 94660 CPAP INITIATION&MGMT: CPT

## 2023-01-01 PROCEDURE — 86922 COMPATIBILITY TEST ANTIGLOB: CPT

## 2023-01-01 PROCEDURE — 99223 1ST HOSP IP/OBS HIGH 75: CPT | Performed by: INTERNAL MEDICINE

## 2023-01-01 PROCEDURE — 31720 CLEARANCE OF AIRWAYS: CPT

## 2023-01-01 PROCEDURE — 84295 ASSAY OF SERUM SODIUM: CPT

## 2023-01-01 PROCEDURE — 71275 CT ANGIOGRAPHY CHEST: CPT

## 2023-01-01 PROCEDURE — 5A1945Z RESPIRATORY VENTILATION, 24-96 CONSECUTIVE HOURS: ICD-10-PCS | Performed by: INTERNAL MEDICINE

## 2023-01-01 PROCEDURE — 80061 LIPID PANEL: CPT

## 2023-01-01 PROCEDURE — 93005 ELECTROCARDIOGRAM TRACING: CPT | Performed by: INTERNAL MEDICINE

## 2023-01-01 PROCEDURE — A4216 STERILE WATER/SALINE, 10 ML: HCPCS

## 2023-01-01 RX ORDER — ACETAMINOPHEN 325 MG/1
650 TABLET ORAL
Status: CANCELLED | OUTPATIENT
Start: 2023-01-01

## 2023-01-01 RX ORDER — ONDANSETRON 2 MG/ML
8 INJECTION INTRAMUSCULAR; INTRAVENOUS
Status: CANCELLED | OUTPATIENT
Start: 2023-01-01

## 2023-01-01 RX ORDER — ENOXAPARIN SODIUM 100 MG/ML
30 INJECTION SUBCUTANEOUS DAILY
Status: DISCONTINUED | OUTPATIENT
Start: 2023-01-01 | End: 2023-01-01 | Stop reason: HOSPADM

## 2023-01-01 RX ORDER — ACETAMINOPHEN 650 MG/1
650 SUPPOSITORY RECTAL EVERY 6 HOURS PRN
Status: DISCONTINUED | OUTPATIENT
Start: 2023-01-01 | End: 2023-01-01 | Stop reason: HOSPADM

## 2023-01-01 RX ORDER — MORPHINE SULFATE 2 MG/ML
2 INJECTION, SOLUTION INTRAMUSCULAR; INTRAVENOUS
Status: CANCELLED | OUTPATIENT
Start: 2023-01-01

## 2023-01-01 RX ORDER — SODIUM CHLORIDE 9 MG/ML
INJECTION, SOLUTION INTRAVENOUS PRN
Status: DISCONTINUED | OUTPATIENT
Start: 2023-01-01 | End: 2023-01-01 | Stop reason: HOSPADM

## 2023-01-01 RX ORDER — SODIUM CHLORIDE 9 MG/ML
5-250 INJECTION, SOLUTION INTRAVENOUS PRN
Status: CANCELLED | OUTPATIENT
Start: 2023-01-01

## 2023-01-01 RX ORDER — NOREPINEPHRINE BITARTRATE 0.06 MG/ML
INJECTION, SOLUTION INTRAVENOUS
Status: COMPLETED
Start: 2023-01-01 | End: 2023-01-01

## 2023-01-01 RX ORDER — SODIUM CHLORIDE 9 MG/ML
INJECTION, SOLUTION INTRAVENOUS CONTINUOUS
Status: CANCELLED | OUTPATIENT
Start: 2023-01-01

## 2023-01-01 RX ORDER — HEPARIN SODIUM (PORCINE) LOCK FLUSH IV SOLN 100 UNIT/ML 100 UNIT/ML
500 SOLUTION INTRAVENOUS PRN
Status: CANCELLED | OUTPATIENT
Start: 2023-01-01

## 2023-01-01 RX ORDER — PALONOSETRON 0.05 MG/ML
0.25 INJECTION, SOLUTION INTRAVENOUS ONCE
Status: CANCELLED | OUTPATIENT
Start: 2023-01-01 | End: 2023-01-01

## 2023-01-01 RX ORDER — DEXTROSE MONOHYDRATE 100 MG/ML
INJECTION, SOLUTION INTRAVENOUS CONTINUOUS
Status: DISCONTINUED | OUTPATIENT
Start: 2023-01-01 | End: 2023-01-01 | Stop reason: HOSPADM

## 2023-01-01 RX ORDER — MEPERIDINE HYDROCHLORIDE 50 MG/ML
12.5 INJECTION INTRAMUSCULAR; INTRAVENOUS; SUBCUTANEOUS PRN
Status: CANCELLED | OUTPATIENT
Start: 2023-01-01

## 2023-01-01 RX ORDER — MORPHINE SULFATE 4 MG/ML
4 INJECTION, SOLUTION INTRAMUSCULAR; INTRAVENOUS
Status: DISCONTINUED | OUTPATIENT
Start: 2023-01-01 | End: 2023-01-01

## 2023-01-01 RX ORDER — FENTANYL CITRATE 50 UG/ML
50 INJECTION, SOLUTION INTRAMUSCULAR; INTRAVENOUS PRN
Status: COMPLETED | OUTPATIENT
Start: 2023-01-01 | End: 2023-01-01

## 2023-01-01 RX ORDER — GLUCAGON 1 MG/ML
1 KIT INJECTION PRN
Status: DISCONTINUED | OUTPATIENT
Start: 2023-01-01 | End: 2023-01-01 | Stop reason: HOSPADM

## 2023-01-01 RX ORDER — SODIUM CHLORIDE, SODIUM LACTATE, POTASSIUM CHLORIDE, AND CALCIUM CHLORIDE .6; .31; .03; .02 G/100ML; G/100ML; G/100ML; G/100ML
30 INJECTION, SOLUTION INTRAVENOUS ONCE
Status: DISCONTINUED | OUTPATIENT
Start: 2023-01-01 | End: 2023-01-01

## 2023-01-01 RX ORDER — SODIUM CHLORIDE 0.9 % (FLUSH) 0.9 %
5-40 SYRINGE (ML) INJECTION PRN
Status: CANCELLED | OUTPATIENT
Start: 2023-01-01

## 2023-01-01 RX ORDER — MORPHINE SULFATE 4 MG/ML
4 INJECTION, SOLUTION INTRAMUSCULAR; INTRAVENOUS
Status: CANCELLED | OUTPATIENT
Start: 2023-01-01

## 2023-01-01 RX ORDER — MAGNESIUM SULFATE IN WATER 40 MG/ML
2000 INJECTION, SOLUTION INTRAVENOUS ONCE
Status: COMPLETED | OUTPATIENT
Start: 2023-01-01 | End: 2023-01-01

## 2023-01-01 RX ORDER — LABETALOL HYDROCHLORIDE 5 MG/ML
5 INJECTION, SOLUTION INTRAVENOUS ONCE
Status: COMPLETED | OUTPATIENT
Start: 2023-01-01 | End: 2023-01-01

## 2023-01-01 RX ORDER — ALBUTEROL SULFATE 90 UG/1
4 AEROSOL, METERED RESPIRATORY (INHALATION) PRN
Status: CANCELLED | OUTPATIENT
Start: 2023-01-01

## 2023-01-01 RX ORDER — SODIUM CHLORIDE, SODIUM LACTATE, POTASSIUM CHLORIDE, AND CALCIUM CHLORIDE .6; .31; .03; .02 G/100ML; G/100ML; G/100ML; G/100ML
500 INJECTION, SOLUTION INTRAVENOUS ONCE
Status: COMPLETED | OUTPATIENT
Start: 2023-01-01 | End: 2023-01-01

## 2023-01-01 RX ORDER — DEXTROSE MONOHYDRATE 100 MG/ML
INJECTION, SOLUTION INTRAVENOUS CONTINUOUS PRN
Status: DISCONTINUED | OUTPATIENT
Start: 2023-01-01 | End: 2023-01-01 | Stop reason: HOSPADM

## 2023-01-01 RX ORDER — SODIUM CHLORIDE 0.9 % (FLUSH) 0.9 %
5-40 SYRINGE (ML) INJECTION EVERY 12 HOURS SCHEDULED
Status: DISCONTINUED | OUTPATIENT
Start: 2023-01-01 | End: 2023-01-01

## 2023-01-01 RX ORDER — ACETAMINOPHEN 650 MG/1
650 SUPPOSITORY RECTAL EVERY 4 HOURS PRN
Status: DISCONTINUED | OUTPATIENT
Start: 2023-01-01 | End: 2023-08-13 | Stop reason: HOSPADM

## 2023-01-01 RX ORDER — FAMOTIDINE 10 MG/ML
20 INJECTION, SOLUTION INTRAVENOUS
Status: CANCELLED | OUTPATIENT
Start: 2023-01-01

## 2023-01-01 RX ORDER — SODIUM CHLORIDE 9 MG/ML
INJECTION, SOLUTION INTRAVENOUS PRN
Status: CANCELLED | OUTPATIENT
Start: 2023-01-01

## 2023-01-01 RX ORDER — FAMOTIDINE 10 MG/ML
20 INJECTION, SOLUTION INTRAVENOUS ONCE
Status: CANCELLED | OUTPATIENT
Start: 2023-01-01 | End: 2023-01-01

## 2023-01-01 RX ORDER — LANOLIN ALCOHOL/MO/W.PET/CERES
100 CREAM (GRAM) TOPICAL DAILY
Status: DISCONTINUED | OUTPATIENT
Start: 2023-01-01 | End: 2023-01-01 | Stop reason: HOSPADM

## 2023-01-01 RX ORDER — GLYCOPYRROLATE 0.2 MG/ML
0.2 INJECTION INTRAMUSCULAR; INTRAVENOUS
Status: DISCONTINUED | OUTPATIENT
Start: 2023-01-01 | End: 2023-08-13 | Stop reason: HOSPADM

## 2023-01-01 RX ORDER — ENOXAPARIN SODIUM 100 MG/ML
40 INJECTION SUBCUTANEOUS DAILY
Status: DISCONTINUED | OUTPATIENT
Start: 2023-01-01 | End: 2023-01-01

## 2023-01-01 RX ORDER — POLYETHYLENE GLYCOL 3350 17 G/17G
17 POWDER, FOR SOLUTION ORAL DAILY PRN
Status: DISCONTINUED | OUTPATIENT
Start: 2023-01-01 | End: 2023-01-01 | Stop reason: HOSPADM

## 2023-01-01 RX ORDER — SODIUM CHLORIDE 0.9 % (FLUSH) 0.9 %
5-40 SYRINGE (ML) INJECTION PRN
Status: DISCONTINUED | OUTPATIENT
Start: 2023-01-01 | End: 2023-08-13 | Stop reason: HOSPADM

## 2023-01-01 RX ORDER — IPRATROPIUM BROMIDE AND ALBUTEROL SULFATE 2.5; .5 MG/3ML; MG/3ML
1 SOLUTION RESPIRATORY (INHALATION)
Status: DISCONTINUED | OUTPATIENT
Start: 2023-01-01 | End: 2023-01-01

## 2023-01-01 RX ORDER — IPRATROPIUM BROMIDE AND ALBUTEROL SULFATE 2.5; .5 MG/3ML; MG/3ML
1 SOLUTION RESPIRATORY (INHALATION) EVERY 4 HOURS PRN
Status: DISCONTINUED | OUTPATIENT
Start: 2023-01-01 | End: 2023-01-01 | Stop reason: HOSPADM

## 2023-01-01 RX ORDER — LORAZEPAM 2 MG/ML
0.5 INJECTION INTRAMUSCULAR
Status: CANCELLED | OUTPATIENT
Start: 2023-01-01

## 2023-01-01 RX ORDER — ACETAMINOPHEN 650 MG/1
650 SUPPOSITORY RECTAL EVERY 4 HOURS PRN
Status: CANCELLED | OUTPATIENT
Start: 2023-01-01

## 2023-01-01 RX ORDER — DEXTROSE MONOHYDRATE 25 G/50ML
12.5 INJECTION, SOLUTION INTRAVENOUS PRN
Status: DISCONTINUED | OUTPATIENT
Start: 2023-01-01 | End: 2023-01-01 | Stop reason: HOSPADM

## 2023-01-01 RX ORDER — ONDANSETRON 2 MG/ML
4 INJECTION INTRAMUSCULAR; INTRAVENOUS EVERY 4 HOURS PRN
Status: DISCONTINUED | OUTPATIENT
Start: 2023-01-01 | End: 2023-08-13 | Stop reason: HOSPADM

## 2023-01-01 RX ORDER — ONDANSETRON 2 MG/ML
4 INJECTION INTRAMUSCULAR; INTRAVENOUS EVERY 6 HOURS PRN
Status: DISCONTINUED | OUTPATIENT
Start: 2023-01-01 | End: 2023-01-01 | Stop reason: HOSPADM

## 2023-01-01 RX ORDER — DEXTROSE MONOHYDRATE 100 MG/ML
INJECTION, SOLUTION INTRAVENOUS
Status: COMPLETED
Start: 2023-01-01 | End: 2023-01-01

## 2023-01-01 RX ORDER — ONDANSETRON 4 MG/1
4 TABLET, ORALLY DISINTEGRATING ORAL EVERY 8 HOURS PRN
Status: DISCONTINUED | OUTPATIENT
Start: 2023-01-01 | End: 2023-01-01 | Stop reason: HOSPADM

## 2023-01-01 RX ORDER — LABETALOL HYDROCHLORIDE 5 MG/ML
10 INJECTION, SOLUTION INTRAVENOUS EVERY 10 MIN PRN
Status: DISCONTINUED | OUTPATIENT
Start: 2023-01-01 | End: 2023-01-01 | Stop reason: HOSPADM

## 2023-01-01 RX ORDER — ASPIRIN 81 MG/1
81 TABLET, CHEWABLE ORAL DAILY
Status: DISCONTINUED | OUTPATIENT
Start: 2023-01-01 | End: 2023-01-01 | Stop reason: HOSPADM

## 2023-01-01 RX ORDER — MORPHINE SULFATE 2 MG/ML
2 INJECTION, SOLUTION INTRAMUSCULAR; INTRAVENOUS
Status: DISCONTINUED | OUTPATIENT
Start: 2023-01-01 | End: 2023-01-01

## 2023-01-01 RX ORDER — FENTANYL CITRATE-0.9 % NACL/PF 10 MCG/ML
25-200 PLASTIC BAG, INJECTION (ML) INTRAVENOUS CONTINUOUS
Status: DISCONTINUED | OUTPATIENT
Start: 2023-01-01 | End: 2023-01-01

## 2023-01-01 RX ORDER — EPINEPHRINE 1 MG/ML
0.3 INJECTION, SOLUTION, CONCENTRATE INTRAVENOUS PRN
Status: CANCELLED | OUTPATIENT
Start: 2023-01-01

## 2023-01-01 RX ORDER — SODIUM CHLORIDE 0.9 % (FLUSH) 0.9 %
5-40 SYRINGE (ML) INJECTION PRN
Status: DISCONTINUED | OUTPATIENT
Start: 2023-01-01 | End: 2023-01-01 | Stop reason: HOSPADM

## 2023-01-01 RX ORDER — ASPIRIN 300 MG/1
300 SUPPOSITORY RECTAL DAILY
Status: DISCONTINUED | OUTPATIENT
Start: 2023-01-01 | End: 2023-01-01 | Stop reason: HOSPADM

## 2023-01-01 RX ORDER — DIPHENHYDRAMINE HYDROCHLORIDE 50 MG/ML
50 INJECTION INTRAMUSCULAR; INTRAVENOUS ONCE
Status: CANCELLED | OUTPATIENT
Start: 2023-01-01 | End: 2023-01-01

## 2023-01-01 RX ORDER — DEXTROSE, SODIUM CHLORIDE, SODIUM LACTATE, POTASSIUM CHLORIDE, AND CALCIUM CHLORIDE 5; .6; .31; .03; .02 G/100ML; G/100ML; G/100ML; G/100ML; G/100ML
INJECTION, SOLUTION INTRAVENOUS CONTINUOUS
Status: DISCONTINUED | OUTPATIENT
Start: 2023-01-01 | End: 2023-01-01

## 2023-01-01 RX ORDER — SODIUM CHLORIDE, SODIUM LACTATE, POTASSIUM CHLORIDE, CALCIUM CHLORIDE 600; 310; 30; 20 MG/100ML; MG/100ML; MG/100ML; MG/100ML
INJECTION, SOLUTION INTRAVENOUS CONTINUOUS
Status: DISCONTINUED | OUTPATIENT
Start: 2023-01-01 | End: 2023-01-01

## 2023-01-01 RX ORDER — POTASSIUM CHLORIDE 7.45 MG/ML
10 INJECTION INTRAVENOUS
Status: ACTIVE | OUTPATIENT
Start: 2023-01-01 | End: 2023-01-01

## 2023-01-01 RX ORDER — PROPOFOL 10 MG/ML
5-50 INJECTION, EMULSION INTRAVENOUS CONTINUOUS
Status: DISCONTINUED | OUTPATIENT
Start: 2023-01-01 | End: 2023-01-01

## 2023-01-01 RX ORDER — MORPHINE SULFATE 2 MG/ML
2 INJECTION, SOLUTION INTRAMUSCULAR; INTRAVENOUS
Status: DISCONTINUED | OUTPATIENT
Start: 2023-01-01 | End: 2023-08-13 | Stop reason: HOSPADM

## 2023-01-01 RX ORDER — FAMOTIDINE 10 MG/ML
20 INJECTION, SOLUTION INTRAVENOUS 2 TIMES DAILY
Status: DISCONTINUED | OUTPATIENT
Start: 2023-01-01 | End: 2023-01-01

## 2023-01-01 RX ORDER — ASPIRIN 300 MG/1
300 SUPPOSITORY RECTAL DAILY
Status: DISCONTINUED | OUTPATIENT
Start: 2023-01-01 | End: 2023-01-01

## 2023-01-01 RX ORDER — HALOPERIDOL 5 MG/ML
5 INJECTION INTRAMUSCULAR ONCE
Status: COMPLETED | OUTPATIENT
Start: 2023-01-01 | End: 2023-01-01

## 2023-01-01 RX ORDER — FENTANYL CITRATE 50 UG/ML
50 INJECTION, SOLUTION INTRAMUSCULAR; INTRAVENOUS
Status: DISCONTINUED | OUTPATIENT
Start: 2023-01-01 | End: 2023-01-01

## 2023-01-01 RX ORDER — MAGNESIUM SULFATE IN WATER 40 MG/ML
2000 INJECTION, SOLUTION INTRAVENOUS PRN
Status: DISCONTINUED | OUTPATIENT
Start: 2023-01-01 | End: 2023-01-01 | Stop reason: HOSPADM

## 2023-01-01 RX ORDER — ROSUVASTATIN CALCIUM 20 MG/1
40 TABLET, COATED ORAL NIGHTLY
Status: DISCONTINUED | OUTPATIENT
Start: 2023-01-01 | End: 2023-01-01 | Stop reason: HOSPADM

## 2023-01-01 RX ORDER — DEXTROSE MONOHYDRATE 25 G/50ML
50 INJECTION, SOLUTION INTRAVENOUS ONCE
Status: DISCONTINUED | OUTPATIENT
Start: 2023-01-01 | End: 2023-01-01 | Stop reason: ALTCHOICE

## 2023-01-01 RX ORDER — LINEZOLID 2 MG/ML
600 INJECTION, SOLUTION INTRAVENOUS EVERY 12 HOURS
Status: DISCONTINUED | OUTPATIENT
Start: 2023-01-01 | End: 2023-01-01

## 2023-01-01 RX ORDER — POTASSIUM CHLORIDE 29.8 MG/ML
20 INJECTION INTRAVENOUS PRN
Status: DISCONTINUED | OUTPATIENT
Start: 2023-01-01 | End: 2023-01-01 | Stop reason: HOSPADM

## 2023-01-01 RX ORDER — NOREPINEPHRINE BITARTRATE 0.06 MG/ML
1-100 INJECTION, SOLUTION INTRAVENOUS CONTINUOUS
Status: DISCONTINUED | OUTPATIENT
Start: 2023-01-01 | End: 2023-01-01

## 2023-01-01 RX ORDER — DEXMEDETOMIDINE HYDROCHLORIDE 4 UG/ML
.1-1.5 INJECTION, SOLUTION INTRAVENOUS CONTINUOUS
Status: DISCONTINUED | OUTPATIENT
Start: 2023-01-01 | End: 2023-01-01

## 2023-01-01 RX ORDER — CHLORHEXIDINE GLUCONATE 0.12 MG/ML
15 RINSE ORAL 2 TIMES DAILY
Status: DISCONTINUED | OUTPATIENT
Start: 2023-01-01 | End: 2023-01-01

## 2023-01-01 RX ORDER — DIPHENHYDRAMINE HYDROCHLORIDE 50 MG/ML
50 INJECTION INTRAMUSCULAR; INTRAVENOUS
Status: CANCELLED | OUTPATIENT
Start: 2023-01-01

## 2023-01-01 RX ORDER — MIDAZOLAM HYDROCHLORIDE 2 MG/2ML
1 INJECTION, SOLUTION INTRAMUSCULAR; INTRAVENOUS
Status: DISCONTINUED | OUTPATIENT
Start: 2023-01-01 | End: 2023-01-01

## 2023-01-01 RX ORDER — DEXTROSE MONOHYDRATE 25 G/50ML
50 INJECTION, SOLUTION INTRAVENOUS ONCE
Status: COMPLETED | OUTPATIENT
Start: 2023-01-01 | End: 2023-01-01

## 2023-01-01 RX ORDER — DEXTROSE MONOHYDRATE, SODIUM CHLORIDE, AND POTASSIUM CHLORIDE 50; 2.98; 9 G/1000ML; G/1000ML; G/1000ML
INJECTION, SOLUTION INTRAVENOUS CONTINUOUS
Status: DISCONTINUED | OUTPATIENT
Start: 2023-01-01 | End: 2023-01-01

## 2023-01-01 RX ORDER — POTASSIUM CHLORIDE 7.45 MG/ML
10 INJECTION INTRAVENOUS PRN
Status: DISCONTINUED | OUTPATIENT
Start: 2023-01-01 | End: 2023-01-01

## 2023-01-01 RX ORDER — SODIUM CHLORIDE 9 MG/ML
INJECTION, SOLUTION INTRAVENOUS PRN
Status: DISCONTINUED | OUTPATIENT
Start: 2023-01-01 | End: 2023-08-13 | Stop reason: HOSPADM

## 2023-01-01 RX ORDER — LORAZEPAM 2 MG/ML
0.5 INJECTION INTRAMUSCULAR
Status: DISCONTINUED | OUTPATIENT
Start: 2023-01-01 | End: 2023-08-13 | Stop reason: HOSPADM

## 2023-01-01 RX ORDER — PANTOPRAZOLE SODIUM 40 MG/10ML
40 INJECTION, POWDER, LYOPHILIZED, FOR SOLUTION INTRAVENOUS 2 TIMES DAILY
Status: DISCONTINUED | OUTPATIENT
Start: 2023-01-01 | End: 2023-01-01 | Stop reason: HOSPADM

## 2023-01-01 RX ORDER — SODIUM CHLORIDE FOR INHALATION 3 %
4 VIAL, NEBULIZER (ML) INHALATION EVERY 4 HOURS
Status: DISPENSED | OUTPATIENT
Start: 2023-01-01 | End: 2023-01-01

## 2023-01-01 RX ORDER — HYDROXYZINE HYDROCHLORIDE 10 MG/1
10 TABLET, FILM COATED ORAL 3 TIMES DAILY PRN
Status: DISCONTINUED | OUTPATIENT
Start: 2023-01-01 | End: 2023-01-01 | Stop reason: HOSPADM

## 2023-01-01 RX ORDER — SODIUM CHLORIDE 9 MG/ML
INJECTION INTRAVENOUS
Status: COMPLETED
Start: 2023-01-01 | End: 2023-01-01

## 2023-01-01 RX ORDER — ACETAMINOPHEN 325 MG/1
650 TABLET ORAL EVERY 6 HOURS PRN
Status: DISCONTINUED | OUTPATIENT
Start: 2023-01-01 | End: 2023-01-01 | Stop reason: HOSPADM

## 2023-01-01 RX ORDER — MORPHINE SULFATE 4 MG/ML
4 INJECTION, SOLUTION INTRAMUSCULAR; INTRAVENOUS EVERY 6 HOURS SCHEDULED
Status: DISCONTINUED | OUTPATIENT
Start: 2023-01-01 | End: 2023-08-13 | Stop reason: HOSPADM

## 2023-01-01 RX ORDER — ETOMIDATE 2 MG/ML
INJECTION INTRAVENOUS
Status: COMPLETED | OUTPATIENT
Start: 2023-01-01 | End: 2023-01-01

## 2023-01-01 RX ORDER — ACETAMINOPHEN 500 MG
1000 TABLET ORAL
Status: COMPLETED | OUTPATIENT
Start: 2023-01-01 | End: 2023-01-01

## 2023-01-01 RX ORDER — MIDAZOLAM HYDROCHLORIDE 2 MG/2ML
5 INJECTION, SOLUTION INTRAMUSCULAR; INTRAVENOUS ONCE
Status: COMPLETED | OUTPATIENT
Start: 2023-01-01 | End: 2023-01-01

## 2023-01-01 RX ORDER — POTASSIUM CHLORIDE 20 MEQ/1
40 TABLET, EXTENDED RELEASE ORAL PRN
Status: DISCONTINUED | OUTPATIENT
Start: 2023-01-01 | End: 2023-01-01 | Stop reason: HOSPADM

## 2023-01-01 RX ORDER — ONDANSETRON 2 MG/ML
4 INJECTION INTRAMUSCULAR; INTRAVENOUS EVERY 4 HOURS PRN
Status: CANCELLED | OUTPATIENT
Start: 2023-01-01

## 2023-01-01 RX ORDER — METOPROLOL TARTRATE 5 MG/5ML
5 INJECTION INTRAVENOUS ONCE
Status: DISCONTINUED | OUTPATIENT
Start: 2023-01-01 | End: 2023-01-01

## 2023-01-01 RX ORDER — GLYCOPYRROLATE 0.2 MG/ML
0.2 INJECTION INTRAMUSCULAR; INTRAVENOUS
Status: CANCELLED | OUTPATIENT
Start: 2023-01-01

## 2023-01-01 RX ORDER — FENTANYL CITRATE 50 UG/ML
INJECTION, SOLUTION INTRAMUSCULAR; INTRAVENOUS
Status: COMPLETED
Start: 2023-01-01 | End: 2023-01-01

## 2023-01-01 RX ORDER — ASPIRIN 81 MG/1
81 TABLET, CHEWABLE ORAL DAILY
Status: DISCONTINUED | OUTPATIENT
Start: 2023-01-01 | End: 2023-01-01

## 2023-01-01 RX ADMIN — PIPERACILLIN AND TAZOBACTAM 3375 MG: 3; .375 INJECTION, POWDER, LYOPHILIZED, FOR SOLUTION INTRAVENOUS at 05:34

## 2023-01-01 RX ADMIN — MORPHINE SULFATE 4 MG: 4 INJECTION, SOLUTION INTRAMUSCULAR; INTRAVENOUS at 18:10

## 2023-01-01 RX ADMIN — FENTANYL CITRATE 50 MCG: 50 INJECTION, SOLUTION INTRAMUSCULAR; INTRAVENOUS at 23:08

## 2023-01-01 RX ADMIN — Medication 4 ML: at 08:28

## 2023-01-01 RX ADMIN — CHLORHEXIDINE GLUCONATE 0.12% ORAL RINSE 15 ML: 1.2 LIQUID ORAL at 09:13

## 2023-01-01 RX ADMIN — GLYCOPYRROLATE 0.2 MG: 0.2 INJECTION INTRAMUSCULAR; INTRAVENOUS at 23:48

## 2023-01-01 RX ADMIN — FAMOTIDINE 20 MG: 10 INJECTION, SOLUTION INTRAVENOUS at 20:20

## 2023-01-01 RX ADMIN — ACETAMINOPHEN 650 MG: 325 TABLET ORAL at 00:48

## 2023-01-01 RX ADMIN — ENOXAPARIN SODIUM 40 MG: 100 INJECTION SUBCUTANEOUS at 08:24

## 2023-01-01 RX ADMIN — SODIUM PHOSPHATE, MONOBASIC, MONOHYDRATE AND SODIUM PHOSPHATE, DIBASIC, ANHYDROUS 15 MMOL: 142; 276 INJECTION, SOLUTION INTRAVENOUS at 20:44

## 2023-01-01 RX ADMIN — ENOXAPARIN SODIUM 40 MG: 100 INJECTION SUBCUTANEOUS at 13:04

## 2023-01-01 RX ADMIN — LABETALOL HYDROCHLORIDE 10 MG: 5 INJECTION, SOLUTION INTRAVENOUS at 05:12

## 2023-01-01 RX ADMIN — FAMOTIDINE 20 MG: 10 INJECTION, SOLUTION INTRAVENOUS at 08:43

## 2023-01-01 RX ADMIN — DEXTROSE MONOHYDRATE 12.5 G: 25 INJECTION, SOLUTION INTRAVENOUS at 12:11

## 2023-01-01 RX ADMIN — PANTOPRAZOLE SODIUM 40 MG: 40 INJECTION, POWDER, FOR SOLUTION INTRAVENOUS at 09:50

## 2023-01-01 RX ADMIN — FAMOTIDINE 20 MG: 10 INJECTION, SOLUTION INTRAVENOUS at 20:53

## 2023-01-01 RX ADMIN — SODIUM CHLORIDE, POTASSIUM CHLORIDE, SODIUM LACTATE AND CALCIUM CHLORIDE 500 ML: 600; 310; 30; 20 INJECTION, SOLUTION INTRAVENOUS at 22:37

## 2023-01-01 RX ADMIN — PIPERACILLIN AND TAZOBACTAM 3375 MG: 3; .375 INJECTION, POWDER, LYOPHILIZED, FOR SOLUTION INTRAVENOUS at 05:50

## 2023-01-01 RX ADMIN — POTASSIUM BICARBONATE 40 MEQ: 782 TABLET, EFFERVESCENT ORAL at 06:14

## 2023-01-01 RX ADMIN — SODIUM CHLORIDE, POTASSIUM CHLORIDE, SODIUM LACTATE AND CALCIUM CHLORIDE 500 ML: 600; 310; 30; 20 INJECTION, SOLUTION INTRAVENOUS at 02:03

## 2023-01-01 RX ADMIN — Medication 25 MCG/MIN: at 23:07

## 2023-01-01 RX ADMIN — ROSUVASTATIN CALCIUM 40 MG: 20 TABLET, FILM COATED ORAL at 20:53

## 2023-01-01 RX ADMIN — FAMOTIDINE 20 MG: 10 INJECTION, SOLUTION INTRAVENOUS at 20:55

## 2023-01-01 RX ADMIN — MORPHINE SULFATE 4 MG: 4 INJECTION, SOLUTION INTRAMUSCULAR; INTRAVENOUS at 02:17

## 2023-01-01 RX ADMIN — ENOXAPARIN SODIUM 30 MG: 100 INJECTION SUBCUTANEOUS at 09:03

## 2023-01-01 RX ADMIN — LORAZEPAM 0.5 MG: 2 INJECTION INTRAMUSCULAR; INTRAVENOUS at 23:48

## 2023-01-01 RX ADMIN — SODIUM CHLORIDE, POTASSIUM CHLORIDE, SODIUM LACTATE AND CALCIUM CHLORIDE 500 ML: 600; 310; 30; 20 INJECTION, SOLUTION INTRAVENOUS at 18:32

## 2023-01-01 RX ADMIN — SODIUM CHLORIDE, PRESERVATIVE FREE 10 ML: 5 INJECTION INTRAVENOUS at 21:41

## 2023-01-01 RX ADMIN — DEXTROSE MONOHYDRATE 50 ML: 25 INJECTION, SOLUTION INTRAVENOUS at 09:35

## 2023-01-01 RX ADMIN — PANTOPRAZOLE SODIUM 40 MG: 40 INJECTION, POWDER, FOR SOLUTION INTRAVENOUS at 09:03

## 2023-01-01 RX ADMIN — DEXTROSE MONOHYDRATE 125 ML: 100 INJECTION, SOLUTION INTRAVENOUS at 00:41

## 2023-01-01 RX ADMIN — ENOXAPARIN SODIUM 30 MG: 100 INJECTION SUBCUTANEOUS at 08:45

## 2023-01-01 RX ADMIN — ACETAMINOPHEN 1000 MG: 500 TABLET ORAL at 22:14

## 2023-01-01 RX ADMIN — FAMOTIDINE 20 MG: 10 INJECTION, SOLUTION INTRAVENOUS at 08:25

## 2023-01-01 RX ADMIN — GLYCOPYRROLATE 0.2 MG: 0.2 INJECTION INTRAMUSCULAR; INTRAVENOUS at 02:17

## 2023-01-01 RX ADMIN — SODIUM CHLORIDE, SODIUM LACTATE, POTASSIUM CHLORIDE, CALCIUM CHLORIDE AND DEXTROSE MONOHYDRATE: 5; 600; 310; 30; 20 INJECTION, SOLUTION INTRAVENOUS at 18:21

## 2023-01-01 RX ADMIN — SODIUM PHOSPHATE, MONOBASIC, MONOHYDRATE AND SODIUM PHOSPHATE, DIBASIC, ANHYDROUS 15 MMOL: 142; 276 INJECTION, SOLUTION INTRAVENOUS at 10:20

## 2023-01-01 RX ADMIN — Medication 75 MCG/HR: at 00:50

## 2023-01-01 RX ADMIN — LINEZOLID 600 MG: 600 INJECTION, SOLUTION INTRAVENOUS at 10:26

## 2023-01-01 RX ADMIN — IPRATROPIUM BROMIDE AND ALBUTEROL SULFATE 1 DOSE: 2.5; .5 SOLUTION RESPIRATORY (INHALATION) at 15:48

## 2023-01-01 RX ADMIN — ACETAMINOPHEN 650 MG: 325 TABLET ORAL at 12:20

## 2023-01-01 RX ADMIN — PIPERACILLIN AND TAZOBACTAM 4500 MG: 4; .5 INJECTION, POWDER, FOR SOLUTION INTRAVENOUS at 21:46

## 2023-01-01 RX ADMIN — Medication 4 ML: at 15:43

## 2023-01-01 RX ADMIN — ACETAMINOPHEN 650 MG: 325 SUPPOSITORY RECTAL at 16:27

## 2023-01-01 RX ADMIN — ASPIRIN 300 MG: 300 SUPPOSITORY RECTAL at 00:45

## 2023-01-01 RX ADMIN — ENOXAPARIN SODIUM 30 MG: 100 INJECTION SUBCUTANEOUS at 08:38

## 2023-01-01 RX ADMIN — ACETAMINOPHEN 650 MG: 325 SUPPOSITORY RECTAL at 05:19

## 2023-01-01 RX ADMIN — LINEZOLID 600 MG: 600 INJECTION, SOLUTION INTRAVENOUS at 08:37

## 2023-01-01 RX ADMIN — Medication 4 ML: at 20:11

## 2023-01-01 RX ADMIN — Medication 4 ML: at 19:24

## 2023-01-01 RX ADMIN — ASPIRIN 81 MG: 81 TABLET, CHEWABLE ORAL at 08:25

## 2023-01-01 RX ADMIN — POTASSIUM CHLORIDE 20 MEQ: 29.8 INJECTION, SOLUTION INTRAVENOUS at 12:40

## 2023-01-01 RX ADMIN — POTASSIUM CHLORIDE 20 MEQ: 29.8 INJECTION, SOLUTION INTRAVENOUS at 00:25

## 2023-01-01 RX ADMIN — CEFTRIAXONE SODIUM 1000 MG: 1 INJECTION, POWDER, FOR SOLUTION INTRAMUSCULAR; INTRAVENOUS at 14:04

## 2023-01-01 RX ADMIN — POTASSIUM CHLORIDE, DEXTROSE MONOHYDRATE AND SODIUM CHLORIDE: 300; 5; 900 INJECTION, SOLUTION INTRAVENOUS at 17:28

## 2023-01-01 RX ADMIN — ACETAMINOPHEN 650 MG: 325 TABLET ORAL at 20:55

## 2023-01-01 RX ADMIN — DEXTROSE MONOHYDRATE 12.5 G: 25 INJECTION, SOLUTION INTRAVENOUS at 00:10

## 2023-01-01 RX ADMIN — PIPERACILLIN AND TAZOBACTAM 4500 MG: 4; .5 INJECTION, POWDER, FOR SOLUTION INTRAVENOUS at 23:55

## 2023-01-01 RX ADMIN — SODIUM CHLORIDE, PRESERVATIVE FREE 10 ML: 5 INJECTION INTRAVENOUS at 08:37

## 2023-01-01 RX ADMIN — IPRATROPIUM BROMIDE AND ALBUTEROL SULFATE 1 DOSE: 2.5; .5 SOLUTION RESPIRATORY (INHALATION) at 07:28

## 2023-01-01 RX ADMIN — CEFTRIAXONE SODIUM 1000 MG: 1 INJECTION, POWDER, FOR SOLUTION INTRAMUSCULAR; INTRAVENOUS at 16:08

## 2023-01-01 RX ADMIN — ACETAMINOPHEN 650 MG: 325 TABLET ORAL at 18:15

## 2023-01-01 RX ADMIN — IPRATROPIUM BROMIDE AND ALBUTEROL SULFATE 1 DOSE: 2.5; .5 SOLUTION RESPIRATORY (INHALATION) at 12:12

## 2023-01-01 RX ADMIN — IPRATROPIUM BROMIDE AND ALBUTEROL SULFATE 1 DOSE: 2.5; .5 SOLUTION RESPIRATORY (INHALATION) at 20:00

## 2023-01-01 RX ADMIN — HYDROXYZINE HYDROCHLORIDE 10 MG: 10 TABLET ORAL at 07:34

## 2023-01-01 RX ADMIN — LINEZOLID 600 MG: 600 INJECTION, SOLUTION INTRAVENOUS at 21:09

## 2023-01-01 RX ADMIN — FAMOTIDINE 20 MG: 10 INJECTION, SOLUTION INTRAVENOUS at 09:14

## 2023-01-01 RX ADMIN — IPRATROPIUM BROMIDE AND ALBUTEROL SULFATE 1 DOSE: 2.5; .5 SOLUTION RESPIRATORY (INHALATION) at 20:30

## 2023-01-01 RX ADMIN — NOREPINEPHRINE BITARTRATE 25 MCG/MIN: 0.06 INJECTION, SOLUTION INTRAVENOUS at 23:07

## 2023-01-01 RX ADMIN — Medication 4 ML: at 23:51

## 2023-01-01 RX ADMIN — LORAZEPAM 0.5 MG: 2 INJECTION INTRAMUSCULAR; INTRAVENOUS at 05:57

## 2023-01-01 RX ADMIN — Medication 100 MG: at 08:07

## 2023-01-01 RX ADMIN — POTASSIUM CHLORIDE, DEXTROSE MONOHYDRATE AND SODIUM CHLORIDE: 300; 5; 900 INJECTION, SOLUTION INTRAVENOUS at 06:22

## 2023-01-01 RX ADMIN — SODIUM CHLORIDE 10 ML: 9 INJECTION INTRAMUSCULAR; INTRAVENOUS; SUBCUTANEOUS at 08:37

## 2023-01-01 RX ADMIN — IPRATROPIUM BROMIDE AND ALBUTEROL SULFATE 1 DOSE: 2.5; .5 SOLUTION RESPIRATORY (INHALATION) at 08:11

## 2023-01-01 RX ADMIN — DEXMEDETOMIDINE HYDROCHLORIDE 0.4 MCG/KG/HR: 4 INJECTION, SOLUTION INTRAVENOUS at 23:37

## 2023-01-01 RX ADMIN — DEXTROSE MONOHYDRATE 125 ML: 100 INJECTION, SOLUTION INTRAVENOUS at 09:01

## 2023-01-01 RX ADMIN — CHLORHEXIDINE GLUCONATE 0.12% ORAL RINSE 15 ML: 1.2 LIQUID ORAL at 08:43

## 2023-01-01 RX ADMIN — ASPIRIN 81 MG: 81 TABLET, CHEWABLE ORAL at 08:24

## 2023-01-01 RX ADMIN — LINEZOLID 600 MG: 600 INJECTION, SOLUTION INTRAVENOUS at 20:44

## 2023-01-01 RX ADMIN — PANTOPRAZOLE SODIUM 40 MG: 40 INJECTION, POWDER, FOR SOLUTION INTRAVENOUS at 08:07

## 2023-01-01 RX ADMIN — Medication 4 ML: at 11:41

## 2023-01-01 RX ADMIN — PHENYLEPHRINE HYDROCHLORIDE 60 MCG/MIN: 10 INJECTION INTRAVENOUS at 18:40

## 2023-01-01 RX ADMIN — PIPERACILLIN AND TAZOBACTAM 3375 MG: 3; .375 INJECTION, POWDER, LYOPHILIZED, FOR SOLUTION INTRAVENOUS at 15:19

## 2023-01-01 RX ADMIN — IPRATROPIUM BROMIDE AND ALBUTEROL SULFATE 1 DOSE: 2.5; .5 SOLUTION RESPIRATORY (INHALATION) at 15:34

## 2023-01-01 RX ADMIN — SODIUM CHLORIDE, SODIUM LACTATE, POTASSIUM CHLORIDE, CALCIUM CHLORIDE AND DEXTROSE MONOHYDRATE: 5; 600; 310; 30; 20 INJECTION, SOLUTION INTRAVENOUS at 13:01

## 2023-01-01 RX ADMIN — PANTOPRAZOLE SODIUM 40 MG: 40 INJECTION, POWDER, FOR SOLUTION INTRAVENOUS at 09:52

## 2023-01-01 RX ADMIN — DEXTROSE MONOHYDRATE: 100 INJECTION, SOLUTION INTRAVENOUS at 23:47

## 2023-01-01 RX ADMIN — CHLORHEXIDINE GLUCONATE 0.12% ORAL RINSE 15 ML: 1.2 LIQUID ORAL at 20:53

## 2023-01-01 RX ADMIN — Medication 4 ML: at 03:19

## 2023-01-01 RX ADMIN — IPRATROPIUM BROMIDE AND ALBUTEROL SULFATE 1 DOSE: 2.5; .5 SOLUTION RESPIRATORY (INHALATION) at 20:08

## 2023-01-01 RX ADMIN — CHLORHEXIDINE GLUCONATE 0.12% ORAL RINSE 15 ML: 1.2 LIQUID ORAL at 08:25

## 2023-01-01 RX ADMIN — PIPERACILLIN AND TAZOBACTAM 4500 MG: 4; .5 INJECTION, POWDER, FOR SOLUTION INTRAVENOUS at 05:04

## 2023-01-01 RX ADMIN — SODIUM CHLORIDE, PRESERVATIVE FREE 10 ML: 5 INJECTION INTRAVENOUS at 20:56

## 2023-01-01 RX ADMIN — HALOPERIDOL LACTATE 5 MG: 5 INJECTION, SOLUTION INTRAMUSCULAR at 00:27

## 2023-01-01 RX ADMIN — DEXTROSE MONOHYDRATE 12.5 G: 25 INJECTION, SOLUTION INTRAVENOUS at 06:49

## 2023-01-01 RX ADMIN — MORPHINE SULFATE 4 MG: 4 INJECTION, SOLUTION INTRAMUSCULAR; INTRAVENOUS at 08:03

## 2023-01-01 RX ADMIN — LINEZOLID 600 MG: 600 INJECTION, SOLUTION INTRAVENOUS at 08:50

## 2023-01-01 RX ADMIN — VANCOMYCIN HYDROCHLORIDE 1250 MG: 1.25 INJECTION, POWDER, LYOPHILIZED, FOR SOLUTION INTRAVENOUS at 20:13

## 2023-01-01 RX ADMIN — ASPIRIN 300 MG: 300 SUPPOSITORY RECTAL at 09:03

## 2023-01-01 RX ADMIN — PHENYLEPHRINE HYDROCHLORIDE 50 MCG/MIN: 10 INJECTION INTRAVENOUS at 04:47

## 2023-01-01 RX ADMIN — POTASSIUM CHLORIDE 20 MEQ: 29.8 INJECTION, SOLUTION INTRAVENOUS at 22:49

## 2023-01-01 RX ADMIN — IPRATROPIUM BROMIDE AND ALBUTEROL SULFATE 1 DOSE: 2.5; .5 SOLUTION RESPIRATORY (INHALATION) at 08:16

## 2023-01-01 RX ADMIN — POTASSIUM CHLORIDE 20 MEQ: 29.8 INJECTION, SOLUTION INTRAVENOUS at 04:04

## 2023-01-01 RX ADMIN — IPRATROPIUM BROMIDE AND ALBUTEROL SULFATE 1 DOSE: 2.5; .5 SOLUTION RESPIRATORY (INHALATION) at 19:24

## 2023-01-01 RX ADMIN — DEXTROSE MONOHYDRATE: 100 INJECTION, SOLUTION INTRAVENOUS at 09:57

## 2023-01-01 RX ADMIN — IPRATROPIUM BROMIDE AND ALBUTEROL SULFATE 1 DOSE: 2.5; .5 SOLUTION RESPIRATORY (INHALATION) at 08:15

## 2023-01-01 RX ADMIN — IPRATROPIUM BROMIDE AND ALBUTEROL SULFATE 1 DOSE: 2.5; .5 SOLUTION RESPIRATORY (INHALATION) at 11:39

## 2023-01-01 RX ADMIN — PANTOPRAZOLE SODIUM 40 MG: 40 INJECTION, POWDER, FOR SOLUTION INTRAVENOUS at 20:37

## 2023-01-01 RX ADMIN — ASPIRIN 81 MG: 81 TABLET, CHEWABLE ORAL at 07:34

## 2023-01-01 RX ADMIN — PIPERACILLIN AND TAZOBACTAM 4500 MG: 4; .5 INJECTION, POWDER, FOR SOLUTION INTRAVENOUS at 14:15

## 2023-01-01 RX ADMIN — Medication 4 ML: at 20:01

## 2023-01-01 RX ADMIN — PIPERACILLIN AND TAZOBACTAM 3375 MG: 3; .375 INJECTION, POWDER, LYOPHILIZED, FOR SOLUTION INTRAVENOUS at 05:04

## 2023-01-01 RX ADMIN — IPRATROPIUM BROMIDE AND ALBUTEROL SULFATE 1 DOSE: 2.5; .5 SOLUTION RESPIRATORY (INHALATION) at 15:41

## 2023-01-01 RX ADMIN — IPRATROPIUM BROMIDE AND ALBUTEROL SULFATE 1 DOSE: 2.5; .5 SOLUTION RESPIRATORY (INHALATION) at 11:26

## 2023-01-01 RX ADMIN — ACETAMINOPHEN 650 MG: 325 TABLET ORAL at 00:44

## 2023-01-01 RX ADMIN — ENOXAPARIN SODIUM 40 MG: 100 INJECTION SUBCUTANEOUS at 09:13

## 2023-01-01 RX ADMIN — LORAZEPAM 0.5 MG: 2 INJECTION INTRAMUSCULAR; INTRAVENOUS at 08:02

## 2023-01-01 RX ADMIN — ENOXAPARIN SODIUM 30 MG: 100 INJECTION SUBCUTANEOUS at 09:50

## 2023-01-01 RX ADMIN — POTASSIUM CHLORIDE 20 MEQ: 29.8 INJECTION, SOLUTION INTRAVENOUS at 08:00

## 2023-01-01 RX ADMIN — IPRATROPIUM BROMIDE AND ALBUTEROL SULFATE 1 DOSE: 2.5; .5 SOLUTION RESPIRATORY (INHALATION) at 12:47

## 2023-01-01 RX ADMIN — Medication 50 MCG/HR: at 23:14

## 2023-01-01 RX ADMIN — PHENYLEPHRINE HYDROCHLORIDE 70 MCG/MIN: 10 INJECTION INTRAVENOUS at 02:47

## 2023-01-01 RX ADMIN — GLYCOPYRROLATE 0.2 MG: 0.2 INJECTION INTRAMUSCULAR; INTRAVENOUS at 18:10

## 2023-01-01 RX ADMIN — POTASSIUM BICARBONATE 40 MEQ: 782 TABLET, EFFERVESCENT ORAL at 02:55

## 2023-01-01 RX ADMIN — Medication 4 ML: at 11:20

## 2023-01-01 RX ADMIN — FENTANYL CITRATE 50 MCG: 50 INJECTION, SOLUTION INTRAMUSCULAR; INTRAVENOUS at 01:22

## 2023-01-01 RX ADMIN — PANTOPRAZOLE SODIUM 40 MG: 40 INJECTION, POWDER, FOR SOLUTION INTRAVENOUS at 08:25

## 2023-01-01 RX ADMIN — PIPERACILLIN AND TAZOBACTAM 4500 MG: 4; .5 INJECTION, POWDER, FOR SOLUTION INTRAVENOUS at 21:44

## 2023-01-01 RX ADMIN — DEXTROSE MONOHYDRATE 12.5 G: 25 INJECTION, SOLUTION INTRAVENOUS at 23:50

## 2023-01-01 RX ADMIN — SODIUM PHOSPHATE, MONOBASIC, MONOHYDRATE AND SODIUM PHOSPHATE, DIBASIC, ANHYDROUS 15 MMOL: 142; 276 INJECTION, SOLUTION INTRAVENOUS at 23:56

## 2023-01-01 RX ADMIN — IOPAMIDOL 50 ML: 755 INJECTION, SOLUTION INTRAVENOUS at 19:28

## 2023-01-01 RX ADMIN — IPRATROPIUM BROMIDE AND ALBUTEROL SULFATE 1 DOSE: 2.5; .5 SOLUTION RESPIRATORY (INHALATION) at 11:19

## 2023-01-01 RX ADMIN — IPRATROPIUM BROMIDE AND ALBUTEROL SULFATE 1 DOSE: 2.5; .5 SOLUTION RESPIRATORY (INHALATION) at 08:25

## 2023-01-01 RX ADMIN — DEXMEDETOMIDINE HYDROCHLORIDE 0.6 MCG/KG/HR: 4 INJECTION, SOLUTION INTRAVENOUS at 04:35

## 2023-01-01 RX ADMIN — ENOXAPARIN SODIUM 30 MG: 100 INJECTION SUBCUTANEOUS at 07:33

## 2023-01-01 RX ADMIN — POTASSIUM CHLORIDE 20 MEQ: 29.8 INJECTION, SOLUTION INTRAVENOUS at 05:39

## 2023-01-01 RX ADMIN — MAGNESIUM SULFATE HEPTAHYDRATE 2000 MG: 40 INJECTION, SOLUTION INTRAVENOUS at 09:25

## 2023-01-01 RX ADMIN — ACETAMINOPHEN 650 MG: 325 SUPPOSITORY RECTAL at 18:18

## 2023-01-01 RX ADMIN — CHLORHEXIDINE GLUCONATE 0.12% ORAL RINSE 15 ML: 1.2 LIQUID ORAL at 20:55

## 2023-01-01 RX ADMIN — IPRATROPIUM BROMIDE AND ALBUTEROL SULFATE 1 DOSE: 2.5; .5 SOLUTION RESPIRATORY (INHALATION) at 15:56

## 2023-01-01 RX ADMIN — PANTOPRAZOLE SODIUM 40 MG: 40 INJECTION, POWDER, FOR SOLUTION INTRAVENOUS at 07:33

## 2023-01-01 RX ADMIN — DEXTROSE MONOHYDRATE 125 ML: 100 INJECTION, SOLUTION INTRAVENOUS at 12:31

## 2023-01-01 RX ADMIN — IPRATROPIUM BROMIDE AND ALBUTEROL SULFATE 1 DOSE: 2.5; .5 SOLUTION RESPIRATORY (INHALATION) at 15:35

## 2023-01-01 RX ADMIN — FENTANYL CITRATE 50 MCG: 50 INJECTION, SOLUTION INTRAMUSCULAR; INTRAVENOUS at 23:19

## 2023-01-01 RX ADMIN — ENOXAPARIN SODIUM 30 MG: 100 INJECTION SUBCUTANEOUS at 09:54

## 2023-01-01 RX ADMIN — FAMOTIDINE 20 MG: 10 INJECTION, SOLUTION INTRAVENOUS at 23:56

## 2023-01-01 RX ADMIN — PANTOPRAZOLE SODIUM 40 MG: 40 INJECTION, POWDER, FOR SOLUTION INTRAVENOUS at 19:52

## 2023-01-01 RX ADMIN — IPRATROPIUM BROMIDE AND ALBUTEROL SULFATE 1 DOSE: 2.5; .5 SOLUTION RESPIRATORY (INHALATION) at 16:13

## 2023-01-01 RX ADMIN — CEFEPIME 2000 MG: 2 INJECTION, POWDER, FOR SOLUTION INTRAVENOUS at 20:25

## 2023-01-01 RX ADMIN — ASPIRIN 300 MG: 300 SUPPOSITORY RECTAL at 09:53

## 2023-01-01 RX ADMIN — DEXTROSE MONOHYDRATE 12.5 G: 25 INJECTION, SOLUTION INTRAVENOUS at 05:47

## 2023-01-01 RX ADMIN — ENOXAPARIN SODIUM 40 MG: 100 INJECTION SUBCUTANEOUS at 08:33

## 2023-01-01 RX ADMIN — GLYCOPYRROLATE 0.2 MG: 0.2 INJECTION INTRAMUSCULAR; INTRAVENOUS at 05:57

## 2023-01-01 RX ADMIN — LABETALOL HYDROCHLORIDE 5 MG: 5 INJECTION, SOLUTION INTRAVENOUS at 04:37

## 2023-01-01 RX ADMIN — PIPERACILLIN AND TAZOBACTAM 4500 MG: 4; .5 INJECTION, POWDER, FOR SOLUTION INTRAVENOUS at 14:38

## 2023-01-01 RX ADMIN — SODIUM CHLORIDE, POTASSIUM CHLORIDE, SODIUM LACTATE AND CALCIUM CHLORIDE: 600; 310; 30; 20 INJECTION, SOLUTION INTRAVENOUS at 15:33

## 2023-01-01 RX ADMIN — PROPOFOL 20 MCG/KG/MIN: 10 INJECTION, EMULSION INTRAVENOUS at 22:40

## 2023-01-01 RX ADMIN — SODIUM CHLORIDE, POTASSIUM CHLORIDE, SODIUM LACTATE AND CALCIUM CHLORIDE 500 ML: 600; 310; 30; 20 INJECTION, SOLUTION INTRAVENOUS at 01:13

## 2023-01-01 RX ADMIN — MORPHINE SULFATE 4 MG: 4 INJECTION, SOLUTION INTRAMUSCULAR; INTRAVENOUS at 12:02

## 2023-01-01 RX ADMIN — ACETAMINOPHEN 650 MG: 325 TABLET ORAL at 07:41

## 2023-01-01 RX ADMIN — ACETAMINOPHEN 650 MG: 325 TABLET ORAL at 08:06

## 2023-01-01 RX ADMIN — Medication 4 ML: at 15:36

## 2023-01-01 RX ADMIN — DEXMEDETOMIDINE HYDROCHLORIDE 0.2 MCG/KG/HR: 4 INJECTION, SOLUTION INTRAVENOUS at 03:09

## 2023-01-01 RX ADMIN — PHENYLEPHRINE HYDROCHLORIDE 30 MCG/MIN: 10 INJECTION INTRAVENOUS at 01:49

## 2023-01-01 RX ADMIN — IPRATROPIUM BROMIDE AND ALBUTEROL SULFATE 1 DOSE: 2.5; .5 SOLUTION RESPIRATORY (INHALATION) at 07:10

## 2023-01-01 RX ADMIN — POTASSIUM CHLORIDE 10 MEQ: 7.46 INJECTION, SOLUTION INTRAVENOUS at 11:37

## 2023-01-01 RX ADMIN — MAGNESIUM SULFATE HEPTAHYDRATE 2000 MG: 40 INJECTION, SOLUTION INTRAVENOUS at 03:59

## 2023-01-01 RX ADMIN — IOPAMIDOL 75 ML: 755 INJECTION, SOLUTION INTRAVENOUS at 23:52

## 2023-01-01 RX ADMIN — POTASSIUM BICARBONATE 40 MEQ: 782 TABLET, EFFERVESCENT ORAL at 09:13

## 2023-01-01 RX ADMIN — SODIUM PHOSPHATE, MONOBASIC, MONOHYDRATE AND SODIUM PHOSPHATE, DIBASIC, ANHYDROUS 20 MMOL: 142; 276 INJECTION, SOLUTION INTRAVENOUS at 04:43

## 2023-01-01 RX ADMIN — CHLORHEXIDINE GLUCONATE 0.12% ORAL RINSE 15 ML: 1.2 LIQUID ORAL at 20:20

## 2023-01-01 RX ADMIN — FENTANYL CITRATE 50 MCG: 50 INJECTION, SOLUTION INTRAMUSCULAR; INTRAVENOUS at 01:38

## 2023-01-01 RX ADMIN — SODIUM CHLORIDE, POTASSIUM CHLORIDE, SODIUM LACTATE AND CALCIUM CHLORIDE: 600; 310; 30; 20 INJECTION, SOLUTION INTRAVENOUS at 10:37

## 2023-01-01 RX ADMIN — ASPIRIN 300 MG: 300 SUPPOSITORY RECTAL at 08:45

## 2023-01-01 RX ADMIN — MORPHINE SULFATE 4 MG: 4 INJECTION, SOLUTION INTRAMUSCULAR; INTRAVENOUS at 20:56

## 2023-01-01 RX ADMIN — Medication 4 ML: at 23:32

## 2023-01-01 RX ADMIN — DEXTROSE MONOHYDRATE: 100 INJECTION, SOLUTION INTRAVENOUS at 18:46

## 2023-01-01 RX ADMIN — ASPIRIN 81 MG: 81 TABLET, CHEWABLE ORAL at 08:07

## 2023-01-01 RX ADMIN — MORPHINE SULFATE 4 MG: 4 INJECTION, SOLUTION INTRAMUSCULAR; INTRAVENOUS at 23:48

## 2023-01-01 RX ADMIN — IPRATROPIUM BROMIDE AND ALBUTEROL SULFATE 1 DOSE: 2.5; .5 SOLUTION RESPIRATORY (INHALATION) at 11:40

## 2023-01-01 RX ADMIN — POTASSIUM CHLORIDE 20 MEQ: 29.8 INJECTION, SOLUTION INTRAVENOUS at 07:28

## 2023-01-01 RX ADMIN — IPRATROPIUM BROMIDE AND ALBUTEROL SULFATE 1 DOSE: 2.5; .5 SOLUTION RESPIRATORY (INHALATION) at 20:27

## 2023-01-01 RX ADMIN — Medication 75 MCG/HR: at 10:30

## 2023-01-01 RX ADMIN — Medication 4 ML: at 15:41

## 2023-01-01 RX ADMIN — SODIUM CHLORIDE, POTASSIUM CHLORIDE, SODIUM LACTATE AND CALCIUM CHLORIDE: 600; 310; 30; 20 INJECTION, SOLUTION INTRAVENOUS at 11:42

## 2023-01-01 RX ADMIN — Medication 4 ML: at 03:57

## 2023-01-01 RX ADMIN — PANTOPRAZOLE SODIUM 40 MG: 40 INJECTION, POWDER, FOR SOLUTION INTRAVENOUS at 08:37

## 2023-01-01 RX ADMIN — SODIUM CHLORIDE, SODIUM LACTATE, POTASSIUM CHLORIDE, CALCIUM CHLORIDE AND DEXTROSE MONOHYDRATE: 5; 600; 310; 30; 20 INJECTION, SOLUTION INTRAVENOUS at 12:11

## 2023-01-01 RX ADMIN — ROSUVASTATIN CALCIUM 40 MG: 20 TABLET, FILM COATED ORAL at 20:21

## 2023-01-01 RX ADMIN — Medication 4 ML: at 08:01

## 2023-01-01 RX ADMIN — GLYCOPYRROLATE 0.2 MG: 0.2 INJECTION INTRAMUSCULAR; INTRAVENOUS at 20:31

## 2023-01-01 RX ADMIN — IPRATROPIUM BROMIDE AND ALBUTEROL SULFATE 1 DOSE: 2.5; .5 SOLUTION RESPIRATORY (INHALATION) at 11:28

## 2023-01-01 RX ADMIN — ROSUVASTATIN CALCIUM 40 MG: 20 TABLET, FILM COATED ORAL at 20:55

## 2023-01-01 RX ADMIN — SODIUM CHLORIDE, SODIUM LACTATE, POTASSIUM CHLORIDE, CALCIUM CHLORIDE AND DEXTROSE MONOHYDRATE: 5; 600; 310; 30; 20 INJECTION, SOLUTION INTRAVENOUS at 01:47

## 2023-01-01 RX ADMIN — DEXTROSE MONOHYDRATE 12.5 G: 25 INJECTION, SOLUTION INTRAVENOUS at 02:24

## 2023-01-01 RX ADMIN — DEXTROSE MONOHYDRATE 50 G: 25 INJECTION, SOLUTION INTRAVENOUS at 09:51

## 2023-01-01 RX ADMIN — PANTOPRAZOLE SODIUM 40 MG: 40 INJECTION, POWDER, FOR SOLUTION INTRAVENOUS at 08:44

## 2023-01-01 RX ADMIN — LABETALOL HYDROCHLORIDE 10 MG: 5 INJECTION, SOLUTION INTRAVENOUS at 05:09

## 2023-01-01 RX ADMIN — SODIUM CHLORIDE 25 ML: 9 INJECTION, SOLUTION INTRAVENOUS at 14:38

## 2023-01-01 RX ADMIN — IPRATROPIUM BROMIDE AND ALBUTEROL SULFATE 1 DOSE: 2.5; .5 SOLUTION RESPIRATORY (INHALATION) at 20:10

## 2023-01-01 RX ADMIN — PIPERACILLIN AND TAZOBACTAM 3375 MG: 3; .375 INJECTION, POWDER, LYOPHILIZED, FOR SOLUTION INTRAVENOUS at 22:00

## 2023-01-01 RX ADMIN — IPRATROPIUM BROMIDE AND ALBUTEROL SULFATE 1 DOSE: 2.5; .5 SOLUTION RESPIRATORY (INHALATION) at 15:42

## 2023-01-01 RX ADMIN — POTASSIUM CHLORIDE 20 MEQ: 29.8 INJECTION, SOLUTION INTRAVENOUS at 09:14

## 2023-01-01 RX ADMIN — MAGNESIUM SULFATE HEPTAHYDRATE 2000 MG: 40 INJECTION, SOLUTION INTRAVENOUS at 13:07

## 2023-01-01 RX ADMIN — SODIUM PHOSPHATE, MONOBASIC, MONOHYDRATE AND SODIUM PHOSPHATE, DIBASIC, ANHYDROUS 20 MMOL: 142; 276 INJECTION, SOLUTION INTRAVENOUS at 07:36

## 2023-01-01 RX ADMIN — Medication 4 ML: at 08:15

## 2023-01-01 RX ADMIN — POTASSIUM BICARBONATE 40 MEQ: 782 TABLET, EFFERVESCENT ORAL at 06:55

## 2023-01-01 RX ADMIN — SODIUM CHLORIDE, SODIUM LACTATE, POTASSIUM CHLORIDE, CALCIUM CHLORIDE AND DEXTROSE MONOHYDRATE: 5; 600; 310; 30; 20 INJECTION, SOLUTION INTRAVENOUS at 02:45

## 2023-01-01 RX ADMIN — ETOMIDATE 16 MG: 2 INJECTION, SOLUTION INTRAVENOUS at 22:19

## 2023-01-01 RX ADMIN — Medication 4 ML: at 03:30

## 2023-01-01 RX ADMIN — ASPIRIN 81 MG: 81 TABLET, CHEWABLE ORAL at 08:43

## 2023-01-01 RX ADMIN — MORPHINE SULFATE 2 MG: 2 INJECTION, SOLUTION INTRAMUSCULAR; INTRAVENOUS at 20:31

## 2023-01-01 RX ADMIN — MORPHINE SULFATE 4 MG: 4 INJECTION, SOLUTION INTRAMUSCULAR; INTRAVENOUS at 18:00

## 2023-01-01 RX ADMIN — FENTANYL CITRATE 50 MCG: 50 INJECTION, SOLUTION INTRAMUSCULAR; INTRAVENOUS at 20:44

## 2023-01-01 RX ADMIN — POTASSIUM CHLORIDE 20 MEQ: 29.8 INJECTION, SOLUTION INTRAVENOUS at 06:52

## 2023-01-01 RX ADMIN — IPRATROPIUM BROMIDE AND ALBUTEROL SULFATE 1 DOSE: 2.5; .5 SOLUTION RESPIRATORY (INHALATION) at 15:45

## 2023-01-01 RX ADMIN — SODIUM CHLORIDE, SODIUM LACTATE, POTASSIUM CHLORIDE, CALCIUM CHLORIDE AND DEXTROSE MONOHYDRATE: 5; 600; 310; 30; 20 INJECTION, SOLUTION INTRAVENOUS at 05:27

## 2023-01-01 RX ADMIN — POTASSIUM CHLORIDE 20 MEQ: 29.8 INJECTION, SOLUTION INTRAVENOUS at 06:32

## 2023-01-01 RX ADMIN — PHENYLEPHRINE HYDROCHLORIDE 70 MCG/MIN: 10 INJECTION INTRAVENOUS at 14:03

## 2023-01-01 RX ADMIN — DEXTROSE MONOHYDRATE 12.5 G: 25 INJECTION, SOLUTION INTRAVENOUS at 12:26

## 2023-01-01 RX ADMIN — DEXTROSE MONOHYDRATE: 100 INJECTION, SOLUTION INTRAVENOUS at 10:45

## 2023-01-01 RX ADMIN — ASPIRIN 300 MG: 300 SUPPOSITORY RECTAL at 09:49

## 2023-01-01 RX ADMIN — POTASSIUM CHLORIDE 20 MEQ: 29.8 INJECTION, SOLUTION INTRAVENOUS at 16:19

## 2023-01-01 RX ADMIN — MORPHINE SULFATE 4 MG: 4 INJECTION, SOLUTION INTRAMUSCULAR; INTRAVENOUS at 05:57

## 2023-01-01 RX ADMIN — IPRATROPIUM BROMIDE AND ALBUTEROL SULFATE 1 DOSE: 2.5; .5 SOLUTION RESPIRATORY (INHALATION) at 15:33

## 2023-01-01 RX ADMIN — DEXTROSE MONOHYDRATE 125 ML: 100 INJECTION, SOLUTION INTRAVENOUS at 14:19

## 2023-01-01 RX ADMIN — DEXTROSE MONOHYDRATE 125 ML: 100 INJECTION, SOLUTION INTRAVENOUS at 22:12

## 2023-01-01 RX ADMIN — Medication 100 MG: at 07:34

## 2023-01-01 RX ADMIN — POTASSIUM CHLORIDE 20 MEQ: 29.8 INJECTION, SOLUTION INTRAVENOUS at 17:04

## 2023-01-01 RX ADMIN — MIDAZOLAM 5 MG: 1 INJECTION INTRAMUSCULAR; INTRAVENOUS at 00:09

## 2023-01-01 RX ADMIN — ROSUVASTATIN CALCIUM 40 MG: 20 TABLET, FILM COATED ORAL at 03:45

## 2023-01-01 RX ADMIN — IPRATROPIUM BROMIDE AND ALBUTEROL SULFATE 1 DOSE: 2.5; .5 SOLUTION RESPIRATORY (INHALATION) at 19:56

## 2023-01-01 RX ADMIN — PHENYLEPHRINE HYDROCHLORIDE 40 MCG/MIN: 10 INJECTION INTRAVENOUS at 12:20

## 2023-01-01 RX ADMIN — PANTOPRAZOLE SODIUM 40 MG: 40 INJECTION, POWDER, FOR SOLUTION INTRAVENOUS at 21:41

## 2023-01-01 RX ADMIN — SODIUM CHLORIDE, PRESERVATIVE FREE 10 ML: 5 INJECTION INTRAVENOUS at 21:22

## 2023-01-01 RX ADMIN — SODIUM PHOSPHATE, MONOBASIC, MONOHYDRATE AND SODIUM PHOSPHATE, DIBASIC, ANHYDROUS 10 MMOL: 142; 276 INJECTION, SOLUTION INTRAVENOUS at 14:42

## 2023-01-01 RX ADMIN — ACETAMINOPHEN 650 MG: 325 SUPPOSITORY RECTAL at 13:21

## 2023-01-01 RX ADMIN — ASPIRIN 81 MG: 81 TABLET, CHEWABLE ORAL at 09:13

## 2023-01-01 RX ADMIN — IPRATROPIUM BROMIDE AND ALBUTEROL SULFATE 1 DOSE: 2.5; .5 SOLUTION RESPIRATORY (INHALATION) at 08:01

## 2023-01-01 RX ADMIN — PANTOPRAZOLE SODIUM 40 MG: 40 INJECTION, POWDER, FOR SOLUTION INTRAVENOUS at 03:44

## 2023-01-01 RX ADMIN — SODIUM CHLORIDE, SODIUM LACTATE, POTASSIUM CHLORIDE, CALCIUM CHLORIDE AND DEXTROSE MONOHYDRATE: 5; 600; 310; 30; 20 INJECTION, SOLUTION INTRAVENOUS at 15:31

## 2023-01-01 RX ADMIN — POTASSIUM CHLORIDE 10 MEQ: 7.46 INJECTION, SOLUTION INTRAVENOUS at 11:35

## 2023-01-01 RX ADMIN — CEFTRIAXONE SODIUM 1000 MG: 1 INJECTION, POWDER, FOR SOLUTION INTRAMUSCULAR; INTRAVENOUS at 14:38

## 2023-01-01 RX ADMIN — DEXTROSE MONOHYDRATE: 100 INJECTION, SOLUTION INTRAVENOUS at 23:54

## 2023-01-01 RX ADMIN — SODIUM CHLORIDE, PRESERVATIVE FREE 10 ML: 5 INJECTION INTRAVENOUS at 08:45

## 2023-01-01 RX ADMIN — IPRATROPIUM BROMIDE AND ALBUTEROL SULFATE 1 DOSE: 2.5; .5 SOLUTION RESPIRATORY (INHALATION) at 19:28

## 2023-01-01 RX ADMIN — PIPERACILLIN AND TAZOBACTAM 3375 MG: 3; .375 INJECTION, POWDER, LYOPHILIZED, FOR SOLUTION INTRAVENOUS at 22:01

## 2023-01-01 RX ADMIN — Medication 4 ML: at 23:30

## 2023-01-01 RX ADMIN — PIPERACILLIN AND TAZOBACTAM 3375 MG: 3; .375 INJECTION, POWDER, LYOPHILIZED, FOR SOLUTION INTRAVENOUS at 17:26

## 2023-01-01 RX ADMIN — DEXMEDETOMIDINE HYDROCHLORIDE 0.4 MCG/KG/HR: 4 INJECTION, SOLUTION INTRAVENOUS at 19:32

## 2023-01-01 RX ADMIN — PROPOFOL 15 MCG/KG/MIN: 10 INJECTION, EMULSION INTRAVENOUS at 09:19

## 2023-01-01 RX ADMIN — ENOXAPARIN SODIUM 30 MG: 100 INJECTION SUBCUTANEOUS at 08:07

## 2023-01-01 RX ADMIN — SODIUM CHLORIDE, PRESERVATIVE FREE 10 ML: 5 INJECTION INTRAVENOUS at 09:13

## 2023-01-01 RX ADMIN — IPRATROPIUM BROMIDE AND ALBUTEROL SULFATE 1 DOSE: 2.5; .5 SOLUTION RESPIRATORY (INHALATION) at 12:00

## 2023-01-01 RX ADMIN — ASPIRIN 300 MG: 300 SUPPOSITORY RECTAL at 08:33

## 2023-01-01 RX ADMIN — POTASSIUM CHLORIDE 20 MEQ: 29.8 INJECTION, SOLUTION INTRAVENOUS at 07:36

## 2023-01-01 RX ADMIN — FENTANYL CITRATE 50 MCG: 50 INJECTION, SOLUTION INTRAMUSCULAR; INTRAVENOUS at 14:46

## 2023-01-01 RX ADMIN — PANTOPRAZOLE SODIUM 40 MG: 40 INJECTION, POWDER, FOR SOLUTION INTRAVENOUS at 20:38

## 2023-01-01 RX ADMIN — DEXTROSE MONOHYDRATE 12.5 G: 25 INJECTION, SOLUTION INTRAVENOUS at 22:56

## 2023-01-01 RX ADMIN — ROSUVASTATIN CALCIUM 40 MG: 20 TABLET, FILM COATED ORAL at 20:38

## 2023-01-01 RX ADMIN — POTASSIUM CHLORIDE 20 MEQ: 29.8 INJECTION, SOLUTION INTRAVENOUS at 11:59

## 2023-01-01 RX ADMIN — PANTOPRAZOLE SODIUM 40 MG: 40 INJECTION, POWDER, FOR SOLUTION INTRAVENOUS at 21:22

## 2023-01-01 RX ADMIN — PIPERACILLIN AND TAZOBACTAM 4500 MG: 4; .5 INJECTION, POWDER, FOR SOLUTION INTRAVENOUS at 06:49

## 2023-01-01 RX ADMIN — POTASSIUM BICARBONATE 40 MEQ: 782 TABLET, EFFERVESCENT ORAL at 08:26

## 2023-01-01 RX ADMIN — MAGNESIUM SULFATE HEPTAHYDRATE 2000 MG: 40 INJECTION, SOLUTION INTRAVENOUS at 10:42

## 2023-01-01 RX ADMIN — CHLORHEXIDINE GLUCONATE 0.12% ORAL RINSE 15 ML: 1.2 LIQUID ORAL at 23:56

## 2023-01-01 RX ADMIN — LORAZEPAM 0.5 MG: 2 INJECTION INTRAMUSCULAR; INTRAVENOUS at 20:56

## 2023-01-01 RX ADMIN — DEXTROSE MONOHYDRATE 125 ML: 100 INJECTION, SOLUTION INTRAVENOUS at 22:27

## 2023-01-01 ASSESSMENT — PULMONARY FUNCTION TESTS
PIF_VALUE: 15
PIF_VALUE: 18
PIF_VALUE: 25
PIF_VALUE: 20
PIF_VALUE: 17
PIF_VALUE: 15
PIF_VALUE: 20
PIF_VALUE: 24
PIF_VALUE: 25
PIF_VALUE: 16
PIF_VALUE: 16
PIF_VALUE: 26
PIF_VALUE: 16
PIF_VALUE: 13
PIF_VALUE: 15
PIF_VALUE: 14
PIF_VALUE: 18
PIF_VALUE: 17
PIF_VALUE: 16
PIF_VALUE: 18
PIF_VALUE: 22
PIF_VALUE: 18
PIF_VALUE: 13
PIF_VALUE: 26
PIF_VALUE: 18
PIF_VALUE: 19
PIF_VALUE: 20
PIF_VALUE: 14
PIF_VALUE: 16
PIF_VALUE: 20
PIF_VALUE: 18
PIF_VALUE: 19
PIF_VALUE: 18
PIF_VALUE: 20
PIF_VALUE: 16
PIF_VALUE: 22
PIF_VALUE: 26
PIF_VALUE: 24
PIF_VALUE: 21
PIF_VALUE: 31
PIF_VALUE: 15
PIF_VALUE: 15
PIF_VALUE: 16
PIF_VALUE: 15
PIF_VALUE: 17
PIF_VALUE: 14
PIF_VALUE: 16
PIF_VALUE: 17
PIF_VALUE: 19
PIF_VALUE: 17
PIF_VALUE: 16
PIF_VALUE: 25
PIF_VALUE: 29
PIF_VALUE: 27
PIF_VALUE: 18
PIF_VALUE: 23
PIF_VALUE: 15
PIF_VALUE: 16
PIF_VALUE: 15
PIF_VALUE: 15
PIF_VALUE: 16
PIF_VALUE: 18
PIF_VALUE: 15
PIF_VALUE: 16
PIF_VALUE: 16
PIF_VALUE: 19
PIF_VALUE: 18
PIF_VALUE: 20
PIF_VALUE: 30
PIF_VALUE: 19
PIF_VALUE: 20
PIF_VALUE: 31
PIF_VALUE: 19
PIF_VALUE: 16
PIF_VALUE: 16
PIF_VALUE: 18
PIF_VALUE: 16
PIF_VALUE: 22
PIF_VALUE: 19
PIF_VALUE: 34
PIF_VALUE: 27
PIF_VALUE: 19
PIF_VALUE: 21
PIF_VALUE: 18
PIF_VALUE: 26
PIF_VALUE: 15
PIF_VALUE: 13
PIF_VALUE: 20
PIF_VALUE: 30
PIF_VALUE: 19
PIF_VALUE: 20
PIF_VALUE: 17
PIF_VALUE: 22
PIF_VALUE: 24
PIF_VALUE: 35
PIF_VALUE: 29
PIF_VALUE: 24
PIF_VALUE: 18
PIF_VALUE: 16
PIF_VALUE: 28
PIF_VALUE: 16
PIF_VALUE: 26
PIF_VALUE: 19
PIF_VALUE: 17
PIF_VALUE: 15
PIF_VALUE: 16
PIF_VALUE: 17
PIF_VALUE: 21
PIF_VALUE: 16
PIF_VALUE: 29
PIF_VALUE: 37
PIF_VALUE: 17
PIF_VALUE: 36
PIF_VALUE: 15
PIF_VALUE: 21

## 2023-01-01 ASSESSMENT — PAIN SCALES - GENERAL
PAINLEVEL_OUTOF10: 0
PAINLEVEL_OUTOF10: 8
PAINLEVEL_OUTOF10: 0
PAINLEVEL_OUTOF10: 2
PAINLEVEL_OUTOF10: 0
PAINLEVEL_OUTOF10: 1
PAINLEVEL_OUTOF10: 0

## 2023-01-01 ASSESSMENT — PAIN DESCRIPTION - LOCATION
LOCATION: GENERALIZED
LOCATION: GENERALIZED

## 2023-01-01 ASSESSMENT — PAIN SCALES - WONG BAKER: WONGBAKER_NUMERICALRESPONSE: 4

## 2023-01-01 ASSESSMENT — PAIN DESCRIPTION - DESCRIPTORS: DESCRIPTORS: OTHER (COMMENT)

## 2023-01-01 ASSESSMENT — PAIN - FUNCTIONAL ASSESSMENT: PAIN_FUNCTIONAL_ASSESSMENT: 0-10

## 2023-01-01 ASSESSMENT — PAIN DESCRIPTION - ORIENTATION: ORIENTATION: OTHER (COMMENT)

## 2023-03-27 ENCOUNTER — HOSPITAL ENCOUNTER (EMERGENCY)
Age: 58
Discharge: HOME | End: 2023-03-28
Payer: COMMERCIAL

## 2023-03-27 ENCOUNTER — APPOINTMENT (OUTPATIENT)
Dept: CT IMAGING | Age: 58
End: 2023-03-27
Payer: COMMERCIAL

## 2023-03-27 DIAGNOSIS — R91.8 MASS OF UPPER LOBE OF LEFT LUNG: Primary | ICD-10-CM

## 2023-03-27 DIAGNOSIS — I10 ESSENTIAL HYPERTENSION: ICD-10-CM

## 2023-03-27 DIAGNOSIS — R07.9 CHEST PAIN, UNSPECIFIED TYPE: ICD-10-CM

## 2023-03-27 DIAGNOSIS — R91.8 MASS OF UPPER LOBE OF RIGHT LUNG: ICD-10-CM

## 2023-03-27 LAB
ALBUMIN SERPL-MCNC: 3.8 GM/DL (ref 3.4–5)
ALP BLD-CCNC: 107 IU/L (ref 40–129)
ALT SERPL-CCNC: 12 U/L (ref 10–40)
ANION GAP SERPL CALCULATED.3IONS-SCNC: 10 MMOL/L (ref 4–16)
AST SERPL-CCNC: 15 IU/L (ref 15–37)
BILIRUB SERPL-MCNC: 0.2 MG/DL (ref 0–1)
BUN SERPL-MCNC: 14 MG/DL (ref 6–23)
CALCIUM SERPL-MCNC: 11.1 MG/DL (ref 8.3–10.6)
CHLORIDE BLD-SCNC: 101 MMOL/L (ref 99–110)
CO2: 26 MMOL/L (ref 21–32)
CREAT SERPL-MCNC: 1.1 MG/DL (ref 0.9–1.3)
GFR SERPL CREATININE-BSD FRML MDRD: >60 ML/MIN/1.73M2
GLUCOSE SERPL-MCNC: 87 MG/DL (ref 70–99)
HCT VFR BLD CALC: 44.5 % (ref 42–52)
HEMOGLOBIN: 14 GM/DL (ref 13.5–18)
MCH RBC QN AUTO: 28.4 PG (ref 27–31)
MCHC RBC AUTO-ENTMCNC: 31.5 % (ref 32–36)
MCV RBC AUTO: 90.3 FL (ref 78–100)
PDW BLD-RTO: 12.3 % (ref 11.7–14.9)
PLATELET # BLD: 484 K/CU MM (ref 140–440)
PMV BLD AUTO: 8.9 FL (ref 7.5–11.1)
POTASSIUM SERPL-SCNC: 3.9 MMOL/L (ref 3.5–5.1)
PRO-BNP: 151.5 PG/ML
RBC # BLD: 4.93 M/CU MM (ref 4.6–6.2)
SODIUM BLD-SCNC: 137 MMOL/L (ref 135–145)
TOTAL PROTEIN: 8.5 GM/DL (ref 6.4–8.2)
TROPONIN T: <0.01 NG/ML
WBC # BLD: 17.7 K/CU MM (ref 4–10.5)

## 2023-03-27 PROCEDURE — 6370000000 HC RX 637 (ALT 250 FOR IP): Performed by: PHYSICIAN ASSISTANT

## 2023-03-27 PROCEDURE — 96375 TX/PRO/DX INJ NEW DRUG ADDON: CPT

## 2023-03-27 PROCEDURE — 6360000002 HC RX W HCPCS: Performed by: PHYSICIAN ASSISTANT

## 2023-03-27 PROCEDURE — 99285 EMERGENCY DEPT VISIT HI MDM: CPT

## 2023-03-27 PROCEDURE — 96374 THER/PROPH/DIAG INJ IV PUSH: CPT

## 2023-03-27 PROCEDURE — 85027 COMPLETE CBC AUTOMATED: CPT

## 2023-03-27 PROCEDURE — 87150 DNA/RNA AMPLIFIED PROBE: CPT

## 2023-03-27 PROCEDURE — 84484 ASSAY OF TROPONIN QUANT: CPT

## 2023-03-27 PROCEDURE — 87040 BLOOD CULTURE FOR BACTERIA: CPT

## 2023-03-27 PROCEDURE — 83880 ASSAY OF NATRIURETIC PEPTIDE: CPT

## 2023-03-27 PROCEDURE — 71275 CT ANGIOGRAPHY CHEST: CPT

## 2023-03-27 PROCEDURE — 6360000004 HC RX CONTRAST MEDICATION: Performed by: PHYSICIAN ASSISTANT

## 2023-03-27 PROCEDURE — 80053 COMPREHEN METABOLIC PANEL: CPT

## 2023-03-27 PROCEDURE — 93005 ELECTROCARDIOGRAM TRACING: CPT | Performed by: PHYSICIAN ASSISTANT

## 2023-03-27 RX ORDER — CLONIDINE HYDROCHLORIDE 0.1 MG/1
0.1 TABLET ORAL ONCE
Status: COMPLETED | OUTPATIENT
Start: 2023-03-27 | End: 2023-03-27

## 2023-03-27 RX ORDER — ASPIRIN 81 MG/1
324 TABLET, CHEWABLE ORAL ONCE
Status: COMPLETED | OUTPATIENT
Start: 2023-03-27 | End: 2023-03-27

## 2023-03-27 RX ORDER — MORPHINE SULFATE 4 MG/ML
4 INJECTION, SOLUTION INTRAMUSCULAR; INTRAVENOUS ONCE
Status: COMPLETED | OUTPATIENT
Start: 2023-03-27 | End: 2023-03-27

## 2023-03-27 RX ORDER — ONDANSETRON 2 MG/ML
4 INJECTION INTRAMUSCULAR; INTRAVENOUS ONCE
Status: COMPLETED | OUTPATIENT
Start: 2023-03-27 | End: 2023-03-27

## 2023-03-27 RX ORDER — OXYCODONE HYDROCHLORIDE AND ACETAMINOPHEN 5; 325 MG/1; MG/1
1 TABLET ORAL ONCE
Status: COMPLETED | OUTPATIENT
Start: 2023-03-27 | End: 2023-03-27

## 2023-03-27 RX ORDER — KETOROLAC TROMETHAMINE 30 MG/ML
30 INJECTION, SOLUTION INTRAMUSCULAR; INTRAVENOUS ONCE
Status: COMPLETED | OUTPATIENT
Start: 2023-03-27 | End: 2023-03-27

## 2023-03-27 RX ADMIN — ONDANSETRON 4 MG: 2 INJECTION INTRAMUSCULAR; INTRAVENOUS at 19:02

## 2023-03-27 RX ADMIN — KETOROLAC TROMETHAMINE 30 MG: 30 INJECTION, SOLUTION INTRAMUSCULAR; INTRAVENOUS at 23:36

## 2023-03-27 RX ADMIN — CLONIDINE HYDROCHLORIDE 0.1 MG: 0.1 TABLET ORAL at 19:08

## 2023-03-27 RX ADMIN — OXYCODONE AND ACETAMINOPHEN 1 TABLET: 5; 325 TABLET ORAL at 21:38

## 2023-03-27 RX ADMIN — IOPAMIDOL 75 ML: 755 INJECTION, SOLUTION INTRAVENOUS at 19:49

## 2023-03-27 RX ADMIN — ASPIRIN 81 MG 324 MG: 81 TABLET ORAL at 19:01

## 2023-03-27 RX ADMIN — HYDROMORPHONE HYDROCHLORIDE 0.5 MG: 1 INJECTION, SOLUTION INTRAMUSCULAR; INTRAVENOUS; SUBCUTANEOUS at 23:36

## 2023-03-27 RX ADMIN — MORPHINE SULFATE 4 MG: 4 INJECTION, SOLUTION INTRAMUSCULAR; INTRAVENOUS at 19:01

## 2023-03-27 ASSESSMENT — PAIN DESCRIPTION - ORIENTATION
ORIENTATION: RIGHT
ORIENTATION: RIGHT

## 2023-03-27 ASSESSMENT — PAIN SCALES - GENERAL
PAINLEVEL_OUTOF10: 10
PAINLEVEL_OUTOF10: 6
PAINLEVEL_OUTOF10: 8

## 2023-03-27 ASSESSMENT — LIFESTYLE VARIABLES
HOW MANY STANDARD DRINKS CONTAINING ALCOHOL DO YOU HAVE ON A TYPICAL DAY: PATIENT DOES NOT DRINK
HOW OFTEN DO YOU HAVE A DRINK CONTAINING ALCOHOL: MONTHLY OR LESS
HOW OFTEN DO YOU HAVE A DRINK CONTAINING ALCOHOL: PATIENT DECLINED

## 2023-03-27 ASSESSMENT — PAIN DESCRIPTION - LOCATION
LOCATION: CHEST
LOCATION: CHEST
LOCATION: CHEST;RIB CAGE
LOCATION: CHEST

## 2023-03-27 ASSESSMENT — PAIN DESCRIPTION - DESCRIPTORS
DESCRIPTORS: SHARP
DESCRIPTORS: ACHING;BURNING

## 2023-03-27 ASSESSMENT — PAIN - FUNCTIONAL ASSESSMENT
PAIN_FUNCTIONAL_ASSESSMENT: ACTIVITIES ARE NOT PREVENTED

## 2023-03-27 ASSESSMENT — PAIN DESCRIPTION - FREQUENCY: FREQUENCY: CONTINUOUS

## 2023-03-27 ASSESSMENT — PAIN DESCRIPTION - ONSET: ONSET: ON-GOING

## 2023-03-27 ASSESSMENT — PAIN DESCRIPTION - PAIN TYPE: TYPE: ACUTE PAIN

## 2023-03-27 NOTE — ED NOTES
Received bedside report from [facility] assumed care @ this time.       Rosita Xiao RN  03/27/23 [id number]

## 2023-03-27 NOTE — ED TRIAGE NOTES
Pt.c/o chest pain with burning to his right rib cage that started three weak's ago and keeps getting worse. Pt stating \"has been found a lump on his right lung a year ago and asked to get biopsy but ignored\".

## 2023-03-27 NOTE — ED PROVIDER NOTES
ED attending EKG interpretation (otherwise did not participate in the care of this patient)    EKG Interpretation #1 (17: 47)  Interpreted by me  Compared to 6/24/2022  Rhythm: sinus tachycardia  Rate: tachycardic 108  Axis: normal  Ectopy: none  Conduction: normal  ST Segments: mild depression in leads II, 3, aVF, V3 through V6  T Waves: no acute change  Clinical Impression: normal sinus rhythm with mild ST depression in leads II, 3, aVF, V3 through V6      EKG Interpretation #2 (19:00)  Interpreted by me  Compared to prior EKG from today  Rhythm: normal sinus   Rate: normal 83  Axis: normal  Ectopy: none  Conduction: normal  ST Segments: Previously seen ST depression has resolved  T Waves: no acute change  Clinical Impression: normal sinus rhythm, previously seen ST depression has resolved     Abdoul Torres MD  03/27/23 1910
Rate and Rhythm: Normal rate and regular rhythm. Heart sounds: Normal heart sounds. No murmur heard. No friction rub. No gallop. Pulmonary:      Effort: Pulmonary effort is normal. No respiratory distress. Breath sounds: Normal breath sounds. No stridor. No wheezing or rales. Chest:      Chest wall: No tenderness. Abdominal:      General: Bowel sounds are normal. There is no distension. Palpations: Abdomen is soft. There is no mass. Tenderness: There is no abdominal tenderness. There is no guarding or rebound. Musculoskeletal:         General: No tenderness. Normal range of motion. Cervical back: Normal range of motion and neck supple. Skin:     General: Skin is warm and dry. Coloration: Skin is not pale. Findings: Rash present. Rash is crusting and papular. Rash is not purpuric, urticarial or vesicular. Neurological:      Mental Status: He is alert and oriented to person, place, and time. Coordination: Coordination normal.   Psychiatric:         Mood and Affect: Mood normal.         Behavior: Behavior normal.         DIAGNOSTIC RESULTS     LABS:    Labs Reviewed   CBC - Abnormal; Notable for the following components:       Result Value    WBC 17.7 (*)     MCHC 31.5 (*)     Platelets 380 (*)     All other components within normal limits   COMPREHENSIVE METABOLIC PANEL - Abnormal; Notable for the following components:    Calcium 11.1 (*)     Total Protein 8.5 (*)     All other components within normal limits   CULTURE, BLOOD 1   CULTURE, BLOOD 2   TROPONIN   BRAIN NATRIURETIC PEPTIDE   TROPONIN   SPECIMEN REJECTION       When ordered only abnormal lab results are displayed. All other labs were within normal range or not returned as of this dictation. EKG: When ordered, EKG's are interpreted by the Emergency Department Physician in the absence of a cardiologist.  Please see their note for interpretation of EKG.     IMAGING:   Non-plain film images such as CT,

## 2023-03-28 VITALS
WEIGHT: 145 LBS | DIASTOLIC BLOOD PRESSURE: 88 MMHG | RESPIRATION RATE: 14 BRPM | SYSTOLIC BLOOD PRESSURE: 146 MMHG | TEMPERATURE: 97.8 F | BODY MASS INDEX: 21.48 KG/M2 | OXYGEN SATURATION: 99 % | HEART RATE: 66 BPM | HEIGHT: 69 IN

## 2023-03-28 LAB
EKG ATRIAL RATE: 108 BPM
EKG ATRIAL RATE: 83 BPM
EKG DIAGNOSIS: NORMAL
EKG DIAGNOSIS: NORMAL
EKG P AXIS: 55 DEGREES
EKG P AXIS: 77 DEGREES
EKG P-R INTERVAL: 104 MS
EKG P-R INTERVAL: 118 MS
EKG Q-T INTERVAL: 324 MS
EKG Q-T INTERVAL: 352 MS
EKG QRS DURATION: 80 MS
EKG QRS DURATION: 84 MS
EKG QTC CALCULATION (BAZETT): 413 MS
EKG QTC CALCULATION (BAZETT): 434 MS
EKG R AXIS: 70 DEGREES
EKG R AXIS: 74 DEGREES
EKG T AXIS: 68 DEGREES
EKG T AXIS: 92 DEGREES
EKG VENTRICULAR RATE: 108 BPM
EKG VENTRICULAR RATE: 83 BPM
TROPONIN T: <0.01 NG/ML

## 2023-03-28 PROCEDURE — 84484 ASSAY OF TROPONIN QUANT: CPT

## 2023-03-28 PROCEDURE — 93010 ELECTROCARDIOGRAM REPORT: CPT | Performed by: INTERNAL MEDICINE

## 2023-03-28 RX ORDER — MORPHINE SULFATE 15 MG/1
15 TABLET ORAL EVERY 6 HOURS PRN
Qty: 10 TABLET | Refills: 0 | Status: SHIPPED | OUTPATIENT
Start: 2023-03-28 | End: 2023-03-29 | Stop reason: SDUPTHER

## 2023-03-28 RX ORDER — AMLODIPINE BESYLATE 5 MG/1
10 TABLET ORAL DAILY
Qty: 60 TABLET | Refills: 0 | Status: SHIPPED | OUTPATIENT
Start: 2023-03-28 | End: 2023-04-27

## 2023-03-28 RX ORDER — NAPROXEN 500 MG/1
500 TABLET ORAL 2 TIMES DAILY WITH MEALS
Qty: 20 TABLET | Refills: 0 | Status: SHIPPED | OUTPATIENT
Start: 2023-03-28 | End: 2023-04-07

## 2023-03-28 RX ORDER — ONDANSETRON 4 MG/1
4 TABLET, FILM COATED ORAL EVERY 8 HOURS PRN
Qty: 20 TABLET | Refills: 0 | Status: SHIPPED | OUTPATIENT
Start: 2023-03-28

## 2023-03-28 ASSESSMENT — PAIN DESCRIPTION - LOCATION
LOCATION: CHEST
LOCATION: CHEST

## 2023-03-28 ASSESSMENT — ENCOUNTER SYMPTOMS: BACK PAIN: 0

## 2023-03-28 ASSESSMENT — PAIN - FUNCTIONAL ASSESSMENT: PAIN_FUNCTIONAL_ASSESSMENT: ACTIVITIES ARE NOT PREVENTED

## 2023-03-28 ASSESSMENT — PAIN SCALES - GENERAL: PAINLEVEL_OUTOF10: 6

## 2023-03-28 ASSESSMENT — LIFESTYLE VARIABLES
HOW MANY STANDARD DRINKS CONTAINING ALCOHOL DO YOU HAVE ON A TYPICAL DAY: PATIENT DOES NOT DRINK
HOW OFTEN DO YOU HAVE A DRINK CONTAINING ALCOHOL: MONTHLY OR LESS

## 2023-03-28 ASSESSMENT — HEART SCORE: ECG: 0

## 2023-03-28 NOTE — ED NOTES
Tropnin hemolyzed needs redraw. Patient states pain is better. A/O x 4 sitting on side of bed conversating with family.      Harpal Dey RN  03/28/23 1996

## 2023-03-28 NOTE — DISCHARGE INSTRUCTIONS
As discussed with you today:    The CAT scan today shows that the mass in your lungs are growing in size. This is concerning for malignancy. There are several options and treatments for cancer. He should have continued evaluation and treatment with a oncologist for definitive diagnosis and management. Please call the oncologist office first thing tomorrow to discuss your visit to the emergency department, your CAT scan findings, to schedule an appointment next week with one of the providers for further evaluation. But did know that you are referred from the emergency department. Return to emergency department if you develop any fever, worsening pain, difficulty breathing, passing out, coughing up blood, worsening symptoms or any new concerns.

## 2023-03-29 ENCOUNTER — HOSPITAL ENCOUNTER (OUTPATIENT)
Dept: INFUSION THERAPY | Age: 58
Discharge: HOME | End: 2023-03-29
Payer: COMMERCIAL

## 2023-03-29 ENCOUNTER — INITIAL CONSULT (OUTPATIENT)
Dept: ONCOLOGY | Age: 58
End: 2023-03-29
Payer: COMMERCIAL

## 2023-03-29 VITALS
DIASTOLIC BLOOD PRESSURE: 69 MMHG | HEART RATE: 98 BPM | WEIGHT: 126 LBS | OXYGEN SATURATION: 96 % | TEMPERATURE: 98 F | SYSTOLIC BLOOD PRESSURE: 123 MMHG | HEIGHT: 69 IN | BODY MASS INDEX: 18.66 KG/M2

## 2023-03-29 DIAGNOSIS — R52 PAIN: Primary | ICD-10-CM

## 2023-03-29 DIAGNOSIS — R91.8 MASS OF UPPER LOBE OF RIGHT LUNG: ICD-10-CM

## 2023-03-29 DIAGNOSIS — R07.9 CHEST PAIN, UNSPECIFIED TYPE: ICD-10-CM

## 2023-03-29 DIAGNOSIS — R91.8 MASS OF UPPER LOBE OF LEFT LUNG: ICD-10-CM

## 2023-03-29 PROCEDURE — 99204 OFFICE O/P NEW MOD 45 MIN: CPT | Performed by: INTERNAL MEDICINE

## 2023-03-29 PROCEDURE — 99211 OFF/OP EST MAY X REQ PHY/QHP: CPT

## 2023-03-29 PROCEDURE — G8427 DOCREV CUR MEDS BY ELIG CLIN: HCPCS | Performed by: INTERNAL MEDICINE

## 2023-03-29 PROCEDURE — G8484 FLU IMMUNIZE NO ADMIN: HCPCS | Performed by: INTERNAL MEDICINE

## 2023-03-29 PROCEDURE — 3017F COLORECTAL CA SCREEN DOC REV: CPT | Performed by: INTERNAL MEDICINE

## 2023-03-29 PROCEDURE — G8420 CALC BMI NORM PARAMETERS: HCPCS | Performed by: INTERNAL MEDICINE

## 2023-03-29 PROCEDURE — 1036F TOBACCO NON-USER: CPT | Performed by: INTERNAL MEDICINE

## 2023-03-29 RX ORDER — MORPHINE SULFATE 15 MG/1
15 TABLET ORAL EVERY 6 HOURS PRN
Qty: 60 TABLET | Refills: 0 | Status: SHIPPED | OUTPATIENT
Start: 2023-03-29 | End: 2023-03-29 | Stop reason: SDUPTHER

## 2023-03-29 RX ORDER — MORPHINE SULFATE 15 MG/1
15 TABLET ORAL EVERY 6 HOURS PRN
Qty: 60 TABLET | Refills: 0 | Status: SHIPPED | OUTPATIENT
Start: 2023-03-29 | End: 2023-04-13

## 2023-03-29 ASSESSMENT — PATIENT HEALTH QUESTIONNAIRE - PHQ9
SUM OF ALL RESPONSES TO PHQ QUESTIONS 1-9: 0
SUM OF ALL RESPONSES TO PHQ QUESTIONS 1-9: 0
SUM OF ALL RESPONSES TO PHQ9 QUESTIONS 1 & 2: 0
2. FEELING DOWN, DEPRESSED OR HOPELESS: 0
SUM OF ALL RESPONSES TO PHQ QUESTIONS 1-9: 0
SUM OF ALL RESPONSES TO PHQ QUESTIONS 1-9: 0
1. LITTLE INTEREST OR PLEASURE IN DOING THINGS: 0

## 2023-03-29 NOTE — PROGRESS NOTES
MA Rooming Questions  Patient: Krissy Bolden  MRN: [id number]    Date: 3/29/2023        NEW PATIENT     5. Did the patient have a depression screening completed today?  Yes    PHQ-9 Total Score: 0 (3/29/2023  8:59 AM)       PHQ-9 Given to (if applicable):               PHQ-9 Score (if applicable):                     [] Positive     []  Negative              Does question #9 need addressed (if applicable)                     [] Yes    []  No               Martha Zacarias CMA
constipation, No diarrhea, No hematochezia, No melena, No jaundice, No dyspepsia, No dysphagia. Urinary:  No dysuria, No hematuria, No urinary incontinence. Musculoskeletal:  No muscle pain, No swollen joints, No joint redness, No bone pain, No spine tenderness. Skin:  No rash, No nodules, No pruritus, No lesions. Neurologic:  No confusion, No seizures, No syncope, No tremor, No speech change, No headache, No hiccups, No abnormal gait, No sensory changes, No weakness. Psychiatric:  No depression, No anxiety, Concentration normal.  Endocrine:  No polyuria, No polydipsia, No hot flashes, No thyroid symptoms. Hematologic:  No epistaxis, No gingival bleeding, No petechiae, No ecchymosis. Lymphatic:  No lymphadenopathy, No lymphedema. Allergy / Immunologic:  No eczema, No frequent mucous infections, No frequent respiratory infections, No recurrent urticarial, No frequent skin infections. Vital Signs: /69 (Site: Right Upper Arm, Position: Sitting, Cuff Size: Medium Adult)   Pulse 98   Temp 98 °F (36.7 °C) (Temporal)   Ht 5' 9\" (1.753 m)   Wt 126 lb (57.2 kg)   SpO2 96%   BMI 18.61 kg/m²      Physical Exam:  CONSTITUTIONAL: awake, alert, cooperative, no apparent distress   EYES: pupils equal, round and reactive to light, sclera clear and conjunctiva normal  ENT: Normocephalic, without obvious abnormality, atraumatic  NECK: supple, symmetrical, no jugular venous distension and no carotid bruits   HEMATOLOGIC/LYMPHATIC: no cervical, supraclavicular or axillary lymphadenopathy   LUNGS: no increased work of breathing and clear to auscultation   CARDIOVASCULAR: regular rate and rhythm, normal S1 and S2, no murmur noted  ABDOMEN: normal bowel sounds x 4, soft, non-distended, non-tender, no masses palpated, no hepatosplenomegaly   MUSCULOSKELETAL: full range of motion noted, tone is normal  NEUROLOGIC: awake, alert, oriented to name, place and time. Motor skills grossly intact.    SKIN: Normal skin color,

## 2023-03-30 ENCOUNTER — TELEPHONE (OUTPATIENT)
Dept: ONCOLOGY | Age: 58
End: 2023-03-30

## 2023-03-30 ENCOUNTER — CLINICAL DOCUMENTATION (OUTPATIENT)
Dept: ONCOLOGY | Age: 58
End: 2023-03-30

## 2023-03-30 NOTE — TELEPHONE ENCOUNTER
Patient called given biopsy time and prep to be done at [facility] on 4/6 arrival at 730 AM.

## 2023-04-01 LAB
CULTURE: ABNORMAL
CULTURE: ABNORMAL
CULTURE: NORMAL
Lab: ABNORMAL
Lab: NORMAL
SPECIMEN: ABNORMAL
SPECIMEN: NORMAL

## 2023-04-03 LAB
CULTURE: ABNORMAL
CULTURE: ABNORMAL
Lab: ABNORMAL
SPECIMEN: ABNORMAL

## 2023-04-03 NOTE — PATIENT INSTRUCTIONS
IR Procedure at [address]: Spoke with patient and he will arrive at 0800 at [address] on 4/6/2023 for his procedure at 0930. Also went over below instructions. Patient states \"he no longer takes takes aspirin or plavix. \"    NPO at [address]    2. Follow your directions as prescribed by the doctor for your procedure and medications. 3.   Consult your provider as to when to stop blood thinner  4. Do not take any pain medication within 6 hours of your procedure  5. Do not drink any alcoholic beverages or use any street drugs 24 hours before procedure. 6.   Please wear simple, loose fitting clothing to the hospital.  Do not bring valuables (money,             credit cards, checkbooks, etc.)     7. If you  have a Living Will and Durable Power of  for Healthcare, please bring in a copy. 8.   Please bring picture ID,  insurance card, paperwork from the doctors office            (H & P, Consent,  & card for implantable devices). 9.   Report to the information desk on the ground floor. 10. Take a shower the night before or morning of your procedure, do not apply any lotion, oil or powder. 11. If you are going to be sedated for the procedure, you will need a responsible adult to drive you home.

## 2023-04-06 ENCOUNTER — HOSPITAL ENCOUNTER (OUTPATIENT)
Dept: CT IMAGING | Age: 58
Discharge: HOME | End: 2023-04-06
Payer: COMMERCIAL

## 2023-04-06 VITALS
SYSTOLIC BLOOD PRESSURE: 150 MMHG | TEMPERATURE: 97.5 F | OXYGEN SATURATION: 97 % | DIASTOLIC BLOOD PRESSURE: 78 MMHG | HEART RATE: 82 BPM | RESPIRATION RATE: 16 BRPM

## 2023-04-06 PROCEDURE — 85610 PROTHROMBIN TIME: CPT

## 2023-04-06 PROCEDURE — 85730 THROMBOPLASTIN TIME PARTIAL: CPT

## 2023-04-06 RX ORDER — SODIUM CHLORIDE 0.9 % (FLUSH) 0.9 %
10 SYRINGE (ML) INJECTION PRN
Status: DISCONTINUED | OUTPATIENT
Start: 2023-04-06 | End: 2023-04-07 | Stop reason: HOSPADM

## 2023-04-06 NOTE — PROGRESS NOTES
848 left message via perfect serve for [facility]ia coe at [facility], pt spoke with dr Aarti Price and is not sure he wants to have the lung biopsy.  Notified bipin in IR

## 2023-04-12 ENCOUNTER — HOSPITAL ENCOUNTER (OUTPATIENT)
Dept: INFUSION THERAPY | Age: 58
Discharge: HOME | End: 2023-04-12
Payer: COMMERCIAL

## 2023-04-12 PROCEDURE — 99211 OFF/OP EST MAY X REQ PHY/QHP: CPT

## 2023-04-17 ENCOUNTER — TELEPHONE (OUTPATIENT)
Dept: ONCOLOGY | Age: 58
End: 2023-04-17

## 2023-04-17 NOTE — TELEPHONE ENCOUNTER
Patient called given time and prep for PET scan to be done on 4/26  [facility] arrival at 8 AM.

## 2023-04-27 ENCOUNTER — HOSPITAL ENCOUNTER (OUTPATIENT)
Dept: PET IMAGING | Age: 58
Discharge: HOME | End: 2023-04-27
Payer: COMMERCIAL

## 2023-04-27 DIAGNOSIS — R91.8 MASS OF UPPER LOBE OF RIGHT LUNG: ICD-10-CM

## 2023-04-27 DIAGNOSIS — R91.8 MASS OF UPPER LOBE OF LEFT LUNG: ICD-10-CM

## 2023-04-27 PROCEDURE — 2580000003 HC RX 258: Performed by: INTERNAL MEDICINE

## 2023-04-27 PROCEDURE — 3430000000 HC RX DIAGNOSTIC RADIOPHARMACEUTICAL: Performed by: INTERNAL MEDICINE

## 2023-04-27 PROCEDURE — 78815 PET IMAGE W/CT SKULL-THIGH: CPT

## 2023-04-27 PROCEDURE — A9552 F18 FDG: HCPCS | Performed by: INTERNAL MEDICINE

## 2023-04-27 RX ORDER — FLUDEOXYGLUCOSE F 18 200 MCI/ML
14.85 INJECTION, SOLUTION INTRAVENOUS
Status: COMPLETED | OUTPATIENT
Start: 2023-04-27 | End: 2023-04-27

## 2023-04-27 RX ORDER — SODIUM CHLORIDE 0.9 % (FLUSH) 0.9 %
10 SYRINGE (ML) INJECTION PRN
Status: COMPLETED | OUTPATIENT
Start: 2023-04-27 | End: 2023-04-27

## 2023-04-27 RX ADMIN — SODIUM CHLORIDE, PRESERVATIVE FREE 10 ML: 5 INJECTION INTRAVENOUS at 13:33

## 2023-04-27 RX ADMIN — FLUDEOXYGLUCOSE F 18 14.85 MILLICURIE: 200 INJECTION, SOLUTION INTRAVENOUS at 13:33

## 2023-05-03 ENCOUNTER — CLINICAL DOCUMENTATION (OUTPATIENT)
Dept: ONCOLOGY | Age: 58
End: 2023-05-03

## 2023-05-03 DIAGNOSIS — R07.9 CHEST PAIN, UNSPECIFIED TYPE: ICD-10-CM

## 2023-05-03 DIAGNOSIS — R91.8 MASS OF UPPER LOBE OF LEFT LUNG: ICD-10-CM

## 2023-05-03 DIAGNOSIS — R91.8 MASS OF UPPER LOBE OF RIGHT LUNG: ICD-10-CM

## 2023-05-03 RX ORDER — MORPHINE SULFATE 15 MG/1
15 TABLET ORAL EVERY 6 HOURS PRN
Qty: 60 TABLET | Refills: 0 | Status: SHIPPED | OUTPATIENT
Start: 2023-05-03 | End: 2023-05-18

## 2023-05-03 NOTE — PROGRESS NOTES
This nurse received a call from the patient in regards to needing to cancel his appointment on 05/04/2023 and that he needs to have his pain medication refilled. This nurse spoke with Dr Holley Matamoros and his in agreement to refill the patient's morphine, this nurse pended the order to the physician. The patient will also need to follow with pain management and the physician would like a referral sent to Radiation Oncology for palliative radiation. Both referrals placed. This nurse called the patient back @ [phone] to notify him of the referrals.

## 2023-06-07 ENCOUNTER — HOSPITAL ENCOUNTER (INPATIENT)
Age: 58
LOS: 9 days | Discharge: STILL ADMITTED OR ADMINISTRATIVE | DRG: 720 | End: 2023-06-16
Attending: EMERGENCY MEDICINE | Admitting: INTERNAL MEDICINE
Payer: COMMERCIAL

## 2023-06-07 ENCOUNTER — APPOINTMENT (OUTPATIENT)
Dept: CT IMAGING | Age: 58
DRG: 720 | End: 2023-06-07
Payer: COMMERCIAL

## 2023-06-07 ENCOUNTER — APPOINTMENT (OUTPATIENT)
Dept: GENERAL RADIOLOGY | Age: 58
DRG: 720 | End: 2023-06-07
Payer: COMMERCIAL

## 2023-06-07 DIAGNOSIS — J18.9 PNEUMONIA DUE TO INFECTIOUS ORGANISM, UNSPECIFIED LATERALITY, UNSPECIFIED PART OF LUNG: ICD-10-CM

## 2023-06-07 DIAGNOSIS — Z85.118 HISTORY OF LUNG CANCER: ICD-10-CM

## 2023-06-07 DIAGNOSIS — R91.8 LUNG MASS: ICD-10-CM

## 2023-06-07 DIAGNOSIS — R06.00 DYSPNEA, UNSPECIFIED TYPE: Primary | ICD-10-CM

## 2023-06-07 LAB
ALBUMIN SERPL-MCNC: 3.3 GM/DL (ref 3.4–5)
ALP BLD-CCNC: 117 IU/L (ref 40–129)
ALT SERPL-CCNC: 17 U/L (ref 10–40)
ANION GAP SERPL CALCULATED.3IONS-SCNC: 17 MMOL/L (ref 4–16)
AST SERPL-CCNC: 16 IU/L (ref 15–37)
BACTERIA: NEGATIVE /HPF
BANDED NEUTROPHILS ABSOLUTE COUNT: 4.34 K/CU MM
BANDED NEUTROPHILS RELATIVE PERCENT: 13 % (ref 5–11)
BASOPHILIC STIPPLING: ABNORMAL
BILIRUB SERPL-MCNC: 0.6 MG/DL (ref 0–1)
BILIRUBIN URINE: NEGATIVE MG/DL
BLOOD, URINE: NEGATIVE
BUN SERPL-MCNC: 12 MG/DL (ref 6–23)
CALCIUM SERPL-MCNC: 14.9 MG/DL (ref 8.3–10.6)
CHLORIDE BLD-SCNC: 91 MMOL/L (ref 99–110)
CLARITY: CLEAR
CO2: 23 MMOL/L (ref 21–32)
COLOR: YELLOW
CREAT SERPL-MCNC: 1.4 MG/DL (ref 0.9–1.3)
CRP SERPL HS-MCNC: 122.1 MG/L
DIFFERENTIAL TYPE: ABNORMAL
GFR SERPL CREATININE-BSD FRML MDRD: 59 ML/MIN/1.73M2
GLUCOSE SERPL-MCNC: 127 MG/DL (ref 70–99)
GLUCOSE, URINE: NEGATIVE MG/DL
HCT VFR BLD CALC: 37.3 % (ref 42–52)
HEMOGLOBIN: 11.9 GM/DL (ref 13.5–18)
HYALINE CASTS: 0 /LPF
KETONES, URINE: NEGATIVE MG/DL
LACTATE: 6.2 MMOL/L (ref 0.5–1.9)
LACTIC ACID, SEPSIS: 2 MMOL/L (ref 0.5–1.9)
LACTIC ACID, SEPSIS: 2.7 MMOL/L (ref 0.5–1.9)
LEUKOCYTE ESTERASE, URINE: NEGATIVE
LYMPHOCYTES ABSOLUTE: 2 K/CU MM
LYMPHOCYTES RELATIVE PERCENT: 6 % (ref 24–44)
MCH RBC QN AUTO: 27.7 PG (ref 27–31)
MCHC RBC AUTO-ENTMCNC: 31.9 % (ref 32–36)
MCV RBC AUTO: 86.9 FL (ref 78–100)
METAMYELOCYTES ABSOLUTE COUNT: 0.33 K/CU MM
METAMYELOCYTES PERCENT: 1 %
MONOCYTES ABSOLUTE: 1.3 K/CU MM
MONOCYTES RELATIVE PERCENT: 4 % (ref 0–4)
MUCUS: ABNORMAL HPF
NITRITE URINE, QUANTITATIVE: NEGATIVE
PDW BLD-RTO: 13.2 % (ref 11.7–14.9)
PH, URINE: 7 (ref 5–8)
PLATELET # BLD: 483 K/CU MM (ref 140–440)
PMV BLD AUTO: 8.7 FL (ref 7.5–11.1)
POLYCHROMASIA: ABNORMAL
POTASSIUM SERPL-SCNC: 2.7 MMOL/L (ref 3.5–5.1)
PRO-BNP: 478.6 PG/ML
PROCALCITONIN SERPL-MCNC: 0.33 NG/ML
PROTEIN UA: ABNORMAL MG/DL
RBC # BLD: 4.29 M/CU MM (ref 4.6–6.2)
RBC URINE: <1 /HPF (ref 0–3)
REASON FOR REJECTION: NORMAL
REJECTED TEST: NORMAL
SARS-COV-2 RDRP RESP QL NAA+PROBE: NOT DETECTED
SEGMENTED NEUTROPHILS ABSOLUTE COUNT: 25.4 K/CU MM
SEGMENTED NEUTROPHILS RELATIVE PERCENT: 76 % (ref 36–66)
SODIUM BLD-SCNC: 131 MMOL/L (ref 135–145)
SOURCE: NORMAL
SPECIFIC GRAVITY UA: <1.005 (ref 1–1.03)
TOTAL CK: 20 IU/L (ref 38–174)
TOTAL PROTEIN: 8.4 GM/DL (ref 6.4–8.2)
TRICHOMONAS: ABNORMAL /HPF
TROPONIN T: <0.01 NG/ML
TSH SERPL DL<=0.005 MIU/L-ACNC: 2.49 UIU/ML (ref 0.27–4.2)
UROBILINOGEN, URINE: 1 MG/DL (ref 0.2–1)
WBC # BLD: 33.4 K/CU MM (ref 4–10.5)
WBC UA: 3 /HPF (ref 0–2)

## 2023-06-07 PROCEDURE — 83880 ASSAY OF NATRIURETIC PEPTIDE: CPT

## 2023-06-07 PROCEDURE — 87086 URINE CULTURE/COLONY COUNT: CPT

## 2023-06-07 PROCEDURE — 6360000002 HC RX W HCPCS: Performed by: STUDENT IN AN ORGANIZED HEALTH CARE EDUCATION/TRAINING PROGRAM

## 2023-06-07 PROCEDURE — 85007 BL SMEAR W/DIFF WBC COUNT: CPT

## 2023-06-07 PROCEDURE — 6360000002 HC RX W HCPCS: Performed by: EMERGENCY MEDICINE

## 2023-06-07 PROCEDURE — 84443 ASSAY THYROID STIM HORMONE: CPT

## 2023-06-07 PROCEDURE — 84484 ASSAY OF TROPONIN QUANT: CPT

## 2023-06-07 PROCEDURE — 87040 BLOOD CULTURE FOR BACTERIA: CPT

## 2023-06-07 PROCEDURE — 2700000000 HC OXYGEN THERAPY PER DAY

## 2023-06-07 PROCEDURE — 96374 THER/PROPH/DIAG INJ IV PUSH: CPT

## 2023-06-07 PROCEDURE — 94664 DEMO&/EVAL PT USE INHALER: CPT

## 2023-06-07 PROCEDURE — 96375 TX/PRO/DX INJ NEW DRUG ADDON: CPT

## 2023-06-07 PROCEDURE — 87635 SARS-COV-2 COVID-19 AMP PRB: CPT

## 2023-06-07 PROCEDURE — 6370000000 HC RX 637 (ALT 250 FOR IP): Performed by: EMERGENCY MEDICINE

## 2023-06-07 PROCEDURE — 82542 COL CHROMOTOGRAPHY QUAL/QUAN: CPT

## 2023-06-07 PROCEDURE — 81001 URINALYSIS AUTO W/SCOPE: CPT

## 2023-06-07 PROCEDURE — 71045 X-RAY EXAM CHEST 1 VIEW: CPT

## 2023-06-07 PROCEDURE — 6360000004 HC RX CONTRAST MEDICATION: Performed by: EMERGENCY MEDICINE

## 2023-06-07 PROCEDURE — 80053 COMPREHEN METABOLIC PANEL: CPT

## 2023-06-07 PROCEDURE — 83605 ASSAY OF LACTIC ACID: CPT

## 2023-06-07 PROCEDURE — 2580000003 HC RX 258: Performed by: EMERGENCY MEDICINE

## 2023-06-07 PROCEDURE — 86140 C-REACTIVE PROTEIN: CPT

## 2023-06-07 PROCEDURE — 6370000000 HC RX 637 (ALT 250 FOR IP): Performed by: STUDENT IN AN ORGANIZED HEALTH CARE EDUCATION/TRAINING PROGRAM

## 2023-06-07 PROCEDURE — 83970 ASSAY OF PARATHORMONE: CPT

## 2023-06-07 PROCEDURE — 71275 CT ANGIOGRAPHY CHEST: CPT

## 2023-06-07 PROCEDURE — 82550 ASSAY OF CK (CPK): CPT

## 2023-06-07 PROCEDURE — 2060000000 HC ICU INTERMEDIATE R&B

## 2023-06-07 PROCEDURE — 2580000003 HC RX 258: Performed by: STUDENT IN AN ORGANIZED HEALTH CARE EDUCATION/TRAINING PROGRAM

## 2023-06-07 PROCEDURE — 99285 EMERGENCY DEPT VISIT HI MDM: CPT

## 2023-06-07 PROCEDURE — 85027 COMPLETE CBC AUTOMATED: CPT

## 2023-06-07 PROCEDURE — 93005 ELECTROCARDIOGRAM TRACING: CPT | Performed by: EMERGENCY MEDICINE

## 2023-06-07 PROCEDURE — 84145 PROCALCITONIN (PCT): CPT

## 2023-06-07 PROCEDURE — 94640 AIRWAY INHALATION TREATMENT: CPT

## 2023-06-07 RX ORDER — ONDANSETRON 2 MG/ML
4 INJECTION INTRAMUSCULAR; INTRAVENOUS EVERY 30 MIN PRN
Status: DISCONTINUED | OUTPATIENT
Start: 2023-06-07 | End: 2023-06-08

## 2023-06-07 RX ORDER — ENOXAPARIN SODIUM 100 MG/ML
30 INJECTION SUBCUTANEOUS DAILY
Status: DISCONTINUED | OUTPATIENT
Start: 2023-06-08 | End: 2023-06-16 | Stop reason: HOSPADM

## 2023-06-07 RX ORDER — 0.9 % SODIUM CHLORIDE 0.9 %
1000 INTRAVENOUS SOLUTION INTRAVENOUS ONCE
Status: COMPLETED | OUTPATIENT
Start: 2023-06-07 | End: 2023-06-07

## 2023-06-07 RX ORDER — METHYLPREDNISOLONE SODIUM SUCCINATE 125 MG/2ML
125 INJECTION, POWDER, LYOPHILIZED, FOR SOLUTION INTRAMUSCULAR; INTRAVENOUS ONCE
Status: COMPLETED | OUTPATIENT
Start: 2023-06-07 | End: 2023-06-07

## 2023-06-07 RX ORDER — SODIUM CHLORIDE 9 MG/ML
INJECTION, SOLUTION INTRAVENOUS PRN
Status: DISCONTINUED | OUTPATIENT
Start: 2023-06-07 | End: 2023-06-16 | Stop reason: HOSPADM

## 2023-06-07 RX ORDER — ASPIRIN 81 MG/1
324 TABLET, CHEWABLE ORAL ONCE
Status: COMPLETED | OUTPATIENT
Start: 2023-06-07 | End: 2023-06-07

## 2023-06-07 RX ORDER — ALBUTEROL SULFATE 90 UG/1
2 AEROSOL, METERED RESPIRATORY (INHALATION) ONCE
Status: COMPLETED | OUTPATIENT
Start: 2023-06-07 | End: 2023-06-07

## 2023-06-07 RX ORDER — SODIUM CHLORIDE 0.9 % (FLUSH) 0.9 %
5-40 SYRINGE (ML) INJECTION PRN
Status: DISCONTINUED | OUTPATIENT
Start: 2023-06-07 | End: 2023-06-16 | Stop reason: HOSPADM

## 2023-06-07 RX ORDER — ACETAMINOPHEN 325 MG/1
650 TABLET ORAL EVERY 6 HOURS PRN
Status: DISCONTINUED | OUTPATIENT
Start: 2023-06-07 | End: 2023-06-16 | Stop reason: HOSPADM

## 2023-06-07 RX ORDER — MAGNESIUM SULFATE IN WATER 40 MG/ML
2000 INJECTION, SOLUTION INTRAVENOUS PRN
Status: DISCONTINUED | OUTPATIENT
Start: 2023-06-07 | End: 2023-06-10

## 2023-06-07 RX ORDER — SODIUM CHLORIDE 9 MG/ML
INJECTION, SOLUTION INTRAVENOUS CONTINUOUS
Status: DISCONTINUED | OUTPATIENT
Start: 2023-06-07 | End: 2023-06-14

## 2023-06-07 RX ORDER — ACETAMINOPHEN 650 MG/1
650 SUPPOSITORY RECTAL EVERY 6 HOURS PRN
Status: DISCONTINUED | OUTPATIENT
Start: 2023-06-07 | End: 2023-06-16 | Stop reason: HOSPADM

## 2023-06-07 RX ORDER — SODIUM CHLORIDE 0.9 % (FLUSH) 0.9 %
5-40 SYRINGE (ML) INJECTION 2 TIMES DAILY
Status: DISCONTINUED | OUTPATIENT
Start: 2023-06-08 | End: 2023-06-16 | Stop reason: HOSPADM

## 2023-06-07 RX ORDER — POTASSIUM CHLORIDE 20 MEQ/1
40 TABLET, EXTENDED RELEASE ORAL PRN
Status: DISCONTINUED | OUTPATIENT
Start: 2023-06-07 | End: 2023-06-10

## 2023-06-07 RX ORDER — POTASSIUM CHLORIDE 20 MEQ/1
60 TABLET, EXTENDED RELEASE ORAL ONCE
Status: COMPLETED | OUTPATIENT
Start: 2023-06-07 | End: 2023-06-07

## 2023-06-07 RX ORDER — POTASSIUM CHLORIDE 7.45 MG/ML
10 INJECTION INTRAVENOUS
Status: COMPLETED | OUTPATIENT
Start: 2023-06-07 | End: 2023-06-07

## 2023-06-07 RX ORDER — POTASSIUM CHLORIDE 7.45 MG/ML
10 INJECTION INTRAVENOUS PRN
Status: DISCONTINUED | OUTPATIENT
Start: 2023-06-07 | End: 2023-06-10

## 2023-06-07 RX ORDER — OXYCODONE HYDROCHLORIDE 5 MG/1
5 TABLET ORAL EVERY 4 HOURS PRN
Status: DISCONTINUED | OUTPATIENT
Start: 2023-06-07 | End: 2023-06-08

## 2023-06-07 RX ORDER — MORPHINE SULFATE 2 MG/ML
2 INJECTION, SOLUTION INTRAMUSCULAR; INTRAVENOUS EVERY 30 MIN PRN
Status: DISCONTINUED | OUTPATIENT
Start: 2023-06-07 | End: 2023-06-08

## 2023-06-07 RX ORDER — LANOLIN ALCOHOL/MO/W.PET/CERES
3 CREAM (GRAM) TOPICAL NIGHTLY PRN
Status: DISCONTINUED | OUTPATIENT
Start: 2023-06-07 | End: 2023-06-16 | Stop reason: HOSPADM

## 2023-06-07 RX ADMIN — METHYLPREDNISOLONE SODIUM SUCCINATE 125 MG: 125 INJECTION, POWDER, FOR SOLUTION INTRAMUSCULAR; INTRAVENOUS at 17:29

## 2023-06-07 RX ADMIN — ALBUTEROL SULFATE 2 PUFF: 90 AEROSOL, METERED RESPIRATORY (INHALATION) at 16:30

## 2023-06-07 RX ADMIN — CEFEPIME 2000 MG: 2 INJECTION, POWDER, FOR SOLUTION INTRAVENOUS at 17:31

## 2023-06-07 RX ADMIN — SODIUM CHLORIDE 1000 ML: 9 INJECTION, SOLUTION INTRAVENOUS at 17:34

## 2023-06-07 RX ADMIN — POTASSIUM CHLORIDE 10 MEQ: 7.46 INJECTION, SOLUTION INTRAVENOUS at 22:37

## 2023-06-07 RX ADMIN — POTASSIUM CHLORIDE 10 MEQ: 7.46 INJECTION, SOLUTION INTRAVENOUS at 20:27

## 2023-06-07 RX ADMIN — IOPAMIDOL 75 ML: 755 INJECTION, SOLUTION INTRAVENOUS at 17:51

## 2023-06-07 RX ADMIN — SODIUM CHLORIDE: 9 INJECTION, SOLUTION INTRAVENOUS at 23:40

## 2023-06-07 RX ADMIN — OXYCODONE 5 MG: 5 TABLET ORAL at 22:06

## 2023-06-07 RX ADMIN — ALBUTEROL SULFATE 2 PUFF: 90 AEROSOL, METERED RESPIRATORY (INHALATION) at 16:29

## 2023-06-07 RX ADMIN — ASPIRIN 324 MG: 81 TABLET, CHEWABLE ORAL at 17:29

## 2023-06-07 RX ADMIN — ALBUTEROL SULFATE 2 PUFF: 90 AEROSOL, METERED RESPIRATORY (INHALATION) at 16:33

## 2023-06-07 RX ADMIN — POTASSIUM CHLORIDE 10 MEQ: 7.46 INJECTION, SOLUTION INTRAVENOUS at 23:42

## 2023-06-07 RX ADMIN — POTASSIUM CHLORIDE 60 MEQ: 1500 TABLET, EXTENDED RELEASE ORAL at 20:10

## 2023-06-07 RX ADMIN — VANCOMYCIN HYDROCHLORIDE 1250 MG: 1.25 INJECTION, POWDER, LYOPHILIZED, FOR SOLUTION INTRAVENOUS at 19:28

## 2023-06-07 ASSESSMENT — ENCOUNTER SYMPTOMS
SHORTNESS OF BREATH: 1
EYES NEGATIVE: 1
ALLERGIC/IMMUNOLOGIC NEGATIVE: 1
GASTROINTESTINAL NEGATIVE: 1

## 2023-06-07 ASSESSMENT — PAIN SCALES - GENERAL
PAINLEVEL_OUTOF10: 9
PAINLEVEL_OUTOF10: 5

## 2023-06-08 ENCOUNTER — APPOINTMENT (OUTPATIENT)
Dept: MRI IMAGING | Age: 58
DRG: 720 | End: 2023-06-08
Payer: COMMERCIAL

## 2023-06-08 PROBLEM — E43 SEVERE MALNUTRITION (HCC): Status: ACTIVE | Noted: 2023-01-01

## 2023-06-08 LAB
ALBUMIN SERPL-MCNC: 2.6 GM/DL (ref 3.4–5)
ALP BLD-CCNC: 91 IU/L (ref 40–129)
ALT SERPL-CCNC: 12 U/L (ref 10–40)
ANION GAP SERPL CALCULATED.3IONS-SCNC: 9 MMOL/L (ref 4–16)
AST SERPL-CCNC: 10 IU/L (ref 15–37)
BASOPHILS ABSOLUTE: 0 K/CU MM
BASOPHILS RELATIVE PERCENT: 0.1 % (ref 0–1)
BILIRUB SERPL-MCNC: 0.4 MG/DL (ref 0–1)
BUN SERPL-MCNC: 16 MG/DL (ref 6–23)
CALCIUM SERPL-MCNC: 12.8 MG/DL (ref 8.3–10.6)
CHLORIDE BLD-SCNC: 105 MMOL/L (ref 99–110)
CO2: 22 MMOL/L (ref 21–32)
CREAT SERPL-MCNC: 1.2 MG/DL (ref 0.9–1.3)
DIFFERENTIAL TYPE: ABNORMAL
EOSINOPHILS ABSOLUTE: 0 K/CU MM
EOSINOPHILS RELATIVE PERCENT: 0 % (ref 0–3)
FERRITIN: 1780 NG/ML (ref 30–400)
FOLATE SERPL-MCNC: 4.1 NG/ML (ref 3.1–17.5)
GFR SERPL CREATININE-BSD FRML MDRD: >60 ML/MIN/1.73M2
GLUCOSE SERPL-MCNC: 113 MG/DL (ref 70–99)
HAV IGM SERPL QL IA: NON REACTIVE
HBV CORE IGM SERPL QL IA: NON REACTIVE
HBV SURFACE AG SERPL QL IA: NON REACTIVE
HCT VFR BLD CALC: 29.2 % (ref 42–52)
HCV AB SERPL QL IA: ABNORMAL
HEMOGLOBIN: 9.3 GM/DL (ref 13.5–18)
IMMATURE NEUTROPHIL %: 0.8 % (ref 0–0.43)
INR BLD: 1.31 INDEX
IRON: 30 UG/DL (ref 59–158)
LYMPHOCYTES ABSOLUTE: 1 K/CU MM
LYMPHOCYTES RELATIVE PERCENT: 4.8 % (ref 24–44)
MCH RBC QN AUTO: 28 PG (ref 27–31)
MCHC RBC AUTO-ENTMCNC: 31.8 % (ref 32–36)
MCV RBC AUTO: 88 FL (ref 78–100)
MONOCYTES ABSOLUTE: 0.5 K/CU MM
MONOCYTES RELATIVE PERCENT: 2.5 % (ref 0–4)
NUCLEATED RBC %: 0 %
PCT TRANSFERRIN: 19 % (ref 10–44)
PDW BLD-RTO: 13.3 % (ref 11.7–14.9)
PLATELET # BLD: 320 K/CU MM (ref 140–440)
PMV BLD AUTO: 9 FL (ref 7.5–11.1)
POTASSIUM SERPL-SCNC: 4.1 MMOL/L (ref 3.5–5.1)
PROTHROMBIN TIME: 16.6 SECONDS (ref 11.7–14.5)
RBC # BLD: 3.32 M/CU MM (ref 4.6–6.2)
SEGMENTED NEUTROPHILS ABSOLUTE COUNT: 19.5 K/CU MM
SEGMENTED NEUTROPHILS RELATIVE PERCENT: 91.8 % (ref 36–66)
SODIUM BLD-SCNC: 136 MMOL/L (ref 135–145)
TOTAL IMMATURE NEUTOROPHIL: 0.17 K/CU MM
TOTAL IRON BINDING CAPACITY: 161 UG/DL (ref 250–450)
TOTAL NUCLEATED RBC: 0 K/CU MM
TOTAL PROTEIN: 6.1 GM/DL (ref 6.4–8.2)
UNSATURATED IRON BINDING CAPACITY: 131 UG/DL (ref 110–370)
VITAMIN B-12: 586.9 PG/ML (ref 211–911)
WBC # BLD: 21.3 K/CU MM (ref 4–10.5)

## 2023-06-08 PROCEDURE — A9579 GAD-BASE MR CONTRAST NOS,1ML: HCPCS | Performed by: INTERNAL MEDICINE

## 2023-06-08 PROCEDURE — 94640 AIRWAY INHALATION TREATMENT: CPT

## 2023-06-08 PROCEDURE — 82607 VITAMIN B-12: CPT

## 2023-06-08 PROCEDURE — 97116 GAIT TRAINING THERAPY: CPT

## 2023-06-08 PROCEDURE — 6370000000 HC RX 637 (ALT 250 FOR IP): Performed by: STUDENT IN AN ORGANIZED HEALTH CARE EDUCATION/TRAINING PROGRAM

## 2023-06-08 PROCEDURE — 85610 PROTHROMBIN TIME: CPT

## 2023-06-08 PROCEDURE — 6360000004 HC RX CONTRAST MEDICATION: Performed by: INTERNAL MEDICINE

## 2023-06-08 PROCEDURE — 70553 MRI BRAIN STEM W/O & W/DYE: CPT

## 2023-06-08 PROCEDURE — 6370000000 HC RX 637 (ALT 250 FOR IP): Performed by: INTERNAL MEDICINE

## 2023-06-08 PROCEDURE — 6360000002 HC RX W HCPCS: Performed by: INTERNAL MEDICINE

## 2023-06-08 PROCEDURE — 83550 IRON BINDING TEST: CPT

## 2023-06-08 PROCEDURE — 99223 1ST HOSP IP/OBS HIGH 75: CPT | Performed by: INTERNAL MEDICINE

## 2023-06-08 PROCEDURE — 2700000000 HC OXYGEN THERAPY PER DAY

## 2023-06-08 PROCEDURE — 2060000000 HC ICU INTERMEDIATE R&B

## 2023-06-08 PROCEDURE — 97162 PT EVAL MOD COMPLEX 30 MIN: CPT

## 2023-06-08 PROCEDURE — 36415 COLL VENOUS BLD VENIPUNCTURE: CPT

## 2023-06-08 PROCEDURE — 85025 COMPLETE CBC W/AUTO DIFF WBC: CPT

## 2023-06-08 PROCEDURE — 6360000002 HC RX W HCPCS: Performed by: STUDENT IN AN ORGANIZED HEALTH CARE EDUCATION/TRAINING PROGRAM

## 2023-06-08 PROCEDURE — 83540 ASSAY OF IRON: CPT

## 2023-06-08 PROCEDURE — 80053 COMPREHEN METABOLIC PANEL: CPT

## 2023-06-08 PROCEDURE — 82746 ASSAY OF FOLIC ACID SERUM: CPT

## 2023-06-08 PROCEDURE — 80074 ACUTE HEPATITIS PANEL: CPT

## 2023-06-08 PROCEDURE — 82728 ASSAY OF FERRITIN: CPT

## 2023-06-08 PROCEDURE — 2580000003 HC RX 258: Performed by: STUDENT IN AN ORGANIZED HEALTH CARE EDUCATION/TRAINING PROGRAM

## 2023-06-08 PROCEDURE — 94761 N-INVAS EAR/PLS OXIMETRY MLT: CPT

## 2023-06-08 RX ORDER — MIRTAZAPINE 15 MG/1
15 TABLET, ORALLY DISINTEGRATING ORAL NIGHTLY
Status: DISCONTINUED | OUTPATIENT
Start: 2023-06-08 | End: 2023-06-16 | Stop reason: HOSPADM

## 2023-06-08 RX ORDER — BUDESONIDE AND FORMOTEROL FUMARATE DIHYDRATE 160; 4.5 UG/1; UG/1
2 AEROSOL RESPIRATORY (INHALATION) 2 TIMES DAILY
Status: DISCONTINUED | OUTPATIENT
Start: 2023-06-08 | End: 2023-06-16 | Stop reason: HOSPADM

## 2023-06-08 RX ORDER — OXYCODONE HYDROCHLORIDE AND ACETAMINOPHEN 5; 325 MG/1; MG/1
1 TABLET ORAL EVERY 8 HOURS PRN
Status: DISCONTINUED | OUTPATIENT
Start: 2023-06-08 | End: 2023-06-15

## 2023-06-08 RX ORDER — AMLODIPINE BESYLATE 10 MG/1
10 TABLET ORAL DAILY
Status: DISCONTINUED | OUTPATIENT
Start: 2023-06-08 | End: 2023-06-09

## 2023-06-08 RX ORDER — CLOPIDOGREL BISULFATE 75 MG/1
75 TABLET ORAL DAILY
Status: DISCONTINUED | OUTPATIENT
Start: 2023-06-08 | End: 2023-06-16 | Stop reason: HOSPADM

## 2023-06-08 RX ORDER — ALBUTEROL SULFATE 90 UG/1
2 AEROSOL, METERED RESPIRATORY (INHALATION) EVERY 6 HOURS PRN
Status: DISCONTINUED | OUTPATIENT
Start: 2023-06-08 | End: 2023-06-16 | Stop reason: HOSPADM

## 2023-06-08 RX ORDER — ATORVASTATIN CALCIUM 40 MG/1
40 TABLET, FILM COATED ORAL EVERY EVENING
Status: DISCONTINUED | OUTPATIENT
Start: 2023-06-08 | End: 2023-06-16 | Stop reason: HOSPADM

## 2023-06-08 RX ADMIN — BUDESONIDE AND FORMOTEROL FUMARATE DIHYDRATE 2 PUFF: 160; 4.5 AEROSOL RESPIRATORY (INHALATION) at 20:53

## 2023-06-08 RX ADMIN — SODIUM CHLORIDE: 9 INJECTION, SOLUTION INTRAVENOUS at 16:50

## 2023-06-08 RX ADMIN — HYDROMORPHONE HYDROCHLORIDE 0.25 MG: 1 INJECTION, SOLUTION INTRAMUSCULAR; INTRAVENOUS; SUBCUTANEOUS at 19:45

## 2023-06-08 RX ADMIN — SODIUM CHLORIDE, PRESERVATIVE FREE 10 ML: 5 INJECTION INTRAVENOUS at 22:00

## 2023-06-08 RX ADMIN — GADOTERIDOL 15 ML: 279.3 INJECTION, SOLUTION INTRAVENOUS at 14:27

## 2023-06-08 RX ADMIN — PIPERACILLIN AND TAZOBACTAM 3375 MG: 3; .375 INJECTION, POWDER, LYOPHILIZED, FOR SOLUTION INTRAVENOUS at 10:11

## 2023-06-08 RX ADMIN — SODIUM CHLORIDE, PRESERVATIVE FREE 10 ML: 5 INJECTION INTRAVENOUS at 08:29

## 2023-06-08 RX ADMIN — ATORVASTATIN CALCIUM 40 MG: 40 TABLET, FILM COATED ORAL at 19:45

## 2023-06-08 RX ADMIN — OXYCODONE AND ACETAMINOPHEN 1 TABLET: 5; 325 TABLET ORAL at 08:29

## 2023-06-08 RX ADMIN — PIPERACILLIN AND TAZOBACTAM 3375 MG: 3; .375 INJECTION, POWDER, LYOPHILIZED, FOR SOLUTION INTRAVENOUS at 16:49

## 2023-06-08 RX ADMIN — MIRTAZAPINE 15 MG: 15 TABLET, ORALLY DISINTEGRATING ORAL at 19:45

## 2023-06-08 RX ADMIN — PIPERACILLIN AND TAZOBACTAM 3375 MG: 3; .375 INJECTION, POWDER, LYOPHILIZED, FOR SOLUTION INTRAVENOUS at 06:34

## 2023-06-08 RX ADMIN — SODIUM CHLORIDE: 9 INJECTION, SOLUTION INTRAVENOUS at 06:35

## 2023-06-08 RX ADMIN — BUDESONIDE AND FORMOTEROL FUMARATE DIHYDRATE 2 PUFF: 160; 4.5 AEROSOL RESPIRATORY (INHALATION) at 07:23

## 2023-06-08 RX ADMIN — Medication 3 MG: at 21:38

## 2023-06-08 RX ADMIN — OXYCODONE AND ACETAMINOPHEN 1 TABLET: 5; 325 TABLET ORAL at 16:56

## 2023-06-08 ASSESSMENT — PAIN - FUNCTIONAL ASSESSMENT
PAIN_FUNCTIONAL_ASSESSMENT: ACTIVITIES ARE NOT PREVENTED
PAIN_FUNCTIONAL_ASSESSMENT: ACTIVITIES ARE NOT PREVENTED

## 2023-06-08 ASSESSMENT — PAIN DESCRIPTION - DESCRIPTORS
DESCRIPTORS: ACHING
DESCRIPTORS: ACHING

## 2023-06-08 ASSESSMENT — PAIN DESCRIPTION - ORIENTATION
ORIENTATION: RIGHT
ORIENTATION: RIGHT

## 2023-06-08 ASSESSMENT — PAIN DESCRIPTION - LOCATION
LOCATION: ABDOMEN
LOCATION: ABDOMEN

## 2023-06-08 ASSESSMENT — PAIN SCALES - GENERAL
PAINLEVEL_OUTOF10: 7
PAINLEVEL_OUTOF10: 7

## 2023-06-08 ASSESSMENT — LIFESTYLE VARIABLES
HOW MANY STANDARD DRINKS CONTAINING ALCOHOL DO YOU HAVE ON A TYPICAL DAY: 1 OR 2
HOW OFTEN DO YOU HAVE A DRINK CONTAINING ALCOHOL: MONTHLY OR LESS

## 2023-06-09 LAB
25(OH)D3 SERPL-MCNC: <6 NG/ML
ANION GAP SERPL CALCULATED.3IONS-SCNC: 11 MMOL/L (ref 4–16)
BASOPHILS ABSOLUTE: 0 K/CU MM
BASOPHILS RELATIVE PERCENT: 0.2 % (ref 0–1)
BUN SERPL-MCNC: 18 MG/DL (ref 6–23)
CALCIUM SERPL-MCNC: 12.3 MG/DL (ref 8.3–10.6)
CHLORIDE BLD-SCNC: 105 MMOL/L (ref 99–110)
CO2: 20 MMOL/L (ref 21–32)
CREAT SERPL-MCNC: 1.2 MG/DL (ref 0.9–1.3)
CULTURE: NORMAL
DIFFERENTIAL TYPE: ABNORMAL
EKG ATRIAL RATE: 118 BPM
EKG DIAGNOSIS: NORMAL
EKG P AXIS: 74 DEGREES
EKG P-R INTERVAL: 142 MS
EKG Q-T INTERVAL: 330 MS
EKG QRS DURATION: 92 MS
EKG QTC CALCULATION (BAZETT): 462 MS
EKG R AXIS: 72 DEGREES
EKG T AXIS: 47 DEGREES
EKG VENTRICULAR RATE: 118 BPM
EOSINOPHILS ABSOLUTE: 0.1 K/CU MM
EOSINOPHILS RELATIVE PERCENT: 0.4 % (ref 0–3)
GFR SERPL CREATININE-BSD FRML MDRD: >60 ML/MIN/1.73M2
GLUCOSE SERPL-MCNC: 67 MG/DL (ref 70–99)
HCT VFR BLD CALC: 32.6 % (ref 42–52)
HEMOGLOBIN: 9.9 GM/DL (ref 13.5–18)
IMMATURE NEUTROPHIL %: 0.7 % (ref 0–0.43)
LYMPHOCYTES ABSOLUTE: 2 K/CU MM
LYMPHOCYTES RELATIVE PERCENT: 7.8 % (ref 24–44)
Lab: NORMAL
MCH RBC QN AUTO: 28 PG (ref 27–31)
MCHC RBC AUTO-ENTMCNC: 30.4 % (ref 32–36)
MCV RBC AUTO: 92.4 FL (ref 78–100)
MONOCYTES ABSOLUTE: 2.1 K/CU MM
MONOCYTES RELATIVE PERCENT: 8 % (ref 0–4)
NUCLEATED RBC %: 0 %
PDW BLD-RTO: 13.4 % (ref 11.7–14.9)
PLATELET # BLD: 340 K/CU MM (ref 140–440)
PMV BLD AUTO: 9.1 FL (ref 7.5–11.1)
POTASSIUM SERPL-SCNC: 4 MMOL/L (ref 3.5–5.1)
RBC # BLD: 3.53 M/CU MM (ref 4.6–6.2)
SEGMENTED NEUTROPHILS ABSOLUTE COUNT: 21.6 K/CU MM
SEGMENTED NEUTROPHILS RELATIVE PERCENT: 82.9 % (ref 36–66)
SODIUM BLD-SCNC: 136 MMOL/L (ref 135–145)
SPECIMEN: NORMAL
TOTAL IMMATURE NEUTOROPHIL: 0.18 K/CU MM
TOTAL NUCLEATED RBC: 0 K/CU MM
WBC # BLD: 26 K/CU MM (ref 4–10.5)

## 2023-06-09 PROCEDURE — 85025 COMPLETE CBC W/AUTO DIFF WBC: CPT

## 2023-06-09 PROCEDURE — 80048 BASIC METABOLIC PNL TOTAL CA: CPT

## 2023-06-09 PROCEDURE — 97530 THERAPEUTIC ACTIVITIES: CPT

## 2023-06-09 PROCEDURE — 6370000000 HC RX 637 (ALT 250 FOR IP): Performed by: INTERNAL MEDICINE

## 2023-06-09 PROCEDURE — 36415 COLL VENOUS BLD VENIPUNCTURE: CPT

## 2023-06-09 PROCEDURE — 84155 ASSAY OF PROTEIN SERUM: CPT

## 2023-06-09 PROCEDURE — 94640 AIRWAY INHALATION TREATMENT: CPT

## 2023-06-09 PROCEDURE — 6360000002 HC RX W HCPCS: Performed by: INTERNAL MEDICINE

## 2023-06-09 PROCEDURE — 6370000000 HC RX 637 (ALT 250 FOR IP): Performed by: STUDENT IN AN ORGANIZED HEALTH CARE EDUCATION/TRAINING PROGRAM

## 2023-06-09 PROCEDURE — 99232 SBSQ HOSP IP/OBS MODERATE 35: CPT | Performed by: INTERNAL MEDICINE

## 2023-06-09 PROCEDURE — 2580000003 HC RX 258: Performed by: STUDENT IN AN ORGANIZED HEALTH CARE EDUCATION/TRAINING PROGRAM

## 2023-06-09 PROCEDURE — 94761 N-INVAS EAR/PLS OXIMETRY MLT: CPT

## 2023-06-09 PROCEDURE — 93010 ELECTROCARDIOGRAM REPORT: CPT | Performed by: INTERNAL MEDICINE

## 2023-06-09 PROCEDURE — 82306 VITAMIN D 25 HYDROXY: CPT

## 2023-06-09 PROCEDURE — 84165 PROTEIN E-PHORESIS SERUM: CPT

## 2023-06-09 PROCEDURE — 6360000002 HC RX W HCPCS: Performed by: STUDENT IN AN ORGANIZED HEALTH CARE EDUCATION/TRAINING PROGRAM

## 2023-06-09 PROCEDURE — 83883 ASSAY NEPHELOMETRY NOT SPEC: CPT

## 2023-06-09 PROCEDURE — 2060000000 HC ICU INTERMEDIATE R&B

## 2023-06-09 PROCEDURE — 97166 OT EVAL MOD COMPLEX 45 MIN: CPT

## 2023-06-09 RX ORDER — CALCITONIN SALMON 200 [IU]/.09ML
1 SPRAY, METERED NASAL ONCE
Status: DISCONTINUED | OUTPATIENT
Start: 2023-06-09 | End: 2023-06-09 | Stop reason: ALTCHOICE

## 2023-06-09 RX ORDER — CALCITONIN SALMON 200 [IU]/.09ML
1 SPRAY, METERED NASAL ONCE
Status: DISCONTINUED | OUTPATIENT
Start: 2023-06-10 | End: 2023-06-10

## 2023-06-09 RX ORDER — CALCITONIN SALMON 200 [USP'U]/ML
4 INJECTION, SOLUTION INTRAMUSCULAR; SUBCUTANEOUS ONCE
Status: DISCONTINUED | OUTPATIENT
Start: 2023-06-09 | End: 2023-06-09

## 2023-06-09 RX ADMIN — SODIUM CHLORIDE, PRESERVATIVE FREE 10 ML: 5 INJECTION INTRAVENOUS at 21:37

## 2023-06-09 RX ADMIN — SODIUM CHLORIDE: 9 INJECTION, SOLUTION INTRAVENOUS at 02:26

## 2023-06-09 RX ADMIN — BUDESONIDE AND FORMOTEROL FUMARATE DIHYDRATE 2 PUFF: 160; 4.5 AEROSOL RESPIRATORY (INHALATION) at 07:45

## 2023-06-09 RX ADMIN — HYDROMORPHONE HYDROCHLORIDE 0.25 MG: 1 INJECTION, SOLUTION INTRAMUSCULAR; INTRAVENOUS; SUBCUTANEOUS at 05:52

## 2023-06-09 RX ADMIN — PIPERACILLIN AND TAZOBACTAM 3375 MG: 3; .375 INJECTION, POWDER, LYOPHILIZED, FOR SOLUTION INTRAVENOUS at 17:11

## 2023-06-09 RX ADMIN — PIPERACILLIN AND TAZOBACTAM 3375 MG: 3; .375 INJECTION, POWDER, LYOPHILIZED, FOR SOLUTION INTRAVENOUS at 09:18

## 2023-06-09 RX ADMIN — PIPERACILLIN AND TAZOBACTAM 3375 MG: 3; .375 INJECTION, POWDER, LYOPHILIZED, FOR SOLUTION INTRAVENOUS at 00:53

## 2023-06-09 RX ADMIN — MIRTAZAPINE 15 MG: 15 TABLET, ORALLY DISINTEGRATING ORAL at 21:37

## 2023-06-09 RX ADMIN — ATORVASTATIN CALCIUM 40 MG: 40 TABLET, FILM COATED ORAL at 21:37

## 2023-06-09 RX ADMIN — HYDROMORPHONE HYDROCHLORIDE 0.25 MG: 1 INJECTION, SOLUTION INTRAMUSCULAR; INTRAVENOUS; SUBCUTANEOUS at 17:10

## 2023-06-09 ASSESSMENT — ENCOUNTER SYMPTOMS
WHEEZING: 0
COUGH: 1
SHORTNESS OF BREATH: 1
VOMITING: 0
SINUS PRESSURE: 0
COLOR CHANGE: 0
SORE THROAT: 0
CHEST TIGHTNESS: 0
ABDOMINAL PAIN: 0
DIARRHEA: 0
BACK PAIN: 0
SINUS PAIN: 0
VOICE CHANGE: 0
CONSTIPATION: 0
NAUSEA: 0
TROUBLE SWALLOWING: 0

## 2023-06-10 LAB
ALBUMIN SERPL-MCNC: 2.4 GM/DL (ref 3.4–5)
AMMONIA: 35 UMOL/L (ref 16–60)
ANION GAP SERPL CALCULATED.3IONS-SCNC: 7 MMOL/L (ref 4–16)
ANION GAP SERPL CALCULATED.3IONS-SCNC: 9 MMOL/L (ref 4–16)
BASOPHILS ABSOLUTE: 0.1 K/CU MM
BASOPHILS RELATIVE PERCENT: 0.2 % (ref 0–1)
BUN SERPL-MCNC: 13 MG/DL (ref 6–23)
BUN SERPL-MCNC: 13 MG/DL (ref 6–23)
CALCIUM SERPL-MCNC: 10.5 MG/DL (ref 8.3–10.6)
CALCIUM SERPL-MCNC: 11.1 MG/DL (ref 8.3–10.6)
CHLORIDE BLD-SCNC: 102 MMOL/L (ref 99–110)
CHLORIDE BLD-SCNC: 106 MMOL/L (ref 99–110)
CO2: 23 MMOL/L (ref 21–32)
CO2: 24 MMOL/L (ref 21–32)
CREAT SERPL-MCNC: 0.9 MG/DL (ref 0.9–1.3)
CREAT SERPL-MCNC: 0.9 MG/DL (ref 0.9–1.3)
DIFFERENTIAL TYPE: ABNORMAL
EOSINOPHILS ABSOLUTE: 0.1 K/CU MM
EOSINOPHILS RELATIVE PERCENT: 0.3 % (ref 0–3)
GFR SERPL CREATININE-BSD FRML MDRD: >60 ML/MIN/1.73M2
GFR SERPL CREATININE-BSD FRML MDRD: >60 ML/MIN/1.73M2
GLUCOSE BLD-MCNC: 155 MG/DL (ref 70–99)
GLUCOSE BLD-MCNC: 65 MG/DL (ref 70–99)
GLUCOSE SERPL-MCNC: 74 MG/DL (ref 70–99)
GLUCOSE SERPL-MCNC: 96 MG/DL (ref 70–99)
HCT VFR BLD CALC: 25 % (ref 42–52)
HEMOGLOBIN: 7.9 GM/DL (ref 13.5–18)
IMMATURE NEUTROPHIL %: 0.6 % (ref 0–0.43)
LYMPHOCYTES ABSOLUTE: 2.2 K/CU MM
LYMPHOCYTES RELATIVE PERCENT: 7.9 % (ref 24–44)
MAGNESIUM: 1.5 MG/DL (ref 1.8–2.4)
MCH RBC QN AUTO: 27.7 PG (ref 27–31)
MCHC RBC AUTO-ENTMCNC: 31.6 % (ref 32–36)
MCV RBC AUTO: 87.7 FL (ref 78–100)
MONOCYTES ABSOLUTE: 2.3 K/CU MM
MONOCYTES RELATIVE PERCENT: 8.3 % (ref 0–4)
NUCLEATED RBC %: 0 %
PDW BLD-RTO: 13.4 % (ref 11.7–14.9)
PHOSPHORUS: 0.9 MG/DL (ref 2.5–4.9)
PLATELET # BLD: 328 K/CU MM (ref 140–440)
PMV BLD AUTO: 8.6 FL (ref 7.5–11.1)
POTASSIUM SERPL-SCNC: 2.9 MMOL/L (ref 3.5–5.1)
POTASSIUM SERPL-SCNC: 4 MMOL/L (ref 3.5–5.1)
RBC # BLD: 2.85 M/CU MM (ref 4.6–6.2)
SEGMENTED NEUTROPHILS ABSOLUTE COUNT: 22.8 K/CU MM
SEGMENTED NEUTROPHILS RELATIVE PERCENT: 82.7 % (ref 36–66)
SODIUM BLD-SCNC: 135 MMOL/L (ref 135–145)
SODIUM BLD-SCNC: 136 MMOL/L (ref 135–145)
TOTAL IMMATURE NEUTOROPHIL: 0.16 K/CU MM
TOTAL NUCLEATED RBC: 0 K/CU MM
WBC # BLD: 27.5 K/CU MM (ref 4–10.5)

## 2023-06-10 PROCEDURE — 6360000002 HC RX W HCPCS: Performed by: STUDENT IN AN ORGANIZED HEALTH CARE EDUCATION/TRAINING PROGRAM

## 2023-06-10 PROCEDURE — 83735 ASSAY OF MAGNESIUM: CPT

## 2023-06-10 PROCEDURE — 6370000000 HC RX 637 (ALT 250 FOR IP): Performed by: INTERNAL MEDICINE

## 2023-06-10 PROCEDURE — 94640 AIRWAY INHALATION TREATMENT: CPT

## 2023-06-10 PROCEDURE — 85025 COMPLETE CBC W/AUTO DIFF WBC: CPT

## 2023-06-10 PROCEDURE — 6370000000 HC RX 637 (ALT 250 FOR IP): Performed by: STUDENT IN AN ORGANIZED HEALTH CARE EDUCATION/TRAINING PROGRAM

## 2023-06-10 PROCEDURE — 36415 COLL VENOUS BLD VENIPUNCTURE: CPT

## 2023-06-10 PROCEDURE — 80069 RENAL FUNCTION PANEL: CPT

## 2023-06-10 PROCEDURE — 2580000003 HC RX 258: Performed by: STUDENT IN AN ORGANIZED HEALTH CARE EDUCATION/TRAINING PROGRAM

## 2023-06-10 PROCEDURE — 82140 ASSAY OF AMMONIA: CPT

## 2023-06-10 PROCEDURE — 2500000003 HC RX 250 WO HCPCS: Performed by: INTERNAL MEDICINE

## 2023-06-10 PROCEDURE — 2060000000 HC ICU INTERMEDIATE R&B

## 2023-06-10 PROCEDURE — 6360000002 HC RX W HCPCS: Performed by: EMERGENCY MEDICINE

## 2023-06-10 PROCEDURE — 6360000002 HC RX W HCPCS: Performed by: INTERNAL MEDICINE

## 2023-06-10 PROCEDURE — 82962 GLUCOSE BLOOD TEST: CPT

## 2023-06-10 PROCEDURE — 94761 N-INVAS EAR/PLS OXIMETRY MLT: CPT

## 2023-06-10 PROCEDURE — 2580000003 HC RX 258: Performed by: INTERNAL MEDICINE

## 2023-06-10 PROCEDURE — 80048 BASIC METABOLIC PNL TOTAL CA: CPT

## 2023-06-10 RX ORDER — POTASSIUM CHLORIDE 20 MEQ/1
40 TABLET, EXTENDED RELEASE ORAL ONCE
Status: COMPLETED | OUTPATIENT
Start: 2023-06-10 | End: 2023-06-10

## 2023-06-10 RX ORDER — MAGNESIUM SULFATE IN WATER 40 MG/ML
2000 INJECTION, SOLUTION INTRAVENOUS ONCE
Status: DISCONTINUED | OUTPATIENT
Start: 2023-06-10 | End: 2023-06-12

## 2023-06-10 RX ORDER — MAGNESIUM SULFATE IN WATER 40 MG/ML
2000 INJECTION, SOLUTION INTRAVENOUS ONCE
Status: COMPLETED | OUTPATIENT
Start: 2023-06-10 | End: 2023-06-10

## 2023-06-10 RX ORDER — POTASSIUM CHLORIDE 20 MEQ/1
40 TABLET, EXTENDED RELEASE ORAL ONCE
Status: DISCONTINUED | OUTPATIENT
Start: 2023-06-10 | End: 2023-06-10

## 2023-06-10 RX ORDER — POTASSIUM CHLORIDE 20 MEQ/1
40 TABLET, EXTENDED RELEASE ORAL EVERY 4 HOURS
Status: DISCONTINUED | OUTPATIENT
Start: 2023-06-10 | End: 2023-06-10

## 2023-06-10 RX ADMIN — SODIUM CHLORIDE: 9 INJECTION, SOLUTION INTRAVENOUS at 06:31

## 2023-06-10 RX ADMIN — ACETAMINOPHEN 650 MG: 325 TABLET ORAL at 11:50

## 2023-06-10 RX ADMIN — OXYCODONE AND ACETAMINOPHEN 1 TABLET: 5; 325 TABLET ORAL at 20:58

## 2023-06-10 RX ADMIN — POTASSIUM CHLORIDE 10 MEQ: 7.46 INJECTION, SOLUTION INTRAVENOUS at 06:28

## 2023-06-10 RX ADMIN — OXYCODONE AND ACETAMINOPHEN 1 TABLET: 5; 325 TABLET ORAL at 10:15

## 2023-06-10 RX ADMIN — PIPERACILLIN AND TAZOBACTAM 3375 MG: 3; .375 INJECTION, POWDER, LYOPHILIZED, FOR SOLUTION INTRAVENOUS at 10:02

## 2023-06-10 RX ADMIN — MAGNESIUM SULFATE HEPTAHYDRATE 2000 MG: 40 INJECTION, SOLUTION INTRAVENOUS at 14:22

## 2023-06-10 RX ADMIN — POTASSIUM CHLORIDE 40 MEQ: 1500 TABLET, EXTENDED RELEASE ORAL at 09:54

## 2023-06-10 RX ADMIN — PIPERACILLIN AND TAZOBACTAM 3375 MG: 3; .375 INJECTION, POWDER, LYOPHILIZED, FOR SOLUTION INTRAVENOUS at 18:53

## 2023-06-10 RX ADMIN — PIPERACILLIN AND TAZOBACTAM 3375 MG: 3; .375 INJECTION, POWDER, LYOPHILIZED, FOR SOLUTION INTRAVENOUS at 02:20

## 2023-06-10 RX ADMIN — POTASSIUM & SODIUM PHOSPHATES POWDER PACK 280-160-250 MG 250 MG: 280-160-250 PACK at 11:45

## 2023-06-10 RX ADMIN — BUDESONIDE AND FORMOTEROL FUMARATE DIHYDRATE 2 PUFF: 160; 4.5 AEROSOL RESPIRATORY (INHALATION) at 08:16

## 2023-06-10 RX ADMIN — MIRTAZAPINE 15 MG: 15 TABLET, ORALLY DISINTEGRATING ORAL at 20:58

## 2023-06-10 RX ADMIN — POTASSIUM PHOSPHATE, MONOBASIC AND POTASSIUM PHOSPHATE, DIBASIC 30 MMOL: 224; 236 INJECTION, SOLUTION, CONCENTRATE INTRAVENOUS at 10:12

## 2023-06-10 RX ADMIN — POTASSIUM & SODIUM PHOSPHATES POWDER PACK 280-160-250 MG 250 MG: 280-160-250 PACK at 09:54

## 2023-06-10 RX ADMIN — ATORVASTATIN CALCIUM 40 MG: 40 TABLET, FILM COATED ORAL at 20:58

## 2023-06-10 RX ADMIN — OXYCODONE AND ACETAMINOPHEN 1 TABLET: 5; 325 TABLET ORAL at 02:18

## 2023-06-10 ASSESSMENT — PAIN DESCRIPTION - LOCATION
LOCATION: ABDOMEN
LOCATION: ABDOMEN
LOCATION: GENERALIZED
LOCATION: CHEST
LOCATION: ABDOMEN

## 2023-06-10 ASSESSMENT — PAIN SCALES - GENERAL
PAINLEVEL_OUTOF10: 4
PAINLEVEL_OUTOF10: 7
PAINLEVEL_OUTOF10: 0
PAINLEVEL_OUTOF10: 0
PAINLEVEL_OUTOF10: 6
PAINLEVEL_OUTOF10: 4
PAINLEVEL_OUTOF10: 6
PAINLEVEL_OUTOF10: 4

## 2023-06-10 ASSESSMENT — ENCOUNTER SYMPTOMS
VOMITING: 0
SORE THROAT: 0
SINUS PAIN: 0
SINUS PRESSURE: 0
COUGH: 1
TROUBLE SWALLOWING: 0
CONSTIPATION: 0
DIARRHEA: 0
VOICE CHANGE: 0
WHEEZING: 0
CHEST TIGHTNESS: 0
NAUSEA: 0
ABDOMINAL PAIN: 0
SHORTNESS OF BREATH: 1
COLOR CHANGE: 0
BACK PAIN: 0

## 2023-06-10 ASSESSMENT — PAIN DESCRIPTION - ORIENTATION
ORIENTATION: RIGHT;ANTERIOR
ORIENTATION: RIGHT
ORIENTATION: RIGHT

## 2023-06-10 ASSESSMENT — PAIN DESCRIPTION - DESCRIPTORS: DESCRIPTORS: ACHING;DISCOMFORT;TENDER

## 2023-06-11 LAB
ANION GAP SERPL CALCULATED.3IONS-SCNC: 8 MMOL/L (ref 4–16)
BASOPHILS ABSOLUTE: 0.1 K/CU MM
BASOPHILS RELATIVE PERCENT: 0.3 % (ref 0–1)
BUN SERPL-MCNC: 15 MG/DL (ref 6–23)
CALCIUM SERPL-MCNC: 10.5 MG/DL (ref 8.3–10.6)
CHLORIDE BLD-SCNC: 106 MMOL/L (ref 99–110)
CO2: 24 MMOL/L (ref 21–32)
CREAT SERPL-MCNC: 0.9 MG/DL (ref 0.9–1.3)
DIFFERENTIAL TYPE: ABNORMAL
EOSINOPHILS ABSOLUTE: 0.3 K/CU MM
EOSINOPHILS RELATIVE PERCENT: 1.1 % (ref 0–3)
GFR SERPL CREATININE-BSD FRML MDRD: >60 ML/MIN/1.73M2
GLUCOSE SERPL-MCNC: 112 MG/DL (ref 70–99)
HCT VFR BLD CALC: 27.8 % (ref 42–52)
HEMOGLOBIN: 8.7 GM/DL (ref 13.5–18)
IMMATURE NEUTROPHIL %: 0.6 % (ref 0–0.43)
KAPPA LC FREE SER-MCNC: 60.85 MG/L (ref 3.3–19.4)
KAPPA LC FREE/LAMBDA FREE SER NEPH: 0.71 {RATIO} (ref 0.26–1.65)
LAMBDA LC FREE SERPL-MCNC: 86.08 MG/L (ref 5.71–26.3)
LYMPHOCYTES ABSOLUTE: 2.3 K/CU MM
LYMPHOCYTES RELATIVE PERCENT: 9.9 % (ref 24–44)
MAGNESIUM: 1.9 MG/DL (ref 1.8–2.4)
MCH RBC QN AUTO: 27.5 PG (ref 27–31)
MCHC RBC AUTO-ENTMCNC: 31.3 % (ref 32–36)
MCV RBC AUTO: 88 FL (ref 78–100)
MONOCYTES ABSOLUTE: 1.8 K/CU MM
MONOCYTES RELATIVE PERCENT: 7.6 % (ref 0–4)
NUCLEATED RBC %: 0 %
PDW BLD-RTO: 13.6 % (ref 11.7–14.9)
PLATELET # BLD: 336 K/CU MM (ref 140–440)
PMV BLD AUTO: 8.7 FL (ref 7.5–11.1)
POTASSIUM SERPL-SCNC: 3.7 MMOL/L (ref 3.5–5.1)
RBC # BLD: 3.16 M/CU MM (ref 4.6–6.2)
SEGMENTED NEUTROPHILS ABSOLUTE COUNT: 18.6 K/CU MM
SEGMENTED NEUTROPHILS RELATIVE PERCENT: 80.5 % (ref 36–66)
SODIUM BLD-SCNC: 138 MMOL/L (ref 135–145)
TOTAL IMMATURE NEUTOROPHIL: 0.13 K/CU MM
TOTAL NUCLEATED RBC: 0 K/CU MM
WBC # BLD: 23.1 K/CU MM (ref 4–10.5)

## 2023-06-11 PROCEDURE — 2580000003 HC RX 258: Performed by: STUDENT IN AN ORGANIZED HEALTH CARE EDUCATION/TRAINING PROGRAM

## 2023-06-11 PROCEDURE — 83735 ASSAY OF MAGNESIUM: CPT

## 2023-06-11 PROCEDURE — 6370000000 HC RX 637 (ALT 250 FOR IP): Performed by: STUDENT IN AN ORGANIZED HEALTH CARE EDUCATION/TRAINING PROGRAM

## 2023-06-11 PROCEDURE — 85025 COMPLETE CBC W/AUTO DIFF WBC: CPT

## 2023-06-11 PROCEDURE — 36415 COLL VENOUS BLD VENIPUNCTURE: CPT

## 2023-06-11 PROCEDURE — 2580000003 HC RX 258: Performed by: INTERNAL MEDICINE

## 2023-06-11 PROCEDURE — 87081 CULTURE SCREEN ONLY: CPT

## 2023-06-11 PROCEDURE — 84156 ASSAY OF PROTEIN URINE: CPT

## 2023-06-11 PROCEDURE — 94761 N-INVAS EAR/PLS OXIMETRY MLT: CPT

## 2023-06-11 PROCEDURE — 80048 BASIC METABOLIC PNL TOTAL CA: CPT

## 2023-06-11 PROCEDURE — 2060000000 HC ICU INTERMEDIATE R&B

## 2023-06-11 PROCEDURE — 6370000000 HC RX 637 (ALT 250 FOR IP): Performed by: INTERNAL MEDICINE

## 2023-06-11 PROCEDURE — 6360000002 HC RX W HCPCS: Performed by: STUDENT IN AN ORGANIZED HEALTH CARE EDUCATION/TRAINING PROGRAM

## 2023-06-11 PROCEDURE — 84166 PROTEIN E-PHORESIS/URINE/CSF: CPT

## 2023-06-11 PROCEDURE — 87641 MR-STAPH DNA AMP PROBE: CPT

## 2023-06-11 PROCEDURE — 94640 AIRWAY INHALATION TREATMENT: CPT

## 2023-06-11 RX ORDER — POTASSIUM CHLORIDE 20 MEQ/1
20 TABLET, EXTENDED RELEASE ORAL ONCE
Status: COMPLETED | OUTPATIENT
Start: 2023-06-11 | End: 2023-06-11

## 2023-06-11 RX ADMIN — SODIUM CHLORIDE: 9 INJECTION, SOLUTION INTRAVENOUS at 20:49

## 2023-06-11 RX ADMIN — OXYCODONE AND ACETAMINOPHEN 1 TABLET: 5; 325 TABLET ORAL at 09:04

## 2023-06-11 RX ADMIN — POTASSIUM CHLORIDE 20 MEQ: 1500 TABLET, EXTENDED RELEASE ORAL at 09:04

## 2023-06-11 RX ADMIN — PIPERACILLIN AND TAZOBACTAM 3375 MG: 3; .375 INJECTION, POWDER, LYOPHILIZED, FOR SOLUTION INTRAVENOUS at 02:00

## 2023-06-11 RX ADMIN — PIPERACILLIN AND TAZOBACTAM 3375 MG: 3; .375 INJECTION, POWDER, LYOPHILIZED, FOR SOLUTION INTRAVENOUS at 09:07

## 2023-06-11 RX ADMIN — ATORVASTATIN CALCIUM 40 MG: 40 TABLET, FILM COATED ORAL at 20:46

## 2023-06-11 RX ADMIN — MIRTAZAPINE 15 MG: 15 TABLET, ORALLY DISINTEGRATING ORAL at 20:46

## 2023-06-11 RX ADMIN — PIPERACILLIN AND TAZOBACTAM 3375 MG: 3; .375 INJECTION, POWDER, LYOPHILIZED, FOR SOLUTION INTRAVENOUS at 16:26

## 2023-06-11 RX ADMIN — BUDESONIDE AND FORMOTEROL FUMARATE DIHYDRATE 2 PUFF: 160; 4.5 AEROSOL RESPIRATORY (INHALATION) at 09:05

## 2023-06-11 RX ADMIN — OXYCODONE AND ACETAMINOPHEN 1 TABLET: 5; 325 TABLET ORAL at 20:46

## 2023-06-11 ASSESSMENT — ENCOUNTER SYMPTOMS
ABDOMINAL PAIN: 0
COUGH: 1
VOICE CHANGE: 0
DIARRHEA: 0
SORE THROAT: 0
SINUS PRESSURE: 0
NAUSEA: 0
SHORTNESS OF BREATH: 1
TROUBLE SWALLOWING: 0
COLOR CHANGE: 0
SINUS PAIN: 0
VOMITING: 0
CONSTIPATION: 0
CHEST TIGHTNESS: 0
WHEEZING: 0
BACK PAIN: 0

## 2023-06-11 ASSESSMENT — PAIN - FUNCTIONAL ASSESSMENT: PAIN_FUNCTIONAL_ASSESSMENT: ACTIVITIES ARE NOT PREVENTED

## 2023-06-11 ASSESSMENT — PAIN DESCRIPTION - ORIENTATION: ORIENTATION: RIGHT

## 2023-06-11 ASSESSMENT — PAIN SCALES - GENERAL
PAINLEVEL_OUTOF10: 6
PAINLEVEL_OUTOF10: 9

## 2023-06-11 ASSESSMENT — PAIN DESCRIPTION - LOCATION: LOCATION: ABDOMEN

## 2023-06-11 ASSESSMENT — PAIN DESCRIPTION - DESCRIPTORS: DESCRIPTORS: DISCOMFORT

## 2023-06-12 ENCOUNTER — APPOINTMENT (OUTPATIENT)
Dept: GENERAL RADIOLOGY | Age: 58
DRG: 720 | End: 2023-06-12
Attending: RADIOLOGY
Payer: COMMERCIAL

## 2023-06-12 ENCOUNTER — HOSPITAL ENCOUNTER (INPATIENT)
Dept: CT IMAGING | Age: 58
Discharge: HOME | DRG: 720 | End: 2023-06-12
Payer: COMMERCIAL

## 2023-06-12 ENCOUNTER — APPOINTMENT (OUTPATIENT)
Dept: CT IMAGING | Age: 58
DRG: 720 | End: 2023-06-12
Payer: COMMERCIAL

## 2023-06-12 DIAGNOSIS — R91.8 MASS OF UPPER LOBE OF LEFT LUNG: ICD-10-CM

## 2023-06-12 LAB
ALBUMIN SERPL ELPH-MCNC: 2.3 GM/DL (ref 3.2–5.6)
ALBUMIN, U: 18 %
ALPHA-1-GLOBULIN, U: 11 %
ALPHA-1-GLOBULIN: 0.4 GM/DL (ref 0.1–0.4)
ALPHA-2-GLOBULIN, U: 14 %
ALPHA-2-GLOBULIN: 1 GM/DL (ref 0.4–1.2)
ANION GAP SERPL CALCULATED.3IONS-SCNC: 9 MMOL/L (ref 4–16)
BASOPHILS ABSOLUTE: 0.1 K/CU MM
BASOPHILS RELATIVE PERCENT: 0.3 % (ref 0–1)
BETA GLOBULIN, U: 22 %
BETA GLOBULIN: 1 GM/DL (ref 0.5–1.3)
BUN SERPL-MCNC: 12 MG/DL (ref 6–23)
CALCIUM SERPL-MCNC: 10.4 MG/DL (ref 8.3–10.6)
CHLORIDE BLD-SCNC: 104 MMOL/L (ref 99–110)
CO2: 23 MMOL/L (ref 21–32)
CREAT SERPL-MCNC: 0.9 MG/DL (ref 0.9–1.3)
CULTURE: NORMAL
CULTURE: NORMAL
DIFFERENTIAL TYPE: ABNORMAL
EOSINOPHILS ABSOLUTE: 0.4 K/CU MM
EOSINOPHILS RELATIVE PERCENT: 1.3 % (ref 0–3)
GAMMA GLOBULIN, U: 35 %
GAMMA GLOBULIN: 1.4 GM/DL (ref 0.5–1.6)
GFR SERPL CREATININE-BSD FRML MDRD: >60 ML/MIN/1.73M2
GLUCOSE SERPL-MCNC: 78 MG/DL (ref 70–99)
HCT VFR BLD CALC: 29.9 % (ref 42–52)
HEMOGLOBIN: 8.8 GM/DL (ref 13.5–18)
IMMATURE NEUTROPHIL %: 0.8 % (ref 0–0.43)
LYMPHOCYTES ABSOLUTE: 2.5 K/CU MM
LYMPHOCYTES RELATIVE PERCENT: 8.7 % (ref 24–44)
Lab: NORMAL
Lab: NORMAL
MAGNESIUM: 1.7 MG/DL (ref 1.8–2.4)
MCH RBC QN AUTO: 27 PG (ref 27–31)
MCHC RBC AUTO-ENTMCNC: 29.4 % (ref 32–36)
MCV RBC AUTO: 91.7 FL (ref 78–100)
MONOCYTES ABSOLUTE: 2.3 K/CU MM
MONOCYTES RELATIVE PERCENT: 7.9 % (ref 0–4)
MRSA, DNA, NASAL: NEGATIVE
NUCLEATED RBC %: 0 %
PDW BLD-RTO: 14.1 % (ref 11.7–14.9)
PLATELET # BLD: 365 K/CU MM (ref 140–440)
PMV BLD AUTO: 8.7 FL (ref 7.5–11.1)
POTASSIUM SERPL-SCNC: 4.5 MMOL/L (ref 3.5–5.1)
RBC # BLD: 3.26 M/CU MM (ref 4.6–6.2)
SEGMENTED NEUTROPHILS ABSOLUTE COUNT: 23.3 K/CU MM
SEGMENTED NEUTROPHILS RELATIVE PERCENT: 81 % (ref 36–66)
SMEAR REVIEW: NORMAL
SODIUM BLD-SCNC: 136 MMOL/L (ref 135–145)
SPECIMEN DESCRIPTION: NORMAL
SPECIMEN: NORMAL
SPECIMEN: NORMAL
SPEP INTERPRETATION: ABNORMAL
SPEP INTERPRETATION: NORMAL
TOTAL IMMATURE NEUTOROPHIL: 0.23 K/CU MM
TOTAL NUCLEATED RBC: 0 K/CU MM
TOTAL PROTEIN: 6.2 GM/DL (ref 6.4–8.2)
URINE TOTAL PROTEIN: 26.4 MG/DL
WBC # BLD: 28.8 K/CU MM (ref 4–10.5)

## 2023-06-12 PROCEDURE — 2720000010 CT NEEDLE BIOPSY LUNG PERCUTANEOUS W IMAGING GUIDANCE

## 2023-06-12 PROCEDURE — 71045 X-RAY EXAM CHEST 1 VIEW: CPT

## 2023-06-12 PROCEDURE — 2060000000 HC ICU INTERMEDIATE R&B

## 2023-06-12 PROCEDURE — 6360000002 HC RX W HCPCS: Performed by: INTERNAL MEDICINE

## 2023-06-12 PROCEDURE — 88334 PATH CONSLTJ SURG CYTO XM EA: CPT | Performed by: PATHOLOGY

## 2023-06-12 PROCEDURE — 6370000000 HC RX 637 (ALT 250 FOR IP): Performed by: INTERNAL MEDICINE

## 2023-06-12 PROCEDURE — 2580000003 HC RX 258: Performed by: STUDENT IN AN ORGANIZED HEALTH CARE EDUCATION/TRAINING PROGRAM

## 2023-06-12 PROCEDURE — 88341 IMHCHEM/IMCYTCHM EA ADD ANTB: CPT | Performed by: PATHOLOGY

## 2023-06-12 PROCEDURE — 88305 TISSUE EXAM BY PATHOLOGIST: CPT | Performed by: PATHOLOGY

## 2023-06-12 PROCEDURE — 94640 AIRWAY INHALATION TREATMENT: CPT

## 2023-06-12 PROCEDURE — 2580000003 HC RX 258: Performed by: INTERNAL MEDICINE

## 2023-06-12 PROCEDURE — APPNB45 APP NON BILLABLE 31-45 MINUTES: Performed by: PHYSICIAN ASSISTANT

## 2023-06-12 PROCEDURE — 85025 COMPLETE CBC W/AUTO DIFF WBC: CPT

## 2023-06-12 PROCEDURE — 94761 N-INVAS EAR/PLS OXIMETRY MLT: CPT

## 2023-06-12 PROCEDURE — 99232 SBSQ HOSP IP/OBS MODERATE 35: CPT | Performed by: INTERNAL MEDICINE

## 2023-06-12 PROCEDURE — 6360000002 HC RX W HCPCS: Performed by: STUDENT IN AN ORGANIZED HEALTH CARE EDUCATION/TRAINING PROGRAM

## 2023-06-12 PROCEDURE — 88342 IMHCHEM/IMCYTCHM 1ST ANTB: CPT | Performed by: PATHOLOGY

## 2023-06-12 PROCEDURE — 83735 ASSAY OF MAGNESIUM: CPT

## 2023-06-12 PROCEDURE — 80048 BASIC METABOLIC PNL TOTAL CA: CPT

## 2023-06-12 PROCEDURE — 97530 THERAPEUTIC ACTIVITIES: CPT

## 2023-06-12 PROCEDURE — 0BBC3ZX EXCISION OF RIGHT UPPER LUNG LOBE, PERCUTANEOUS APPROACH, DIAGNOSTIC: ICD-10-PCS | Performed by: INTERNAL MEDICINE

## 2023-06-12 PROCEDURE — 88360 TUMOR IMMUNOHISTOCHEM/MANUAL: CPT | Performed by: PATHOLOGY

## 2023-06-12 PROCEDURE — 36415 COLL VENOUS BLD VENIPUNCTURE: CPT

## 2023-06-12 PROCEDURE — 6370000000 HC RX 637 (ALT 250 FOR IP): Performed by: STUDENT IN AN ORGANIZED HEALTH CARE EDUCATION/TRAINING PROGRAM

## 2023-06-12 PROCEDURE — 88333 PATH CONSLTJ SURG CYTO XM 1: CPT | Performed by: PATHOLOGY

## 2023-06-12 RX ORDER — MAGNESIUM SULFATE IN WATER 40 MG/ML
2000 INJECTION, SOLUTION INTRAVENOUS ONCE
Status: COMPLETED | OUTPATIENT
Start: 2023-06-12 | End: 2023-06-12

## 2023-06-12 RX ADMIN — ATORVASTATIN CALCIUM 40 MG: 40 TABLET, FILM COATED ORAL at 20:28

## 2023-06-12 RX ADMIN — PIPERACILLIN AND TAZOBACTAM 3375 MG: 3; .375 INJECTION, POWDER, LYOPHILIZED, FOR SOLUTION INTRAVENOUS at 18:22

## 2023-06-12 RX ADMIN — SODIUM CHLORIDE: 9 INJECTION, SOLUTION INTRAVENOUS at 20:28

## 2023-06-12 RX ADMIN — OXYCODONE AND ACETAMINOPHEN 1 TABLET: 5; 325 TABLET ORAL at 04:00

## 2023-06-12 RX ADMIN — PIPERACILLIN AND TAZOBACTAM 3375 MG: 3; .375 INJECTION, POWDER, LYOPHILIZED, FOR SOLUTION INTRAVENOUS at 10:42

## 2023-06-12 RX ADMIN — HYDROMORPHONE HYDROCHLORIDE 0.25 MG: 1 INJECTION, SOLUTION INTRAMUSCULAR; INTRAVENOUS; SUBCUTANEOUS at 20:28

## 2023-06-12 RX ADMIN — MAGNESIUM SULFATE HEPTAHYDRATE 2000 MG: 40 INJECTION, SOLUTION INTRAVENOUS at 08:43

## 2023-06-12 RX ADMIN — OXYCODONE AND ACETAMINOPHEN 1 TABLET: 5; 325 TABLET ORAL at 15:33

## 2023-06-12 RX ADMIN — MIRTAZAPINE 15 MG: 15 TABLET, ORALLY DISINTEGRATING ORAL at 20:28

## 2023-06-12 RX ADMIN — BUDESONIDE AND FORMOTEROL FUMARATE DIHYDRATE 2 PUFF: 160; 4.5 AEROSOL RESPIRATORY (INHALATION) at 20:18

## 2023-06-12 RX ADMIN — Medication 3 MG: at 20:28

## 2023-06-12 RX ADMIN — OXYCODONE AND ACETAMINOPHEN 1 TABLET: 5; 325 TABLET ORAL at 23:47

## 2023-06-12 RX ADMIN — PIPERACILLIN AND TAZOBACTAM 3375 MG: 3; .375 INJECTION, POWDER, LYOPHILIZED, FOR SOLUTION INTRAVENOUS at 02:06

## 2023-06-12 RX ADMIN — BUDESONIDE AND FORMOTEROL FUMARATE DIHYDRATE 2 PUFF: 160; 4.5 AEROSOL RESPIRATORY (INHALATION) at 09:44

## 2023-06-12 RX ADMIN — HYDROMORPHONE HYDROCHLORIDE 0.25 MG: 1 INJECTION, SOLUTION INTRAMUSCULAR; INTRAVENOUS; SUBCUTANEOUS at 09:40

## 2023-06-12 RX ADMIN — SODIUM CHLORIDE, PRESERVATIVE FREE 10 ML: 5 INJECTION INTRAVENOUS at 20:29

## 2023-06-12 ASSESSMENT — PAIN - FUNCTIONAL ASSESSMENT
PAIN_FUNCTIONAL_ASSESSMENT: PREVENTS OR INTERFERES SOME ACTIVE ACTIVITIES AND ADLS
PAIN_FUNCTIONAL_ASSESSMENT: PREVENTS OR INTERFERES SOME ACTIVE ACTIVITIES AND ADLS
PAIN_FUNCTIONAL_ASSESSMENT: ACTIVITIES ARE NOT PREVENTED

## 2023-06-12 ASSESSMENT — PAIN DESCRIPTION - LOCATION
LOCATION: ABDOMEN
LOCATION: ABDOMEN
LOCATION: GENERALIZED
LOCATION: ABDOMEN
LOCATION: CHEST
LOCATION: ABDOMEN

## 2023-06-12 ASSESSMENT — PAIN SCALES - GENERAL
PAINLEVEL_OUTOF10: 7
PAINLEVEL_OUTOF10: 6
PAINLEVEL_OUTOF10: 10
PAINLEVEL_OUTOF10: 0
PAINLEVEL_OUTOF10: 8
PAINLEVEL_OUTOF10: 7
PAINLEVEL_OUTOF10: 10
PAINLEVEL_OUTOF10: 8
PAINLEVEL_OUTOF10: 3

## 2023-06-12 ASSESSMENT — ENCOUNTER SYMPTOMS
BACK PAIN: 0
COLOR CHANGE: 0
VOMITING: 0
WHEEZING: 0
ABDOMINAL PAIN: 0
DIARRHEA: 0
CONSTIPATION: 0
NAUSEA: 0
COUGH: 1
SINUS PAIN: 0
VOICE CHANGE: 0
TROUBLE SWALLOWING: 0
CHEST TIGHTNESS: 0
SINUS PRESSURE: 0
SORE THROAT: 0

## 2023-06-12 ASSESSMENT — PAIN DESCRIPTION - ORIENTATION
ORIENTATION: RIGHT;UPPER
ORIENTATION: RIGHT;UPPER
ORIENTATION: RIGHT
ORIENTATION: RIGHT;UPPER

## 2023-06-12 ASSESSMENT — PAIN DESCRIPTION - DESCRIPTORS
DESCRIPTORS: ACHING;DISCOMFORT
DESCRIPTORS: ACHING;DISCOMFORT;SORE
DESCRIPTORS: ACHING
DESCRIPTORS: ACHING;DISCOMFORT
DESCRIPTORS: ACHING
DESCRIPTORS: DISCOMFORT

## 2023-06-12 ASSESSMENT — PAIN DESCRIPTION - PAIN TYPE
TYPE: CHRONIC PAIN
TYPE: CHRONIC PAIN

## 2023-06-13 ENCOUNTER — APPOINTMENT (OUTPATIENT)
Dept: INTERVENTIONAL RADIOLOGY/VASCULAR | Age: 58
DRG: 720 | End: 2023-06-13
Attending: RADIOLOGY
Payer: COMMERCIAL

## 2023-06-13 LAB
ANION GAP SERPL CALCULATED.3IONS-SCNC: 8 MMOL/L (ref 4–16)
BASOPHILS ABSOLUTE: 0.1 K/CU MM
BASOPHILS RELATIVE PERCENT: 0.3 % (ref 0–1)
BUN SERPL-MCNC: 12 MG/DL (ref 6–23)
CALCIUM SERPL-MCNC: 10.9 MG/DL (ref 8.3–10.6)
CHLORIDE BLD-SCNC: 103 MMOL/L (ref 99–110)
CO2: 26 MMOL/L (ref 21–32)
CREAT SERPL-MCNC: 1 MG/DL (ref 0.9–1.3)
DIFFERENTIAL TYPE: ABNORMAL
EOSINOPHILS ABSOLUTE: 0.3 K/CU MM
EOSINOPHILS RELATIVE PERCENT: 1.2 % (ref 0–3)
GFR SERPL CREATININE-BSD FRML MDRD: >60 ML/MIN/1.73M2
GLUCOSE SERPL-MCNC: 94 MG/DL (ref 70–99)
HCT VFR BLD CALC: 30.4 % (ref 42–52)
HEMOGLOBIN: 9.1 GM/DL (ref 13.5–18)
IMMATURE NEUTROPHIL %: 0.6 % (ref 0–0.43)
LYMPHOCYTES ABSOLUTE: 1.8 K/CU MM
LYMPHOCYTES RELATIVE PERCENT: 6.1 % (ref 24–44)
MAGNESIUM: 2.3 MG/DL (ref 1.8–2.4)
MCH RBC QN AUTO: 27.4 PG (ref 27–31)
MCHC RBC AUTO-ENTMCNC: 29.9 % (ref 32–36)
MCV RBC AUTO: 91.6 FL (ref 78–100)
MONOCYTES ABSOLUTE: 2.9 K/CU MM
MONOCYTES RELATIVE PERCENT: 10 % (ref 0–4)
NUCLEATED RBC %: 0 %
PDW BLD-RTO: 14.5 % (ref 11.7–14.9)
PLATELET # BLD: 398 K/CU MM (ref 140–440)
PMV BLD AUTO: 8.8 FL (ref 7.5–11.1)
POTASSIUM SERPL-SCNC: 4.3 MMOL/L (ref 3.5–5.1)
RBC # BLD: 3.32 M/CU MM (ref 4.6–6.2)
SEGMENTED NEUTROPHILS ABSOLUTE COUNT: 23.3 K/CU MM
SEGMENTED NEUTROPHILS RELATIVE PERCENT: 81.8 % (ref 36–66)
SODIUM BLD-SCNC: 137 MMOL/L (ref 135–145)
TOTAL IMMATURE NEUTOROPHIL: 0.18 K/CU MM
TOTAL NUCLEATED RBC: 0 K/CU MM
WBC # BLD: 28.5 K/CU MM (ref 4–10.5)

## 2023-06-13 PROCEDURE — 2580000003 HC RX 258: Performed by: STUDENT IN AN ORGANIZED HEALTH CARE EDUCATION/TRAINING PROGRAM

## 2023-06-13 PROCEDURE — 94640 AIRWAY INHALATION TREATMENT: CPT

## 2023-06-13 PROCEDURE — 94761 N-INVAS EAR/PLS OXIMETRY MLT: CPT

## 2023-06-13 PROCEDURE — 99232 SBSQ HOSP IP/OBS MODERATE 35: CPT | Performed by: INTERNAL MEDICINE

## 2023-06-13 PROCEDURE — 2060000000 HC ICU INTERMEDIATE R&B

## 2023-06-13 PROCEDURE — 85025 COMPLETE CBC W/AUTO DIFF WBC: CPT

## 2023-06-13 PROCEDURE — 6360000002 HC RX W HCPCS: Performed by: STUDENT IN AN ORGANIZED HEALTH CARE EDUCATION/TRAINING PROGRAM

## 2023-06-13 PROCEDURE — 83735 ASSAY OF MAGNESIUM: CPT

## 2023-06-13 PROCEDURE — 36415 COLL VENOUS BLD VENIPUNCTURE: CPT

## 2023-06-13 PROCEDURE — APPNB30 APP NON BILLABLE TIME 0-30 MINS: Performed by: PHYSICIAN ASSISTANT

## 2023-06-13 PROCEDURE — 6360000002 HC RX W HCPCS: Performed by: INTERNAL MEDICINE

## 2023-06-13 PROCEDURE — 80048 BASIC METABOLIC PNL TOTAL CA: CPT

## 2023-06-13 PROCEDURE — 6370000000 HC RX 637 (ALT 250 FOR IP): Performed by: STUDENT IN AN ORGANIZED HEALTH CARE EDUCATION/TRAINING PROGRAM

## 2023-06-13 PROCEDURE — 6370000000 HC RX 637 (ALT 250 FOR IP): Performed by: INTERNAL MEDICINE

## 2023-06-13 RX ADMIN — PIPERACILLIN AND TAZOBACTAM 3375 MG: 3; .375 INJECTION, POWDER, LYOPHILIZED, FOR SOLUTION INTRAVENOUS at 00:51

## 2023-06-13 RX ADMIN — HYDROMORPHONE HYDROCHLORIDE 0.25 MG: 1 INJECTION, SOLUTION INTRAMUSCULAR; INTRAVENOUS; SUBCUTANEOUS at 04:07

## 2023-06-13 RX ADMIN — ATORVASTATIN CALCIUM 40 MG: 40 TABLET, FILM COATED ORAL at 20:22

## 2023-06-13 RX ADMIN — BUDESONIDE AND FORMOTEROL FUMARATE DIHYDRATE 2 PUFF: 160; 4.5 AEROSOL RESPIRATORY (INHALATION) at 08:36

## 2023-06-13 RX ADMIN — MIRTAZAPINE 15 MG: 15 TABLET, ORALLY DISINTEGRATING ORAL at 20:22

## 2023-06-13 RX ADMIN — Medication 3 MG: at 20:22

## 2023-06-13 RX ADMIN — OXYCODONE AND ACETAMINOPHEN 1 TABLET: 5; 325 TABLET ORAL at 14:09

## 2023-06-13 RX ADMIN — HYDROMORPHONE HYDROCHLORIDE 0.25 MG: 1 INJECTION, SOLUTION INTRAMUSCULAR; INTRAVENOUS; SUBCUTANEOUS at 20:21

## 2023-06-13 RX ADMIN — BUDESONIDE AND FORMOTEROL FUMARATE DIHYDRATE 2 PUFF: 160; 4.5 AEROSOL RESPIRATORY (INHALATION) at 22:09

## 2023-06-13 RX ADMIN — PIPERACILLIN AND TAZOBACTAM 3375 MG: 3; .375 INJECTION, POWDER, LYOPHILIZED, FOR SOLUTION INTRAVENOUS at 08:09

## 2023-06-13 RX ADMIN — PIPERACILLIN AND TAZOBACTAM 3375 MG: 3; .375 INJECTION, POWDER, LYOPHILIZED, FOR SOLUTION INTRAVENOUS at 17:25

## 2023-06-13 ASSESSMENT — PAIN DESCRIPTION - PAIN TYPE: TYPE: CHRONIC PAIN

## 2023-06-13 ASSESSMENT — PAIN - FUNCTIONAL ASSESSMENT
PAIN_FUNCTIONAL_ASSESSMENT: ACTIVITIES ARE NOT PREVENTED
PAIN_FUNCTIONAL_ASSESSMENT: PREVENTS OR INTERFERES SOME ACTIVE ACTIVITIES AND ADLS

## 2023-06-13 ASSESSMENT — ENCOUNTER SYMPTOMS
COLOR CHANGE: 0
COUGH: 0
DIARRHEA: 0
SORE THROAT: 0
VOICE CHANGE: 0
CHEST TIGHTNESS: 0
CONSTIPATION: 0
NAUSEA: 0
ABDOMINAL PAIN: 0
WHEEZING: 0
BACK PAIN: 0
TROUBLE SWALLOWING: 0
SINUS PRESSURE: 0
SINUS PAIN: 0
VOMITING: 0

## 2023-06-13 ASSESSMENT — PAIN SCALES - GENERAL
PAINLEVEL_OUTOF10: 10
PAINLEVEL_OUTOF10: 0
PAINLEVEL_OUTOF10: 2
PAINLEVEL_OUTOF10: 10
PAINLEVEL_OUTOF10: 2
PAINLEVEL_OUTOF10: 8
PAINLEVEL_OUTOF10: 0

## 2023-06-13 ASSESSMENT — PAIN DESCRIPTION - ORIENTATION
ORIENTATION: RIGHT;UPPER
ORIENTATION: RIGHT;UPPER

## 2023-06-13 ASSESSMENT — PAIN DESCRIPTION - DESCRIPTORS
DESCRIPTORS: ACHING;DISCOMFORT
DESCRIPTORS: ACHING;DISCOMFORT

## 2023-06-13 ASSESSMENT — PAIN DESCRIPTION - LOCATION
LOCATION: ABDOMEN
LOCATION: ABDOMEN

## 2023-06-13 NOTE — CARE COORDINATION
Received referral for ARU. Will review patients clinicals and PT/OT notes. Thank you for the referral.      Will need updated OT note. CM placed whiteboard for updated therapy notes. Will continue to follow.

## 2023-06-13 NOTE — CARE COORDINATION
CM spoke with pt regarding ARU. Pt feels that is a good idea. WB placed for OT to see pt. CM called referral to [facility] with ARU, left referral on secure VM.

## 2023-06-14 LAB
ANION GAP SERPL CALCULATED.3IONS-SCNC: 11 MMOL/L (ref 4–16)
BASOPHILS ABSOLUTE: 0.1 K/CU MM
BASOPHILS RELATIVE PERCENT: 0.3 % (ref 0–1)
BUN SERPL-MCNC: 12 MG/DL (ref 6–23)
CALCIUM SERPL-MCNC: 11.5 MG/DL (ref 8.3–10.6)
CHLORIDE BLD-SCNC: 104 MMOL/L (ref 99–110)
CO2: 20 MMOL/L (ref 21–32)
CREAT SERPL-MCNC: 1 MG/DL (ref 0.9–1.3)
DIFFERENTIAL TYPE: ABNORMAL
EOSINOPHILS ABSOLUTE: 0.3 K/CU MM
EOSINOPHILS RELATIVE PERCENT: 1 % (ref 0–3)
GFR SERPL CREATININE-BSD FRML MDRD: >60 ML/MIN/1.73M2
GLUCOSE SERPL-MCNC: 79 MG/DL (ref 70–99)
HCT VFR BLD CALC: 27.5 % (ref 42–52)
HEMOGLOBIN: 8.1 GM/DL (ref 13.5–18)
IMMATURE NEUTROPHIL %: 0.9 % (ref 0–0.43)
LYMPHOCYTES ABSOLUTE: 2.1 K/CU MM
LYMPHOCYTES RELATIVE PERCENT: 7.8 % (ref 24–44)
MAGNESIUM: 2 MG/DL (ref 1.8–2.4)
MCH RBC QN AUTO: 27.1 PG (ref 27–31)
MCHC RBC AUTO-ENTMCNC: 29.5 % (ref 32–36)
MCV RBC AUTO: 92 FL (ref 78–100)
MONOCYTES ABSOLUTE: 2.7 K/CU MM
MONOCYTES RELATIVE PERCENT: 10 % (ref 0–4)
NUCLEATED RBC %: 0 %
PDW BLD-RTO: 14.6 % (ref 11.7–14.9)
PLATELET # BLD: 413 K/CU MM (ref 140–440)
PMV BLD AUTO: 8.9 FL (ref 7.5–11.1)
POTASSIUM SERPL-SCNC: 4.6 MMOL/L (ref 3.5–5.1)
RBC # BLD: 2.99 M/CU MM (ref 4.6–6.2)
SEGMENTED NEUTROPHILS ABSOLUTE COUNT: 21.4 K/CU MM
SEGMENTED NEUTROPHILS RELATIVE PERCENT: 80 % (ref 36–66)
SODIUM BLD-SCNC: 135 MMOL/L (ref 135–145)
TOTAL IMMATURE NEUTOROPHIL: 0.24 K/CU MM
TOTAL NUCLEATED RBC: 0 K/CU MM
WBC # BLD: 26.7 K/CU MM (ref 4–10.5)

## 2023-06-14 PROCEDURE — 97116 GAIT TRAINING THERAPY: CPT

## 2023-06-14 PROCEDURE — 85025 COMPLETE CBC W/AUTO DIFF WBC: CPT

## 2023-06-14 PROCEDURE — 97530 THERAPEUTIC ACTIVITIES: CPT

## 2023-06-14 PROCEDURE — 6370000000 HC RX 637 (ALT 250 FOR IP): Performed by: INTERNAL MEDICINE

## 2023-06-14 PROCEDURE — 36415 COLL VENOUS BLD VENIPUNCTURE: CPT

## 2023-06-14 PROCEDURE — 6360000002 HC RX W HCPCS: Performed by: STUDENT IN AN ORGANIZED HEALTH CARE EDUCATION/TRAINING PROGRAM

## 2023-06-14 PROCEDURE — 1200000000 HC SEMI PRIVATE

## 2023-06-14 PROCEDURE — 2580000003 HC RX 258: Performed by: STUDENT IN AN ORGANIZED HEALTH CARE EDUCATION/TRAINING PROGRAM

## 2023-06-14 PROCEDURE — 94640 AIRWAY INHALATION TREATMENT: CPT

## 2023-06-14 PROCEDURE — 99232 SBSQ HOSP IP/OBS MODERATE 35: CPT | Performed by: INTERNAL MEDICINE

## 2023-06-14 PROCEDURE — 83735 ASSAY OF MAGNESIUM: CPT

## 2023-06-14 PROCEDURE — 2580000003 HC RX 258: Performed by: INTERNAL MEDICINE

## 2023-06-14 PROCEDURE — 80048 BASIC METABOLIC PNL TOTAL CA: CPT

## 2023-06-14 PROCEDURE — APPNB45 APP NON BILLABLE 31-45 MINUTES: Performed by: PHYSICIAN ASSISTANT

## 2023-06-14 PROCEDURE — 6370000000 HC RX 637 (ALT 250 FOR IP): Performed by: STUDENT IN AN ORGANIZED HEALTH CARE EDUCATION/TRAINING PROGRAM

## 2023-06-14 PROCEDURE — 94761 N-INVAS EAR/PLS OXIMETRY MLT: CPT

## 2023-06-14 RX ORDER — SODIUM CHLORIDE 9 MG/ML
INJECTION, SOLUTION INTRAVENOUS CONTINUOUS
Status: DISCONTINUED | OUTPATIENT
Start: 2023-06-14 | End: 2023-06-16 | Stop reason: HOSPADM

## 2023-06-14 RX ADMIN — PIPERACILLIN AND TAZOBACTAM 3375 MG: 3; .375 INJECTION, POWDER, LYOPHILIZED, FOR SOLUTION INTRAVENOUS at 01:24

## 2023-06-14 RX ADMIN — BUDESONIDE AND FORMOTEROL FUMARATE DIHYDRATE 2 PUFF: 160; 4.5 AEROSOL RESPIRATORY (INHALATION) at 08:10

## 2023-06-14 RX ADMIN — SODIUM CHLORIDE: 9 INJECTION, SOLUTION INTRAVENOUS at 08:59

## 2023-06-14 RX ADMIN — OXYCODONE AND ACETAMINOPHEN 1 TABLET: 5; 325 TABLET ORAL at 08:51

## 2023-06-14 RX ADMIN — SODIUM CHLORIDE: 9 INJECTION, SOLUTION INTRAVENOUS at 16:44

## 2023-06-14 RX ADMIN — OXYCODONE AND ACETAMINOPHEN 1 TABLET: 5; 325 TABLET ORAL at 20:04

## 2023-06-14 RX ADMIN — BUDESONIDE AND FORMOTEROL FUMARATE DIHYDRATE 2 PUFF: 160; 4.5 AEROSOL RESPIRATORY (INHALATION) at 19:59

## 2023-06-14 RX ADMIN — MIRTAZAPINE 15 MG: 15 TABLET, ORALLY DISINTEGRATING ORAL at 20:04

## 2023-06-14 RX ADMIN — PIPERACILLIN AND TAZOBACTAM 3375 MG: 3; .375 INJECTION, POWDER, LYOPHILIZED, FOR SOLUTION INTRAVENOUS at 09:00

## 2023-06-14 RX ADMIN — PIPERACILLIN AND TAZOBACTAM 3375 MG: 3; .375 INJECTION, POWDER, LYOPHILIZED, FOR SOLUTION INTRAVENOUS at 17:18

## 2023-06-14 RX ADMIN — Medication 3 MG: at 20:04

## 2023-06-14 RX ADMIN — CLOPIDOGREL BISULFATE 75 MG: 75 TABLET ORAL at 08:51

## 2023-06-14 RX ADMIN — OXYCODONE AND ACETAMINOPHEN 1 TABLET: 5; 325 TABLET ORAL at 01:26

## 2023-06-14 RX ADMIN — ATORVASTATIN CALCIUM 40 MG: 40 TABLET, FILM COATED ORAL at 20:04

## 2023-06-14 ASSESSMENT — PAIN DESCRIPTION - DESCRIPTORS
DESCRIPTORS: DISCOMFORT
DESCRIPTORS: ACHING;DISCOMFORT
DESCRIPTORS: ACHING;DISCOMFORT

## 2023-06-14 ASSESSMENT — ENCOUNTER SYMPTOMS
VOICE CHANGE: 0
WHEEZING: 0
COLOR CHANGE: 0
CHEST TIGHTNESS: 0
NAUSEA: 0
SINUS PAIN: 0
SORE THROAT: 0
TROUBLE SWALLOWING: 0
DIARRHEA: 0
SINUS PRESSURE: 0
BACK PAIN: 0
CONSTIPATION: 0
VOMITING: 0
ABDOMINAL PAIN: 0
COUGH: 0

## 2023-06-14 ASSESSMENT — PAIN SCALES - GENERAL
PAINLEVEL_OUTOF10: 7
PAINLEVEL_OUTOF10: 3
PAINLEVEL_OUTOF10: 2
PAINLEVEL_OUTOF10: 8
PAINLEVEL_OUTOF10: 10

## 2023-06-14 ASSESSMENT — PAIN - FUNCTIONAL ASSESSMENT
PAIN_FUNCTIONAL_ASSESSMENT: ACTIVITIES ARE NOT PREVENTED

## 2023-06-14 ASSESSMENT — PAIN DESCRIPTION - ORIENTATION
ORIENTATION: RIGHT;UPPER
ORIENTATION: RIGHT;UPPER

## 2023-06-14 ASSESSMENT — PAIN DESCRIPTION - LOCATION
LOCATION: GENERALIZED;BACK
LOCATION: ABDOMEN;GENERALIZED
LOCATION: ABDOMEN

## 2023-06-14 ASSESSMENT — PAIN DESCRIPTION - PAIN TYPE: TYPE: CHRONIC PAIN

## 2023-06-14 NOTE — CARE COORDINATION
ARU following for updated OT note. Will need OT note prior to initiating ARU pre-cert with Noreen. 1230:  Reviewed OT eval.  ARU pre-cert initiated and pending with Noreen at this time. Will follow for determination.

## 2023-06-15 LAB
ANION GAP SERPL CALCULATED.3IONS-SCNC: 10 MMOL/L (ref 4–16)
BASOPHILS ABSOLUTE: 0.1 K/CU MM
BASOPHILS RELATIVE PERCENT: 0.4 % (ref 0–1)
BUN SERPL-MCNC: 14 MG/DL (ref 6–23)
CALCIUM SERPL-MCNC: 13.2 MG/DL (ref 8.3–10.6)
CHLORIDE BLD-SCNC: 99 MMOL/L (ref 99–110)
CO2: 23 MMOL/L (ref 21–32)
CREAT SERPL-MCNC: 1 MG/DL (ref 0.9–1.3)
DIFFERENTIAL TYPE: ABNORMAL
EOSINOPHILS ABSOLUTE: 0.2 K/CU MM
EOSINOPHILS RELATIVE PERCENT: 0.9 % (ref 0–3)
GFR SERPL CREATININE-BSD FRML MDRD: >60 ML/MIN/1.73M2
GLUCOSE SERPL-MCNC: 95 MG/DL (ref 70–99)
HCT VFR BLD CALC: 32.7 % (ref 42–52)
HEMOGLOBIN: 9.6 GM/DL (ref 13.5–18)
IMMATURE NEUTROPHIL %: 0.7 % (ref 0–0.43)
LYMPHOCYTES ABSOLUTE: 1.6 K/CU MM
LYMPHOCYTES RELATIVE PERCENT: 6.7 % (ref 24–44)
MCH RBC QN AUTO: 27.4 PG (ref 27–31)
MCHC RBC AUTO-ENTMCNC: 29.4 % (ref 32–36)
MCV RBC AUTO: 93.4 FL (ref 78–100)
MONOCYTES ABSOLUTE: 2.9 K/CU MM
MONOCYTES RELATIVE PERCENT: 12 % (ref 0–4)
NUCLEATED RBC %: 0 %
PDW BLD-RTO: 14.6 % (ref 11.7–14.9)
PLATELET # BLD: 425 K/CU MM (ref 140–440)
PMV BLD AUTO: 8.8 FL (ref 7.5–11.1)
POTASSIUM SERPL-SCNC: 5.2 MMOL/L (ref 3.5–5.1)
RBC # BLD: 3.5 M/CU MM (ref 4.6–6.2)
SEGMENTED NEUTROPHILS ABSOLUTE COUNT: 19.1 K/CU MM
SEGMENTED NEUTROPHILS RELATIVE PERCENT: 79.3 % (ref 36–66)
SODIUM BLD-SCNC: 132 MMOL/L (ref 135–145)
TOTAL IMMATURE NEUTOROPHIL: 0.17 K/CU MM
TOTAL NUCLEATED RBC: 0 K/CU MM
WBC # BLD: 24 K/CU MM (ref 4–10.5)

## 2023-06-15 PROCEDURE — 6370000000 HC RX 637 (ALT 250 FOR IP): Performed by: STUDENT IN AN ORGANIZED HEALTH CARE EDUCATION/TRAINING PROGRAM

## 2023-06-15 PROCEDURE — 97116 GAIT TRAINING THERAPY: CPT

## 2023-06-15 PROCEDURE — 85025 COMPLETE CBC W/AUTO DIFF WBC: CPT

## 2023-06-15 PROCEDURE — APPNB45 APP NON BILLABLE 31-45 MINUTES: Performed by: PHYSICIAN ASSISTANT

## 2023-06-15 PROCEDURE — 36415 COLL VENOUS BLD VENIPUNCTURE: CPT

## 2023-06-15 PROCEDURE — 6370000000 HC RX 637 (ALT 250 FOR IP): Performed by: INTERNAL MEDICINE

## 2023-06-15 PROCEDURE — 6360000002 HC RX W HCPCS: Performed by: INTERNAL MEDICINE

## 2023-06-15 PROCEDURE — 2580000003 HC RX 258: Performed by: INTERNAL MEDICINE

## 2023-06-15 PROCEDURE — 80048 BASIC METABOLIC PNL TOTAL CA: CPT

## 2023-06-15 PROCEDURE — 94761 N-INVAS EAR/PLS OXIMETRY MLT: CPT

## 2023-06-15 PROCEDURE — 97530 THERAPEUTIC ACTIVITIES: CPT

## 2023-06-15 PROCEDURE — 94640 AIRWAY INHALATION TREATMENT: CPT

## 2023-06-15 PROCEDURE — 1200000000 HC SEMI PRIVATE

## 2023-06-15 PROCEDURE — 99232 SBSQ HOSP IP/OBS MODERATE 35: CPT | Performed by: INTERNAL MEDICINE

## 2023-06-15 PROCEDURE — 2580000003 HC RX 258: Performed by: STUDENT IN AN ORGANIZED HEALTH CARE EDUCATION/TRAINING PROGRAM

## 2023-06-15 RX ORDER — OXYCODONE HYDROCHLORIDE AND ACETAMINOPHEN 5; 325 MG/1; MG/1
1 TABLET ORAL EVERY 4 HOURS PRN
Status: DISCONTINUED | OUTPATIENT
Start: 2023-06-15 | End: 2023-06-16 | Stop reason: HOSPADM

## 2023-06-15 RX ORDER — CALCITONIN SALMON 200 [USP'U]/ML
100 INJECTION, SOLUTION INTRAMUSCULAR; SUBCUTANEOUS ONCE
Status: COMPLETED | OUTPATIENT
Start: 2023-06-15 | End: 2023-06-15

## 2023-06-15 RX ADMIN — SODIUM CHLORIDE, PRESERVATIVE FREE 10 ML: 5 INJECTION INTRAVENOUS at 08:11

## 2023-06-15 RX ADMIN — SODIUM CHLORIDE, PRESERVATIVE FREE 10 ML: 5 INJECTION INTRAVENOUS at 20:16

## 2023-06-15 RX ADMIN — BUDESONIDE AND FORMOTEROL FUMARATE DIHYDRATE 2 PUFF: 160; 4.5 AEROSOL RESPIRATORY (INHALATION) at 07:54

## 2023-06-15 RX ADMIN — ATORVASTATIN CALCIUM 40 MG: 40 TABLET, FILM COATED ORAL at 20:14

## 2023-06-15 RX ADMIN — CALCITONIN SALMON 100 UNITS: 200 INJECTION, SOLUTION INTRAMUSCULAR; SUBCUTANEOUS at 15:33

## 2023-06-15 RX ADMIN — MIRTAZAPINE 15 MG: 15 TABLET, ORALLY DISINTEGRATING ORAL at 20:14

## 2023-06-15 RX ADMIN — SODIUM CHLORIDE: 9 INJECTION, SOLUTION INTRAVENOUS at 01:39

## 2023-06-15 RX ADMIN — BUDESONIDE AND FORMOTEROL FUMARATE DIHYDRATE 2 PUFF: 160; 4.5 AEROSOL RESPIRATORY (INHALATION) at 19:55

## 2023-06-15 RX ADMIN — OXYCODONE AND ACETAMINOPHEN 1 TABLET: 5; 325 TABLET ORAL at 04:26

## 2023-06-15 RX ADMIN — SODIUM CHLORIDE: 9 INJECTION, SOLUTION INTRAVENOUS at 12:35

## 2023-06-15 RX ADMIN — OXYCODONE AND ACETAMINOPHEN 1 TABLET: 5; 325 TABLET ORAL at 17:49

## 2023-06-15 RX ADMIN — OXYCODONE AND ACETAMINOPHEN 1 TABLET: 5; 325 TABLET ORAL at 12:34

## 2023-06-15 RX ADMIN — CLOPIDOGREL BISULFATE 75 MG: 75 TABLET ORAL at 08:11

## 2023-06-15 RX ADMIN — SODIUM CHLORIDE: 9 INJECTION, SOLUTION INTRAVENOUS at 21:55

## 2023-06-15 ASSESSMENT — PAIN DESCRIPTION - LOCATION
LOCATION: ABDOMEN;CHEST
LOCATION: OTHER (COMMENT)
LOCATION: ABDOMEN

## 2023-06-15 ASSESSMENT — PAIN SCALES - GENERAL
PAINLEVEL_OUTOF10: 7
PAINLEVEL_OUTOF10: 10
PAINLEVEL_OUTOF10: 0
PAINLEVEL_OUTOF10: 10

## 2023-06-15 ASSESSMENT — ENCOUNTER SYMPTOMS
ABDOMINAL PAIN: 0
NAUSEA: 0
SINUS PAIN: 0
SORE THROAT: 0
CONSTIPATION: 0
CHEST TIGHTNESS: 0
BACK PAIN: 0
COLOR CHANGE: 0
TROUBLE SWALLOWING: 0
DIARRHEA: 0
WHEEZING: 0
SINUS PRESSURE: 0
VOICE CHANGE: 0
COUGH: 0
VOMITING: 0

## 2023-06-15 ASSESSMENT — PAIN - FUNCTIONAL ASSESSMENT
PAIN_FUNCTIONAL_ASSESSMENT: ACTIVITIES ARE NOT PREVENTED

## 2023-06-15 ASSESSMENT — PAIN DESCRIPTION - ORIENTATION
ORIENTATION: RIGHT

## 2023-06-15 ASSESSMENT — PAIN DESCRIPTION - DESCRIPTORS
DESCRIPTORS: ACHING
DESCRIPTORS: ACHING
DESCRIPTORS: ACHING;DISCOMFORT

## 2023-06-15 NOTE — CARE COORDINATION
ARU pre-cert still pending at this time. Will follow for determination. 0935: Met with patient regarding pre-cert process and discussed ARU. Explained to patient the required 3 hours of therapy a day. Also explained the average length of stay is 11 days, could be longer or shorter depending on recommendations of therapy and Dr. Juan Kebede. Patient expresses his understanding and states he's agreeable to admit to ARU. Per patient his goal is to return home alone at discharge from ARU. Explained to patient that pre-cert is still pending at this time. Patient expresses his understanding.

## 2023-06-15 NOTE — PLAN OF CARE
Problem: Discharge Planning  Goal: Discharge to home or other facility with appropriate resources  Outcome: Progressing  Flowsheets (Taken 6/14/2023 1958 by Kaelyn Mg RN)  Discharge to home or other facility with appropriate resources:   Identify barriers to discharge with patient and caregiver   Arrange for needed discharge resources and transportation as appropriate   Identify discharge learning needs (meds, wound care, etc)     Problem: Pain  Goal: Verbalizes/displays adequate comfort level or baseline comfort level  Outcome: Progressing     Problem: Nutrition Deficit:  Goal: Optimize nutritional status  Outcome: Progressing     Problem: Chronic Conditions and Co-morbidities  Goal: Patient's chronic conditions and co-morbidity symptoms are monitored and maintained or improved  Outcome: Progressing     Problem: Safety - Adult  Goal: Free from fall injury  Outcome: Progressing     Problem: Skin/Tissue Integrity - Adult  Goal: Skin integrity remains intact  Outcome: Progressing  Goal: Oral mucous membranes remain intact  Outcome: Progressing     Problem: Musculoskeletal - Adult  Goal: Return mobility to safest level of function  Outcome: Progressing  Goal: Maintain proper alignment of affected body part  Outcome: Progressing  Goal: Return ADL status to a safe level of function  Outcome: Progressing     Problem: Skin/Tissue Integrity  Goal: Absence of new skin breakdown  Description: 1. Monitor for areas of redness and/or skin breakdown  2. Assess vascular access sites hourly  3. Every 4-6 hours minimum:  Change oxygen saturation probe site  4. Every 4-6 hours:  If on nasal continuous positive airway pressure, respiratory therapy assess nares and determine need for appliance change or resting period.   Outcome: Progressing     Problem: Respiratory - Adult  Goal: Achieves optimal ventilation and oxygenation  Outcome: Progressing

## 2023-06-16 ENCOUNTER — HOSPITAL ENCOUNTER (INPATIENT)
Age: 58
LOS: 7 days | Discharge: HOME | DRG: 720 | End: 2023-06-23
Attending: PHYSICAL MEDICINE & REHABILITATION | Admitting: PHYSICAL MEDICINE & REHABILITATION
Payer: COMMERCIAL

## 2023-06-16 VITALS
DIASTOLIC BLOOD PRESSURE: 89 MMHG | TEMPERATURE: 98.2 F | OXYGEN SATURATION: 96 % | HEART RATE: 96 BPM | WEIGHT: 118 LBS | SYSTOLIC BLOOD PRESSURE: 130 MMHG | RESPIRATION RATE: 16 BRPM | BODY MASS INDEX: 17.48 KG/M2 | HEIGHT: 69 IN

## 2023-06-16 DIAGNOSIS — C34.90 SMALL CELL CARCINOMA OF LUNG (HCC): Primary | ICD-10-CM

## 2023-06-16 PROBLEM — G93.41 METABOLIC ENCEPHALOPATHY: Status: ACTIVE | Noted: 2023-01-01

## 2023-06-16 LAB
ANION GAP SERPL CALCULATED.3IONS-SCNC: 11 MMOL/L (ref 4–16)
BASOPHILS ABSOLUTE: 0.1 K/CU MM
BASOPHILS RELATIVE PERCENT: 0.2 % (ref 0–1)
BUN SERPL-MCNC: 12 MG/DL (ref 6–23)
CALCIUM SERPL-MCNC: 11.9 MG/DL (ref 8.3–10.6)
CHLORIDE BLD-SCNC: 103 MMOL/L (ref 99–110)
CO2: 22 MMOL/L (ref 21–32)
CREAT SERPL-MCNC: 1.1 MG/DL (ref 0.9–1.3)
DIFFERENTIAL TYPE: ABNORMAL
EOSINOPHILS ABSOLUTE: 0.1 K/CU MM
EOSINOPHILS RELATIVE PERCENT: 0.5 % (ref 0–3)
GFR SERPL CREATININE-BSD FRML MDRD: >60 ML/MIN/1.73M2
GLUCOSE SERPL-MCNC: 86 MG/DL (ref 70–99)
HCT VFR BLD CALC: 31.8 % (ref 42–52)
HEMOGLOBIN: 9.4 GM/DL (ref 13.5–18)
IMMATURE NEUTROPHIL %: 0.7 % (ref 0–0.43)
LYMPHOCYTES ABSOLUTE: 1.9 K/CU MM
LYMPHOCYTES RELATIVE PERCENT: 7.7 % (ref 24–44)
MCH RBC QN AUTO: 26.9 PG (ref 27–31)
MCHC RBC AUTO-ENTMCNC: 29.6 % (ref 32–36)
MCV RBC AUTO: 90.9 FL (ref 78–100)
MONOCYTES ABSOLUTE: 2.6 K/CU MM
MONOCYTES RELATIVE PERCENT: 10.7 % (ref 0–4)
NUCLEATED RBC %: 0 %
PDW BLD-RTO: 14.4 % (ref 11.7–14.9)
PLATELET # BLD: 471 K/CU MM (ref 140–440)
PMV BLD AUTO: 8.8 FL (ref 7.5–11.1)
POTASSIUM SERPL-SCNC: 4.7 MMOL/L (ref 3.5–5.1)
RBC # BLD: 3.5 M/CU MM (ref 4.6–6.2)
SEGMENTED NEUTROPHILS ABSOLUTE COUNT: 19.7 K/CU MM
SEGMENTED NEUTROPHILS RELATIVE PERCENT: 80.2 % (ref 36–66)
SODIUM BLD-SCNC: 136 MMOL/L (ref 135–145)
TOTAL IMMATURE NEUTOROPHIL: 0.16 K/CU MM
TOTAL NUCLEATED RBC: 0 K/CU MM
WBC # BLD: 24.6 K/CU MM (ref 4–10.5)

## 2023-06-16 PROCEDURE — 2580000003 HC RX 258: Performed by: INTERNAL MEDICINE

## 2023-06-16 PROCEDURE — 85025 COMPLETE CBC W/AUTO DIFF WBC: CPT

## 2023-06-16 PROCEDURE — 99232 SBSQ HOSP IP/OBS MODERATE 35: CPT | Performed by: INTERNAL MEDICINE

## 2023-06-16 PROCEDURE — 6370000000 HC RX 637 (ALT 250 FOR IP): Performed by: INTERNAL MEDICINE

## 2023-06-16 PROCEDURE — 99223 1ST HOSP IP/OBS HIGH 75: CPT | Performed by: PHYSICAL MEDICINE & REHABILITATION

## 2023-06-16 PROCEDURE — 2580000003 HC RX 258: Performed by: STUDENT IN AN ORGANIZED HEALTH CARE EDUCATION/TRAINING PROGRAM

## 2023-06-16 PROCEDURE — 6370000000 HC RX 637 (ALT 250 FOR IP): Performed by: STUDENT IN AN ORGANIZED HEALTH CARE EDUCATION/TRAINING PROGRAM

## 2023-06-16 PROCEDURE — 6360000002 HC RX W HCPCS: Performed by: STUDENT IN AN ORGANIZED HEALTH CARE EDUCATION/TRAINING PROGRAM

## 2023-06-16 PROCEDURE — 6360000002 HC RX W HCPCS: Performed by: INTERNAL MEDICINE

## 2023-06-16 PROCEDURE — 36415 COLL VENOUS BLD VENIPUNCTURE: CPT

## 2023-06-16 PROCEDURE — 80048 BASIC METABOLIC PNL TOTAL CA: CPT

## 2023-06-16 PROCEDURE — 94761 N-INVAS EAR/PLS OXIMETRY MLT: CPT

## 2023-06-16 PROCEDURE — 1280000000 HC REHAB R&B

## 2023-06-16 RX ORDER — BISACODYL 10 MG
10 SUPPOSITORY, RECTAL RECTAL DAILY PRN
Status: DISCONTINUED | OUTPATIENT
Start: 2023-06-16 | End: 2023-06-23 | Stop reason: HOSPADM

## 2023-06-16 RX ORDER — SODIUM CHLORIDE 9 MG/ML
INJECTION, SOLUTION INTRAVENOUS CONTINUOUS
Status: CANCELLED | OUTPATIENT
Start: 2023-06-16

## 2023-06-16 RX ORDER — CLOPIDOGREL BISULFATE 75 MG/1
75 TABLET ORAL DAILY
Status: CANCELLED | OUTPATIENT
Start: 2023-06-17

## 2023-06-16 RX ORDER — SODIUM CHLORIDE 9 MG/ML
INJECTION, SOLUTION INTRAVENOUS PRN
Status: CANCELLED | OUTPATIENT
Start: 2023-06-16

## 2023-06-16 RX ORDER — ENOXAPARIN SODIUM 100 MG/ML
30 INJECTION SUBCUTANEOUS DAILY
Status: DISCONTINUED | OUTPATIENT
Start: 2023-06-17 | End: 2023-06-22

## 2023-06-16 RX ORDER — ATORVASTATIN CALCIUM 40 MG/1
40 TABLET, FILM COATED ORAL EVERY EVENING
Status: CANCELLED | OUTPATIENT
Start: 2023-06-16

## 2023-06-16 RX ORDER — SODIUM CHLORIDE 9 MG/ML
INJECTION, SOLUTION INTRAVENOUS CONTINUOUS
Status: DISCONTINUED | OUTPATIENT
Start: 2023-06-16 | End: 2023-06-16

## 2023-06-16 RX ORDER — MIRTAZAPINE 15 MG/1
15 TABLET, ORALLY DISINTEGRATING ORAL NIGHTLY
Status: DISCONTINUED | OUTPATIENT
Start: 2023-06-16 | End: 2023-06-23 | Stop reason: HOSPADM

## 2023-06-16 RX ORDER — SODIUM CHLORIDE 0.9 % (FLUSH) 0.9 %
10 SYRINGE (ML) INJECTION PRN
Status: DISCONTINUED | OUTPATIENT
Start: 2023-06-16 | End: 2023-06-22

## 2023-06-16 RX ORDER — CLOPIDOGREL BISULFATE 75 MG/1
75 TABLET ORAL DAILY
Status: DISCONTINUED | OUTPATIENT
Start: 2023-06-17 | End: 2023-06-23 | Stop reason: HOSPADM

## 2023-06-16 RX ORDER — SODIUM CHLORIDE 0.9 % (FLUSH) 0.9 %
5-40 SYRINGE (ML) INJECTION PRN
Status: CANCELLED | OUTPATIENT
Start: 2023-06-16

## 2023-06-16 RX ORDER — ALBUTEROL SULFATE 90 UG/1
2 AEROSOL, METERED RESPIRATORY (INHALATION) EVERY 6 HOURS PRN
Status: DISCONTINUED | OUTPATIENT
Start: 2023-06-16 | End: 2023-06-23 | Stop reason: HOSPADM

## 2023-06-16 RX ORDER — LANOLIN ALCOHOL/MO/W.PET/CERES
3 CREAM (GRAM) TOPICAL NIGHTLY PRN
Status: DISCONTINUED | OUTPATIENT
Start: 2023-06-16 | End: 2023-06-23 | Stop reason: HOSPADM

## 2023-06-16 RX ORDER — ACETAMINOPHEN 325 MG/1
650 TABLET ORAL EVERY 6 HOURS PRN
Status: DISCONTINUED | OUTPATIENT
Start: 2023-06-16 | End: 2023-06-16

## 2023-06-16 RX ORDER — SODIUM CHLORIDE 0.9 % (FLUSH) 0.9 %
5-40 SYRINGE (ML) INJECTION PRN
Status: DISCONTINUED | OUTPATIENT
Start: 2023-06-16 | End: 2023-06-22

## 2023-06-16 RX ORDER — MIRTAZAPINE 15 MG/1
15 TABLET, ORALLY DISINTEGRATING ORAL NIGHTLY
Status: CANCELLED | OUTPATIENT
Start: 2023-06-16

## 2023-06-16 RX ORDER — ENOXAPARIN SODIUM 100 MG/ML
30 INJECTION SUBCUTANEOUS DAILY
Status: CANCELLED | OUTPATIENT
Start: 2023-06-17

## 2023-06-16 RX ORDER — LANOLIN ALCOHOL/MO/W.PET/CERES
3 CREAM (GRAM) TOPICAL NIGHTLY PRN
Status: CANCELLED | OUTPATIENT
Start: 2023-06-16

## 2023-06-16 RX ORDER — SODIUM CHLORIDE 9 MG/ML
INJECTION, SOLUTION INTRAVENOUS PRN
Status: DISCONTINUED | OUTPATIENT
Start: 2023-06-16 | End: 2023-06-22

## 2023-06-16 RX ORDER — SODIUM CHLORIDE 0.9 % (FLUSH) 0.9 %
5-40 SYRINGE (ML) INJECTION 2 TIMES DAILY
Status: CANCELLED | OUTPATIENT
Start: 2023-06-16

## 2023-06-16 RX ORDER — OXYCODONE HYDROCHLORIDE AND ACETAMINOPHEN 5; 325 MG/1; MG/1
1 TABLET ORAL EVERY 4 HOURS PRN
Status: CANCELLED | OUTPATIENT
Start: 2023-06-16

## 2023-06-16 RX ORDER — ACETAMINOPHEN 325 MG/1
650 TABLET ORAL EVERY 6 HOURS PRN
Status: CANCELLED | OUTPATIENT
Start: 2023-06-16

## 2023-06-16 RX ORDER — OXYCODONE HYDROCHLORIDE AND ACETAMINOPHEN 5; 325 MG/1; MG/1
1 TABLET ORAL EVERY 4 HOURS PRN
Status: DISCONTINUED | OUTPATIENT
Start: 2023-06-16 | End: 2023-06-22

## 2023-06-16 RX ORDER — ALBUTEROL SULFATE 90 UG/1
2 AEROSOL, METERED RESPIRATORY (INHALATION) EVERY 6 HOURS PRN
Status: CANCELLED | OUTPATIENT
Start: 2023-06-16

## 2023-06-16 RX ORDER — ACETAMINOPHEN 650 MG/1
650 SUPPOSITORY RECTAL EVERY 6 HOURS PRN
Status: CANCELLED | OUTPATIENT
Start: 2023-06-16

## 2023-06-16 RX ORDER — BISACODYL 10 MG
10 SUPPOSITORY, RECTAL RECTAL DAILY PRN
Status: CANCELLED | OUTPATIENT
Start: 2023-06-16

## 2023-06-16 RX ORDER — ACETAMINOPHEN 325 MG/1
650 TABLET ORAL EVERY 4 HOURS PRN
Status: DISCONTINUED | OUTPATIENT
Start: 2023-06-16 | End: 2023-06-23 | Stop reason: HOSPADM

## 2023-06-16 RX ORDER — ACETAMINOPHEN 325 MG/1
650 TABLET ORAL EVERY 4 HOURS PRN
Status: CANCELLED | OUTPATIENT
Start: 2023-06-16

## 2023-06-16 RX ORDER — ATORVASTATIN CALCIUM 40 MG/1
40 TABLET, FILM COATED ORAL EVERY EVENING
Status: DISCONTINUED | OUTPATIENT
Start: 2023-06-16 | End: 2023-06-23 | Stop reason: HOSPADM

## 2023-06-16 RX ORDER — BUDESONIDE AND FORMOTEROL FUMARATE DIHYDRATE 160; 4.5 UG/1; UG/1
2 AEROSOL RESPIRATORY (INHALATION) 2 TIMES DAILY
Status: DISCONTINUED | OUTPATIENT
Start: 2023-06-16 | End: 2023-06-23 | Stop reason: HOSPADM

## 2023-06-16 RX ORDER — SODIUM CHLORIDE 0.9 % (FLUSH) 0.9 %
5-40 SYRINGE (ML) INJECTION 2 TIMES DAILY
Status: DISCONTINUED | OUTPATIENT
Start: 2023-06-16 | End: 2023-06-22

## 2023-06-16 RX ORDER — ACETAMINOPHEN 650 MG/1
650 SUPPOSITORY RECTAL EVERY 6 HOURS PRN
Status: DISCONTINUED | OUTPATIENT
Start: 2023-06-16 | End: 2023-06-16

## 2023-06-16 RX ORDER — BUDESONIDE AND FORMOTEROL FUMARATE DIHYDRATE 160; 4.5 UG/1; UG/1
2 AEROSOL RESPIRATORY (INHALATION) 2 TIMES DAILY
Status: CANCELLED | OUTPATIENT
Start: 2023-06-16

## 2023-06-16 RX ADMIN — ZOLEDRONIC ACID 3 MG: 4 INJECTION, SOLUTION, CONCENTRATE INTRAVENOUS at 11:22

## 2023-06-16 RX ADMIN — ATORVASTATIN CALCIUM 40 MG: 40 TABLET, FILM COATED ORAL at 17:32

## 2023-06-16 RX ADMIN — OXYCODONE AND ACETAMINOPHEN 1 TABLET: 5; 325 TABLET ORAL at 05:16

## 2023-06-16 RX ADMIN — OXYCODONE AND ACETAMINOPHEN 1 TABLET: 5; 325 TABLET ORAL at 12:33

## 2023-06-16 RX ADMIN — SODIUM CHLORIDE: 9 INJECTION, SOLUTION INTRAVENOUS at 17:39

## 2023-06-16 RX ADMIN — HYDROMORPHONE HYDROCHLORIDE 0.25 MG: 1 INJECTION, SOLUTION INTRAMUSCULAR; INTRAVENOUS; SUBCUTANEOUS at 08:53

## 2023-06-16 RX ADMIN — SODIUM CHLORIDE, PRESERVATIVE FREE 10 ML: 5 INJECTION INTRAVENOUS at 08:39

## 2023-06-16 RX ADMIN — CLOPIDOGREL BISULFATE 75 MG: 75 TABLET ORAL at 08:39

## 2023-06-16 RX ADMIN — OXYCODONE AND ACETAMINOPHEN 1 TABLET: 5; 325 TABLET ORAL at 20:42

## 2023-06-16 RX ADMIN — ENOXAPARIN SODIUM 30 MG: 100 INJECTION SUBCUTANEOUS at 08:39

## 2023-06-16 RX ADMIN — SODIUM CHLORIDE: 9 INJECTION, SOLUTION INTRAVENOUS at 16:09

## 2023-06-16 RX ADMIN — SODIUM CHLORIDE, PRESERVATIVE FREE 10 ML: 5 INJECTION INTRAVENOUS at 20:42

## 2023-06-16 RX ADMIN — MIRTAZAPINE 15 MG: 15 TABLET, ORALLY DISINTEGRATING ORAL at 20:41

## 2023-06-16 ASSESSMENT — PAIN DESCRIPTION - ORIENTATION
ORIENTATION: RIGHT
ORIENTATION: RIGHT
ORIENTATION: LEFT

## 2023-06-16 ASSESSMENT — PAIN SCALES - GENERAL
PAINLEVEL_OUTOF10: 6
PAINLEVEL_OUTOF10: 6
PAINLEVEL_OUTOF10: 7
PAINLEVEL_OUTOF10: 9
PAINLEVEL_OUTOF10: 0
PAINLEVEL_OUTOF10: 7
PAINLEVEL_OUTOF10: 0
PAINLEVEL_OUTOF10: 10

## 2023-06-16 ASSESSMENT — PAIN - FUNCTIONAL ASSESSMENT
PAIN_FUNCTIONAL_ASSESSMENT: ACTIVITIES ARE NOT PREVENTED
PAIN_FUNCTIONAL_ASSESSMENT: PREVENTS OR INTERFERES WITH MANY ACTIVE NOT PASSIVE ACTIVITIES
PAIN_FUNCTIONAL_ASSESSMENT: ACTIVITIES ARE NOT PREVENTED

## 2023-06-16 ASSESSMENT — PAIN DESCRIPTION - LOCATION
LOCATION: ABDOMEN
LOCATION: CHEST
LOCATION: CHEST

## 2023-06-16 ASSESSMENT — PAIN DESCRIPTION - PAIN TYPE: TYPE: CHRONIC PAIN

## 2023-06-16 ASSESSMENT — PAIN DESCRIPTION - DESCRIPTORS
DESCRIPTORS: ACHING;CRUSHING;DISCOMFORT
DESCRIPTORS: ACHING
DESCRIPTORS: ACHING

## 2023-06-16 NOTE — DISCHARGE SUMMARY
ondansetron 4 MG tablet  Commonly known as: Zofran  Take 1 tablet by mouth every 8 hours as needed for Nausea           * This list has 2 medication(s) that are the same as other medications prescribed for you. Read the directions carefully, and ask your doctor or other care provider to review them with you. Objective Findings at Discharge:       BMP/CBC  Recent Labs     06/14/23  0610 06/15/23  1109 06/16/23  0035    132* 136   K 4.6 5.2* 4.7    99 103   CO2 20* 23 22   BUN 12 14 12   CREATININE 1.0 1.0 1.1   WBC 26.7* 24.0* 24.6*   HCT 27.5* 32.7* 31.8*    425 471*       IMAGING:  XR CHEST PORTABLE    Result Date: 6/13/2023  EXAMINATION: ONE X-RAY VIEW OF THE CHEST 6/12/2023 4:04 pm COMPARISON: 06/07/2023 chest x-ray and CT. HISTORY: ORDERING SYSTEM PROVIDED HISTORY:  3.5 hours post right upper lobe biopsy TECHNOLOGIST PROVIDED HISTORY: Reason for Exam:  3.5 hours post right upper lobe biopsy. Additional signs and symptoms:  NA Relevant Medical/Surgical History:  TB, hypertension FINDINGS: Large right upper lobe mass is again demonstrated. There is airspace disease surrounding the mass that may represent post biopsy changes or post obstructive consolidation. No definite evidence of pneumothorax. The left lung is stable in clear. Emphysematous changes in the lungs. Cardiomediastinal silhouette and bony thorax are unchanged. Stable right upper lobe mass with right upper lobe peripheral consolidation. No significant change since prior. No pneumothorax. XR CHEST PORTABLE    Result Date: 6/12/2023  EXAMINATION: ONE XRAY VIEW OF THE CHEST 6/7/2023 4:40 pm COMPARISON: 06/23/2022 chest x-ray. 04/27/2023 PET-CT.  HISTORY: ORDERING SYSTEM PROVIDED HISTORY: dyspnea TECHNOLOGIST PROVIDED HISTORY: Reason for exam:->dyspnea Reason for Exam: dyspnea FINDINGS: There is a large right upper lobe mass measuring 7.1 x 8.6 cm in size corresponding to the mass seen on recent prior

## 2023-06-16 NOTE — PROCEDURES
Report called to Regine Hernandez
unlabored.         LEFT THE ROOM: [id number]    REPORT GIVEN TO: Saeed Torres bedside RN via telephone @[phone]

## 2023-06-16 NOTE — H&P
Stairs to enter with rails  Entrance Stairs - Number of Steps: 20  Bathroom Shower/Tub: Tub/Shower unit  Bathroom Toilet: Standard  ADL Assistance: Independent  Homemaking Assistance: Independent  Ambulation Assistance: Independent  Transfer Assistance: Independent  Active : Yes    He    reports that he quit smoking about 3 months ago. His smoking use included cigarettes. He started smoking about 40 years ago. He has a 90.00 pack-year smoking history. He has quit using smokeless tobacco. He reports current alcohol use. He reports that he does not use drugs. Prior (baseline) level of function: Independent with mobility and self-care. Current level of function: Minimum to moderate physical assistance and verbal cues needed for mobility and self-care. Allergies:  Patient has no known allergies.     Past Medical History:   Past Medical History:   Diagnosis Date    Hypertension     TB (pulmonary tuberculosis) 1990        Past Surgical History:     Past Surgical History:   Procedure Laterality Date    CT NEEDLE BIOPSY LUNG PERCUTANEOUS  6/12/2023    CT NEEDLE BIOPSY LUNG PERCUTANEOUS 6/12/2023 [facility] CT SCAN       Current Medications:     Current Facility-Administered Medications:     acetaminophen (TYLENOL) tablet 650 mg, 650 mg, Oral, Q4H PRN, C Amber Suarez MD    bisacodyl (DULCOLAX) suppository 10 mg, 10 mg, Rectal, Daily PRN, C Amber Suarez MD    0.9 % sodium chloride infusion, , IntraVENous, PRN, Bart Lau MD    [START ON 6/17/2023] enoxaparin Sodium (LOVENOX) injection 30 mg, 30 mg, SubCUTAneous, Daily, Bart Lau MD    sodium chloride flush 0.9 % injection 5-40 mL, 5-40 mL, IntraVENous, BID, Bart Lau MD    sodium chloride flush 0.9 % injection 5-40 mL, 5-40 mL, IntraVENous, PRN, Bart Lau MD    sodium chloride flush 0.9 % injection 10 mL, 10 mL, IntraVENous, PRN, Rashaun Clemons DO    albuterol sulfate HFA (PROVENTIL;VENTOLIN;PROAIR) 108 (90 Base)

## 2023-06-16 NOTE — CARE COORDINATION
Chief Complaint   Patient presents with   • Well Child     12 year old physical       Nicolas Duenas male 12 y.o. 7 m.o.      History was provided by the mother.    Immunization History   Administered Date(s) Administered   • DTaP / Hep B / IPV 2011, 2011, 2011   • DTaP / IPV 02/25/2015   • DTaP, Unspecified 05/14/2012   • Hep A, 2 Dose 02/13/2012, 08/22/2012   • Hep B, Adolescent or Pediatric 2011   • Hib (PRP-OMP) 2011, 2011, 02/13/2012   • Influenza Quad Vaccine (Inpatient) 2011, 2011   • MMR 05/14/2012   • MMRV 02/25/2015   • Pneumococcal Conjugate 13-Valent (PCV13) 2011, 2011, 2011, 02/13/2012   • Rotavirus Monovalent 2011, 2011   • Varicella 05/14/2012       The following portions of the patient's history were reviewed and updated as appropriate: allergies, current medications, past family history, past medical history, past social history, past surgical history and problem list.     Current Outpatient Medications   Medication Sig Dispense Refill   • famotidine (Pepcid) 40 mg/5 mL suspension Take 2.5 mL by mouth 2 (Two) Times a Day. 150 mL 6   • ibuprofen (ADVIL,MOTRIN) 100 MG/5ML suspension Take 5 mL by mouth Every 6 (Six) Hours As Needed for Mild Pain (1-3) for up to 15 doses. 150 mL 0   • polyethylene glycol (MIRALAX) 17 g packet Take 17 g by mouth 2 (Two) Times a Day. 1 capful bid for 4-5 days then 1 capful daily for 1 month 60 packet 6     No current facility-administered medications for this visit.       No Known Allergies      Current Issues:  Current concerns include none    Review of Nutrition:    Balanced diet? yes  Exercise: yes  Dentist: yes    Social Screening:  Discipline concerns? no  Concerns regarding behavior with peers? no  School performance: doing well; no concerns  thGthrthathdtheth:th th7th Secondhand smoke exposure? no    Helmet Use:  yes  Seat Belt Use: yes  Sunscreen Use:  yes  Smoke Detectors:  yes    Review of  Received approval for admission to ARU. Auth# [id number]. Notified MD and CM of approval.     Patient meets criteria and is approved to come to ARU. Patient able to admit once medically stable and after ARU Medical Director and  sign the pre-admission screen (PAS). Will have a bed for patient on ARU today. No rapid COVID needed. "Systems           /68   Ht 147 cm (57.87\")   Wt 45 kg (99 lb 4 oz)   BMI 20.83 kg/m²          Physical Exam  Constitutional:       General: He is active.   HENT:      Right Ear: Tympanic membrane normal.      Left Ear: Tympanic membrane normal.      Mouth/Throat:      Mouth: Mucous membranes are moist.      Pharynx: Oropharynx is clear.   Eyes:      Conjunctiva/sclera: Conjunctivae normal.      Pupils: Pupils are equal, round, and reactive to light.      Comments: RR + both eyes   Cardiovascular:      Rate and Rhythm: Normal rate and regular rhythm.      Heart sounds: S1 normal and S2 normal.   Pulmonary:      Effort: Pulmonary effort is normal.      Breath sounds: Normal breath sounds.   Abdominal:      General: Bowel sounds are normal.      Palpations: Abdomen is soft.   Musculoskeletal:         General: Normal range of motion.      Cervical back: Neck supple.      Thoracic back: Normal.      Lumbar back: Normal.      Comments: No scoliosis   Lymphadenopathy:      Cervical: No cervical adenopathy.   Skin:     General: Skin is warm and dry.      Findings: No rash.   Neurological:      Mental Status: He is alert.      Cranial Nerves: No cranial nerve deficit.      Motor: No abnormal muscle tone.                 Healthy 12 y.o.  well child.        1. Anticipatory guidance discussed.      The patient and parent(s) were instructed in water safety, burn safety, firearm safety, and stranger safety.  Helmet use was indicated for any bike riding, scooter, rollerblades, skateboards, or skiing. They were instructed that children should sit  in the back seat of the car, if there is an air bag, until age 13.  Encouraged annual dental visits and appropriate dental hygiene.  Encouraged participation in household chores. Recommended limiting screen time to <2hrs daily and encouraging at least one hour of active play daily.  If participating in sports, use proper personal safety equipment.    Age appropriate counseling " provided on smoking, alcohol use, illicit drug use, and sexual activity.    2.  Weight management:  The patient was counseled regarding nutrition and physical activity.    3. Development: appropriate for age    4.Immunizations: discussed risk/benefits to vaccination, reviewed components of the vaccine, discussed VIS, discussed informed consent and informed consent obtained. Patient was allowed ot accept or refuse vaccine. Questions answered to satisfactory state of patient. We reviewed typical age appropriate and seasonally appropriate vaccinations. Reviewed immunization history and updated state vaccination form as needed.        Diagnoses and all orders for this visit:    1. Encounter for well child visit at 12 years of age (Primary)  -     POC Hemoglobin  -     POC Cholesterol  -     Meningococcal Conjugate Vaccine 4-Valent IM  -     Tdap Vaccine Greater Than or Equal To 8yo IM          Return in about 1 year (around 9/27/2023).

## 2023-06-16 NOTE — PROGRESS NOTES
06/13/23 3515   Encounter Summary   Encounter Overview/Reason  Initial Encounter   Service Provided For: Patient   Referral/Consult From: [address] Family members   Last Encounter  06/13/23  (Offered prayer and spiritual support. Patient does not have a [employer] but believe in God and hope to be discharged soon.)   Complexity of Encounter Low   Begin Time 0645   End Time  0927   Total Time Calculated 8 min   Encounter    Type Initial Screen/Assessment   Spiritual/Emotional needs   Type Spiritual Support   Assessment/Intervention/Outcome   Assessment Hopeful;Peaceful   Intervention Active listening;Discussed belief system/Temple practices/agnes;Discussed illness injury and its impact; Discussed relationship with God;Empowerment;Nurtured Hope;Prayer (assurance of)/[place];Sustaining Presence/Ministry of presence   Outcome Engaged in conversation;Encouraged;Expressed Gratitude   Plan and Referrals   Plan/Referrals No future visits requested
4 Eyes Skin Assessment     NAME:  Kristen Winter  YOB: 1965  MEDICAL RECORD NUMBER:  [id number]    The patient is being assessed for  Transfer to New Unit from  to 4N    I agree that at least one RN has performed a thorough Head to Toe Skin Assessment on the patient. ALL assessment sites listed below have been assessed. Areas assessed by both nurses:    Head, Face, Ears, Shoulders, Back, Chest, Arms, Elbows, Hands, Sacrum. Buttock, Coccyx, Ischium, Legs. Feet and Heels, and Under Medical Devices         Does the Patient have a Wound? No noted wound(s) redness on buttocks blanchable       Cuco Prevention initiated by RN: No  Wound Care Orders initiated by RN: No    Pressure Injury (Stage 3,4, Unstageable, DTI, NWPT, and Complex wounds) if present, place Wound referral order by RN under : No    New Ostomies, if present place, Ostomy referral order under : No     Nurse 1 eSignature: Electronically signed by Ervin Hudson.  NOAM Hernandez, RN on 6/16/23 at 6:21 AM EDT    **SHARE this note so that the co-signing nurse can place an eSignature**    Nurse 2 eSignature: Electronically signed by Gerardo Pavon LPN on 1/87/78 at 5:28 AM EDT
Belongings gathered. IV in place.  Pt to 1020 by w/c
Comprehensive Nutrition Assessment    Type and Reason for Visit:  Reassess    Nutrition Recommendations/Plan:   Continue current diet and supplements. Continue encouraging and recording PO Intake TID      Malnutrition Assessment:  Malnutrition Status:  Severe malnutrition (06/08/23 1219)    Context:  Acute Illness     Findings of the 6 clinical characteristics of malnutrition:  Energy Intake:  50% or less of estimated energy requirements for 5 or more days (1 month)  Weight Loss:  Greater than 5% over 1 month (~20%)     Body Fat Loss:   (Severe fat loss) Orbital, Triceps   Muscle Mass Loss:   (severe muscle mass loss) Scapula (trapezius), Clavicles (pectoralis & deltoids), Calf (gastrocnemius), Temples (temporalis), Thigh (quadraceps)  Fluid Accumulation:  Unable to assess     Strength:  Not Performed    Nutrition Assessment:    Pt s/p biopsy of lung mass today. Continues on low sodium diet, with greater than 75% intake of meals yesterday. Has been started on appetite stimulant (remeron). Nutrition Related Findings:    C/o generalized weakness, fatigue, unable to ambulation much at home - noted poor tolerance of PT during IP stay. Wound Type: None       Current Nutrition Intake & Therapies:    Average Meal Intake: %  Average Supplements Intake: Unable to assess  ADULT DIET; Regular; Low Sodium (2 gm)  ADULT ORAL NUTRITION SUPPLEMENT; Breakfast, Lunch, Dinner; Standard High Calorie/High Protein Oral Supplement  ADULT ORAL NUTRITION SUPPLEMENT; Lunch, Dinner; Frozen Oral Supplement  ADULT DIET; Regular    Anthropometric Measures:  Height: 5' 9\" (175.3 cm)  Ideal Body Weight (IBW): 160 lbs (73 kg)    Admission Body Weight: 104 lb 0.9 oz (47.2 kg)  Current Body Weight: 103 lb (46.7 kg), 65 % IBW.  Weight Source: Bed Scale  Current BMI (kg/m2): 15.2  Usual Body Weight: 130 lb (59 kg) (April 2023 OV, noted pt started loss of appetite and wt loss 1 month ago)  % Weight Change (Calculated): -20  Weight
HEMATOLOGY ONCOLOGY  Progress Note    Patient Name:  Crystal Don  Patient :  1965  Patient MRN:  [id number]     Date of Service: 2023    HPI:   Crystal Don is a pleasant 63 y.o. male who presents ED on 2023 with c/o SOB. He was cyanotic. In 2023 he was referred from ED to us for lung masses. He was seen by IR for CT guided bx and refused. Then he was seen by Dr Christin Ryan for [facility] +/- [facility]. He mentioned that he passed out yesterday. He seems to be confused. 3/27/2023:  1. No pulmonary embolism identified. 2. Severe emphysema with large mass of the right upper lobe which has  significantly increased in size when compared with prior CT. This now  measures 4.9 x 6.1 x 6.3 cm compared with 1.5 x 2.0 cm on exam from 2022. Findings most compatible with malignancy. Correlate with patient's history. 3. Large in left upper lobe nodule also present which now measures 1.6 x 11.1 cm compared with 0.9 x 1.3 cm on prior exam.    2023 PET:  1. Intense activity associated with the right upper lobe mass described on  the recent CT scan, concerning for primary lung cancer. Prominent central  photopenia suggests a large area of central necrosis. 2.  Mild activity associated with the left upper lobe nodule described  previously. This could also be due to a primary lung cancer, possibly  low-grade given the low activity. This could also be related to an  infectious or inflammatory process. Metastatic disease from the right upper  lobe mass is less likely given the relative lack of growth and mild activity. 3.  Mild activity associated with a nodular opacity in the superior segment  of the left lower lobe, also potentially infectious or inflammatory. Neoplastic process is not excluded. 2023 CT chest: No pulmonary embolism. Enlarging right upper lobe centrally necrotic mass suggesting progression of primary malignancy.   There is peripheral airspace consolidation
HEMATOLOGY ONCOLOGY  Progress Note    Patient Name:  Naren Jones  Patient :  1965  Patient MRN:  [id number]     Date of Service: 6/15/2023    HPI:   Naren Jones is a pleasant 63 y.o. male who presents ED on 2023 with c/o SOB. He was cyanotic. In 2023 he was referred from ED to us for lung masses. He was seen by IR for CT guided bx and refused. Then he was seen by Dr Nawaf Bonds for [facility] +/- EB[facility]. He mentioned that he passed out yesterday. He seems to be confused. 3/27/2023:  1. No pulmonary embolism identified. 2. Severe emphysema with large mass of the right upper lobe which has  significantly increased in size when compared with prior CT. This now  measures 4.9 x 6.1 x 6.3 cm compared with 1.5 x 2.0 cm on exam from 2022. Findings most compatible with malignancy. Correlate with patient's history. 3. Large in left upper lobe nodule also present which now measures 1.6 x 11.1 cm compared with 0.9 x 1.3 cm on prior exam.    2023 PET:  1. Intense activity associated with the right upper lobe mass described on  the recent CT scan, concerning for primary lung cancer. Prominent central  photopenia suggests a large area of central necrosis. 2.  Mild activity associated with the left upper lobe nodule described  previously. This could also be due to a primary lung cancer, possibly  low-grade given the low activity. This could also be related to an  infectious or inflammatory process. Metastatic disease from the right upper  lobe mass is less likely given the relative lack of growth and mild activity. 3.  Mild activity associated with a nodular opacity in the superior segment  of the left lower lobe, also potentially infectious or inflammatory. Neoplastic process is not excluded. 2023 CT chest: No pulmonary embolism. Enlarging right upper lobe centrally necrotic mass suggesting progression of primary malignancy.   There is peripheral airspace consolidation
HEMATOLOGY ONCOLOGY  Progress Note    Patient Name:  Nina Dick  Patient :  1965  Patient MRN:  [id number]     Date of Service: 2023    HPI:   Nina Dick is a pleasant 63 y.o. male who presents ED on 2023 with c/o SOB. He was cyanotic. In 2023 he was referred from ED to us for lung masses. He was seen by IR for CT guided bx and refused. Then he was seen by Dr Bruna Santillan for [facility] +/- EB[facility]. He mentioned that he passed out yesterday. He seems to be confused. 3/27/2023:  1. No pulmonary embolism identified. 2. Severe emphysema with large mass of the right upper lobe which has  significantly increased in size when compared with prior CT. This now  measures 4.9 x 6.1 x 6.3 cm compared with 1.5 x 2.0 cm on exam from 2022. Findings most compatible with malignancy. Correlate with patient's history. 3. Large in left upper lobe nodule also present which now measures 1.6 x 11.1 cm compared with 0.9 x 1.3 cm on prior exam.    2023 PET:  1. Intense activity associated with the right upper lobe mass described on  the recent CT scan, concerning for primary lung cancer. Prominent central  photopenia suggests a large area of central necrosis. 2.  Mild activity associated with the left upper lobe nodule described  previously. This could also be due to a primary lung cancer, possibly  low-grade given the low activity. This could also be related to an  infectious or inflammatory process. Metastatic disease from the right upper  lobe mass is less likely given the relative lack of growth and mild activity. 3.  Mild activity associated with a nodular opacity in the superior segment  of the left lower lobe, also potentially infectious or inflammatory. Neoplastic process is not excluded. 2023 CT chest: No pulmonary embolism. Enlarging right upper lobe centrally necrotic mass suggesting progression of primary malignancy.   There is peripheral airspace consolidation
Nephrology Progress Note  6/12/2023 5:30 PM  Subjective: Interval History: Ari Dorsey is a 63 y.o. male doing better awaiting on plans for biopsy      Data:   Scheduled Meds:   [Held by provider] clopidogrel  75 mg Oral Daily    atorvastatin  40 mg Oral QPM    budesonide-formoterol  2 puff Inhalation BID    mirtazapine  15 mg Oral Nightly    sodium chloride flush  5-40 mL IntraVENous BID    enoxaparin  30 mg SubCUTAneous Daily    piperacillin-tazobactam  3,375 mg IntraVENous Q8H     Continuous Infusions:   sodium chloride      sodium chloride 50 mL/hr at 06/11/23 2049         CBC   Recent Labs     06/10/23  0500 06/11/23  0121 06/12/23  0315   WBC 27.5* 23.1* 28.8*   HGB 7.9* 8.7* 8.8*   HCT 25.0* 27.8* 29.9*    336 365      BMP   Recent Labs     06/10/23  0500 06/10/23  1248 06/11/23  0121 06/12/23  0315    135 138 136   K 2.9* 4.0 3.7 4.5    102 106 104   CO2 23 24 24 23   PHOS 0.9*  --   --   --    BUN 13 13 15 12   CREATININE 0.9 0.9 0.9 0.9     Hepatic:   No results for input(s): AST, ALT, ALB, BILITOT, ALKPHOS in the last 72 hours. Troponin: No results for input(s): TROPONINI in the last 72 hours. BNP: No results for input(s): BNP in the last 72 hours. Lipids: No results for input(s): CHOL, HDL in the last 72 hours. Invalid input(s): LDLCALCU  ABGs: No results found for: PHART, PO2ART, VRH9LTC  INR:   No results for input(s): INR in the last 72 hours.     Renal Labs  Albumin:    Lab Results   Component Value Date/Time    LABALBU 18 06/11/2023 04:30 PM     Calcium:    Lab Results   Component Value Date/Time    CALCIUM 10.4 06/12/2023 03:15 AM     Phosphorus:    Lab Results   Component Value Date/Time    PHOS 0.9 06/10/2023 05:00 AM     U/A:    Lab Results   Component Value Date/Time    NITRU NEGATIVE 06/07/2023 08:23 PM    COLORU YELLOW 06/07/2023 08:23 PM    WBCUA 3 06/07/2023 08:23 PM    RBCUA <1 06/07/2023 08:23 PM    MUCUS RARE 06/07/2023 08:23 PM    TRICHOMONAS NONE SEEN
Nephrology Progress Note  6/13/2023 2:29 PM  Subjective: Interval History: Nilda Lazo is a 63 y.o. male doing well status post lung biopsy      Data:   Scheduled Meds:   [Held by provider] clopidogrel  75 mg Oral Daily    atorvastatin  40 mg Oral QPM    budesonide-formoterol  2 puff Inhalation BID    mirtazapine  15 mg Oral Nightly    sodium chloride flush  5-40 mL IntraVENous BID    enoxaparin  30 mg SubCUTAneous Daily    piperacillin-tazobactam  3,375 mg IntraVENous Q8H     Continuous Infusions:   sodium chloride      sodium chloride 50 mL/hr at 06/12/23 2028         CBC   Recent Labs     06/11/23  0121 06/12/23  0315 06/13/23  0921   WBC 23.1* 28.8* 28.5*   HGB 8.7* 8.8* 9.1*   HCT 27.8* 29.9* 30.4*    365 398      BMP   Recent Labs     06/11/23  0121 06/12/23 0315 06/13/23  0921    136 137   K 3.7 4.5 4.3    104 103   CO2 24 23 26   BUN 15 12 12   CREATININE 0.9 0.9 1.0     Hepatic:   No results for input(s): AST, ALT, ALB, BILITOT, ALKPHOS in the last 72 hours. Troponin: No results for input(s): TROPONINI in the last 72 hours. BNP: No results for input(s): BNP in the last 72 hours. Lipids: No results for input(s): CHOL, HDL in the last 72 hours. Invalid input(s): LDLCALCU  ABGs: No results found for: PHART, PO2ART, CWJ5UAC  INR:   No results for input(s): INR in the last 72 hours.     Renal Labs  Albumin:    Lab Results   Component Value Date/Time    LABALBU 18 06/11/2023 04:30 PM     Calcium:    Lab Results   Component Value Date/Time    CALCIUM 10.9 06/13/2023 09:21 AM     Phosphorus:    Lab Results   Component Value Date/Time    PHOS 0.9 06/10/2023 05:00 AM     U/A:    Lab Results   Component Value Date/Time    NITRU NEGATIVE 06/07/2023 08:23 PM    COLORU YELLOW 06/07/2023 08:23 PM    WBCUA 3 06/07/2023 08:23 PM    RBCUA <1 06/07/2023 08:23 PM    MUCUS RARE 06/07/2023 08:23 PM    TRICHOMONAS NONE SEEN 06/07/2023 08:23 PM    BACTERIA NEGATIVE 06/07/2023 08:23 PM    CLARITYU
Nephrology Progress Note  6/14/2023 2:33 PM  Subjective: Interval History: Triny Riley is a 63 y.o. male doing okay mild shortness of breath or chest pain    Data:   Scheduled Meds:   clopidogrel  75 mg Oral Daily    atorvastatin  40 mg Oral QPM    budesonide-formoterol  2 puff Inhalation BID    mirtazapine  15 mg Oral Nightly    sodium chloride flush  5-40 mL IntraVENous BID    enoxaparin  30 mg SubCUTAneous Daily    piperacillin-tazobactam  3,375 mg IntraVENous Q8H     Continuous Infusions:   sodium chloride 10 mL/hr at 06/14/23 0859         CBC   Recent Labs     06/12/23  0315 06/13/23  0921 06/14/23  0610   WBC 28.8* 28.5* 26.7*   HGB 8.8* 9.1* 8.1*   HCT 29.9* 30.4* 27.5*    398 413      BMP   Recent Labs     06/12/23 0315 06/13/23  0921 06/14/23  0610    137 135   K 4.5 4.3 4.6    103 104   CO2 23 26 20*   BUN 12 12 12   CREATININE 0.9 1.0 1.0     Hepatic:   No results for input(s): AST, ALT, ALB, BILITOT, ALKPHOS in the last 72 hours. Troponin: No results for input(s): TROPONINI in the last 72 hours. BNP: No results for input(s): BNP in the last 72 hours. Lipids: No results for input(s): CHOL, HDL in the last 72 hours. Invalid input(s): LDLCALCU  ABGs: No results found for: PHART, PO2ART, HCH8COV  INR:   No results for input(s): INR in the last 72 hours.     Renal Labs  Albumin:    Lab Results   Component Value Date/Time    LABALBU 18 06/11/2023 04:30 PM     Calcium:    Lab Results   Component Value Date/Time    CALCIUM 11.5 06/14/2023 06:10 AM     Phosphorus:    Lab Results   Component Value Date/Time    PHOS 0.9 06/10/2023 05:00 AM     U/A:    Lab Results   Component Value Date/Time    NITRU NEGATIVE 06/07/2023 08:23 PM    COLORU YELLOW 06/07/2023 08:23 PM    WBCUA 3 06/07/2023 08:23 PM    RBCUA <1 06/07/2023 08:23 PM    MUCUS RARE 06/07/2023 08:23 PM    TRICHOMONAS NONE SEEN 06/07/2023 08:23 PM    BACTERIA NEGATIVE 06/07/2023 08:23 PM    CLARITYU CLEAR 06/07/2023 08:23 PM
Nephrology Progress Note  6/15/2023 1:25 PM  Subjective: Interval History: Serenity Robert is a 63 y.o. male  awake 9 diagnosed with lung cancer somewhat more tired now and calcium noted to be elevated as well    Data:   Scheduled Meds:   clopidogrel  75 mg Oral Daily    atorvastatin  40 mg Oral QPM    budesonide-formoterol  2 puff Inhalation BID    mirtazapine  15 mg Oral Nightly    sodium chloride flush  5-40 mL IntraVENous BID    enoxaparin  30 mg SubCUTAneous Daily     Continuous Infusions:   sodium chloride 100 mL/hr at 06/15/23 1235    sodium chloride 10 mL/hr at 06/14/23 0859         CBC   Recent Labs     06/13/23  0921 06/14/23  0610 06/15/23  1109   WBC 28.5* 26.7* 24.0*   HGB 9.1* 8.1* 9.6*   HCT 30.4* 27.5* 32.7*    413 425      BMP   Recent Labs     06/13/23  0921 06/14/23  0610 06/15/23  1109    135 132*   K 4.3 4.6 5.2*    104 99   CO2 26 20* 23   BUN 12 12 14   CREATININE 1.0 1.0 1.0     Hepatic:   No results for input(s): AST, ALT, ALB, BILITOT, ALKPHOS in the last 72 hours. Troponin: No results for input(s): TROPONINI in the last 72 hours. BNP: No results for input(s): BNP in the last 72 hours. Lipids: No results for input(s): CHOL, HDL in the last 72 hours. Invalid input(s): LDLCALCU  ABGs: No results found for: PHART, PO2ART, DNG9FDC  INR:   No results for input(s): INR in the last 72 hours.     Renal Labs  Albumin:    Lab Results   Component Value Date/Time    LABALBU 18 06/11/2023 04:30 PM     Calcium:    Lab Results   Component Value Date/Time    CALCIUM 13.2 06/15/2023 11:09 AM     Phosphorus:    Lab Results   Component Value Date/Time    PHOS 0.9 06/10/2023 05:00 AM     U/A:    Lab Results   Component Value Date/Time    NITRU NEGATIVE 06/07/2023 08:23 PM    COLORU YELLOW 06/07/2023 08:23 PM    WBCUA 3 06/07/2023 08:23 PM    RBCUA <1 06/07/2023 08:23 PM    MUCUS RARE 06/07/2023 08:23 PM    TRICHOMONAS NONE SEEN 06/07/2023 08:23 PM    BACTERIA NEGATIVE 06/07/2023
Nephrology Progress Note  6/16/2023 3:27 PM  Subjective: Interval History: Crystal Don is a 63 y.o. male doing okay more awake after calcium improved  Data:   Scheduled Meds:   clopidogrel  75 mg Oral Daily    atorvastatin  40 mg Oral QPM    budesonide-formoterol  2 puff Inhalation BID    mirtazapine  15 mg Oral Nightly    sodium chloride flush  5-40 mL IntraVENous BID    enoxaparin  30 mg SubCUTAneous Daily     Continuous Infusions:   sodium chloride 100 mL/hr at 06/16/23 1059    sodium chloride 10 mL/hr at 06/14/23 0859         CBC   Recent Labs     06/14/23  0610 06/15/23  1109 06/16/23  0035   WBC 26.7* 24.0* 24.6*   HGB 8.1* 9.6* 9.4*   HCT 27.5* 32.7* 31.8*    425 471*      BMP   Recent Labs     06/14/23  0610 06/15/23  1109 06/16/23  0035    132* 136   K 4.6 5.2* 4.7    99 103   CO2 20* 23 22   BUN 12 14 12   CREATININE 1.0 1.0 1.1     Hepatic:   No results for input(s): AST, ALT, ALB, BILITOT, ALKPHOS in the last 72 hours. Troponin: No results for input(s): TROPONINI in the last 72 hours. BNP: No results for input(s): BNP in the last 72 hours. Lipids: No results for input(s): CHOL, HDL in the last 72 hours. Invalid input(s): LDLCALCU  ABGs: No results found for: PHART, PO2ART, AGZ0ADB  INR:   No results for input(s): INR in the last 72 hours.     Renal Labs  Albumin:    Lab Results   Component Value Date/Time    LABALBU 18 06/11/2023 04:30 PM     Calcium:    Lab Results   Component Value Date/Time    CALCIUM 11.9 06/16/2023 12:35 AM     Phosphorus:    Lab Results   Component Value Date/Time    PHOS 0.9 06/10/2023 05:00 AM     U/A:    Lab Results   Component Value Date/Time    NITRU NEGATIVE 06/07/2023 08:23 PM    COLORU YELLOW 06/07/2023 08:23 PM    WBCUA 3 06/07/2023 08:23 PM    RBCUA <1 06/07/2023 08:23 PM    MUCUS RARE 06/07/2023 08:23 PM    TRICHOMONAS NONE SEEN 06/07/2023 08:23 PM    BACTERIA NEGATIVE 06/07/2023 08:23 PM    CLARITYU CLEAR 06/07/2023 08:23 PM    SPECGRAV
Note deleted.
Occupational Therapy Treatment Note  Name: Mary Recinos MRN: [id number] :   1965   Date:  2023   Admission Date: 2023 Room:  -A     Primary Problem:  The primary encounter diagnosis was Dyspnea, unspecified type. Diagnoses of History of lung cancer, Lung mass, and Pneumonia due to infectious organism, unspecified laterality, unspecified part of lung were also pertinent to this visit. Past Medical History:   Diagnosis Date    Hypertension     TB (pulmonary tuberculosis)          Communication with other providers:  RN notified of pt reported pain     Subjective:  Patient states: \"My R side is just still acting up\"  Pain: reported 10/10 pain with mobility   Restrictions: general, fall   No one at bedside    Objective:    Observation:  pt was in bed upon arrival sleeping and easily arousable, agreeable to session    Treatment, including education:    Therapeutic Activity Training:   Therapeutic activity training was instructed today. Cues were given for safety, sequence, UE/LE placement, visual cues, and balance. Activities performed today included:    Bed mobility:  Pt completed sup to sit with SBA and inc time d/t pain. Scooting:  Pt completed scooting hips anterior to EOB SBA. Seated balance:  Pt demo good seated balance at EOB SBA. STS:  Pt completed from EOB and to recliner CGA-SBA. Standing balance:  Pt demo fair+ standing balance without AD CGA. SPT:  Pt completed ambulatory SPT ~4' CGA without AD. Pt declined further mobility d/t pain and fatigue. Pt declined need for ADLs at this time d/t reportedly just completing. Activities completed in prep for improved occupational performance.        Education: Role of OT, OT POC, safety, benefits of EOB/OOB activity, rationale for treatment  Safety Measures: Gait belt used for safety of pt and therapist, Left in recliner, Alarm in place, call light and phone within reach, lines managed, needs met     Assessment /
Physical Therapy    Physical Therapy Treatment Note  Name: Loco Young MRN: [id number] :   1965   Date:  2023   Admission Date: 2023 Room:  -A   Restrictions/Precautions:  general precautions, falls  Communication with other providers:  RN  Subjective:  Patient states:  \"I'm really cold\"  Pain:   Location, Type, Intensity (0/10 to 10/10):  10/10 pain in chest when ambulating  Objective:    Observation:  Pt supine in bed and cooperative with therapy on this date    Treatment, including education/measures:  Bed mobility: transitioned from supine to sit w/ SBA, bedrail    Sitting balance: good static and light dynamic sitting balance at EOB, SBA    Transfers:   Sit to stand: from EOB w/ SBA, VCs for sequencing  Stand to sit: from standing w/o AD to seated in recliner, CGA. VCs for sequencing and UE placement    Gait: ambulated 20ft w/ CGA. Demonstrated dec pace, dec step length, and dec step height. Pt also appeared off balance, but was able to self correct w/o any major LOB. After ambulating 20ft, reported 10/10 pain in chest requiring him to sit in recliner to return to room.  bpm. With sitting down, HR returned to baseline of 101 bpm, and reported pain subsided some. Pt stated this has happened once before when getting up to walk. Notified RN and waited w/ pt until RN entered room. Education: educated on PT POC, role of PT, and discharge planning. Assessment / Impression:    Pt remained seated in recliner at end of session with chair alarm and call button and room phone within reach and RN in room. Patient's tolerance of treatment:  Poor   Adverse Reaction: None  Significant change in status and impact:  decreased tolerance for therapy d/t pain, demonstrated unsteadiness on feet when walking.   Barriers to improvement:  pain, dec strength, dec endurance, dec balance, medical hx of cancer  Plan for Next Session:    Continue to address bed mobility, transfers, balance, gait
Physical Therapy  Attempted to see pt. Pt states he would like to skip today d/t just finishing eating and feeling worn out today. Will cont. Sanjuanita Colon.  Renay Hull PTA
Physician Progress Note      Thao Arnold  CSN #:                  [id number]  :                       1965  ADMIT DATE:       2023 3:54 PM  [address] [place] DATE:  RESPONDING  PROVIDER #:        Fernando Owens MD          QUERY TEXT:    Pt admitted with SOB. Pt noted to be cyanotic on admission. If possible,   please document in the progress notes and discharge summary if you are   evaluating and/or treating any of the following: The medical record reflects the following:  Risk Factors: Lung mass, PNA  Clinical Indicators: Documented in the ED note that patient was cyanotic on   arrival and was sating 82% patient was placed on 4L O2 and does not normally   wear O2 at home. ED documented respiratory distress, tachypnea, RR >20 up to   25  Treatment: O2 4L, Pulmonology consult    Thank you,  Adamaris Fiore RN CDS Supervisor  [phone]  Options provided:  -- Acute respiratory failure with hypoxia  -- Other - I will add my own diagnosis  -- Disagree - Not applicable / Not valid  -- Disagree - Clinically unable to determine / Unknown  -- Refer to Clinical Documentation Reviewer    PROVIDER RESPONSE TEXT:    This patient is in acute respiratory failure with hypoxia.     Query created by: Lucy Finley on 2023 1:35 PM      Electronically signed by:  Fernando Owens MD 2023 3:01 PM
Report called to 4N nurse, patient aware transfer to room 9682
V2.0  [facility] [place] Hospitalist Progress Note      Name:  Ubaldo Leyva /Age/Sex: 1965  (63 y.o. male)   MRN & CSN:  [id number] & [id number] Encounter Date/Time: 2023 3:00 PM EDT    Location:  -A PCP: No primary care provider on file. Hospital Day: 6    Assessment and Plan:   Ubaldo Leyva is a 63 y.o. male with pmh of hypertension, hyperlipidemia, ischemic CVA, COPD/emphysema, right lung mass who presents with Pneumonia due to infectious organism      Plan:    Acute metabolic encephalopathy  Patient altered mentation and was unable to make informed decisions related admission. This is likely back to factorial in the meantime is aware. Hypercalcemia as well as sepsis. Mentation greatly improved    Severe Sepsis present on admission  Likely secondary to post obstructive pneumonia  In the setting of Right upper Lobe Mass (no tissue diagnosis yet)  Although lactate criteria for septic shock is met, believe underlying malignancy and poor intake for more than a month is contributing. Received Vancomycin and Cefepime in ED as part of sepsis bundle  Blood cultures pending  Sputum culture ordered  Continue Zosyn  Consulted hematology/oncology for further recs who recommended consulting pulmonology for bronchoscopy and biopsy. Consult placed to pulmonology. Patient agreeable to plan for IR guided biopsy    Right upper lobe mass  Patient has multiple medical devices that are concerning for possible  Malignancy.   IR consulted: Patient no longer confused from his hypercalcemia is agreeable to biopsy  Pulmonology     SEAN  Hypokalemia  Secondary to poor PO intake and underlying infection  IV hydration   Potassium replacement PO and IV  Creatinine on presentation 1.4 mg/DL, improved to 1.2 NG/DL after fluid resuscitation  Monitor BMP     Hypercalcemia  14.9 on presentation  Likely related to malignancy  IV fluids nephrology     Severe malnutrition  Dietitian consulted  Patient has lost appetite and has
V2.0  [facility] [place] Hospitalist Progress Note      Name:  Maye Adam /Age/Sex: 1965  (63 y.o. male)   MRN & CSN:  [id number] & [id number] Encounter Date/Time: 2023 3:00 PM EDT    Location:  -A PCP: No primary care provider on file. Hospital Day: 8    Assessment and Plan:   Maye Adam is a 63 y.o. male with pmh of hypertension, hyperlipidemia, ischemic CVA, COPD/emphysema, right lung mass who presents with Pneumonia due to infectious organism      Plan:    Acute metabolic encephalopathy - Resolved   Patient altered mentation and was unable to make informed decisions related admission. This is likely back to factorial in the meantime is aware. Hypercalcemia as well as sepsis. 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 Received Vancomycin and Cefepime in ED as part of sepsis bundle  Blood cultures -ve  Sputum culture ordered but never done. Continue Zosyn IV   Consulted hematology/oncology for further recs who recommended consulting pulmonology for bronchoscopy and biopsy. Consult placed to pulmonology. Right upper lobe mass  Patient has multiple medical devices that are concerning for possible  Malignancy.   IR consulted: Patient no longer confused from his hypercalcemia is agreeable to biopsy  Pulmonology     SEAN  Hypokalemia  Secondary to poor PO intake and underlying infection  IV hydration   Potassium replacement PO and IV  Creatinine on presentation 1.4 mg/DL, improved to 1.0 after fluid resuscitation  Monitor BMP       Leukocytosis 2/2 likely a combination of Pneumonia and Reactionary     Hypercalcemia  14.9 on presentation  Likely related to malignancy  IV
[place]2.[place]  [facility] [place]      Name:  Champ Ramos /Age/Sex: 1965  (63 y.o. male)   MRN & CSN:  [id number] & [id number] Encounter Date/Time: 6/15/2023 3:00 PM EDT    Location:  -A PCP: No primary care provider on file. Hospital Day: 9    Assessment and Plan:   Champ Ramos is a 63 y.o. male with pmh of hypertension, hyperlipidemia, ischemic CVA, COPD/emphysema, right lung mass who presents with Pneumonia due to infectious organism      Plan:    Acute metabolic encephalopathy - Resolved   Patient altered mentation and was unable to make informed decisions related admission. This is likely multifactorial-  Hypercalcemia as well as sepsis. 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 Received Vancomycin and Cefepime in ED as part of sepsis bundle  Blood cultures -ve  Sputum culture ordered but never done. S/p Zosyn 3.375 mg IV Q8H X 7 days  Consulted hematology/oncology for further recs who recommended consulting pulmonology for bronchoscopy and biopsy. Pulm on board     Right upper lobe Moderately Differentiated Squamous Cell carcinoma  s/p biopsy (2023)  Patient has multiple medical devices that are concerning for possible  Malignancy.   IR consulted: Patient no longer confused from his hypercalcemia is agreeable to biopsy  Pulmonology     SEAN  Hypokalemia  Secondary to poor PO intake and underlying infection  IV hydration   Potassium replacement PO and IV  Creatinine on presentation 1.4 mg/DL, improved to 1.0 after fluid resuscitation  Monitor BMP       Leukocytosis 2/2 likely a combination of Pneumonia and Reactionary     Hyperkalemia   K 5.2  Nephro
pulmonary      SUBJECTIVE:  c/o pain,sob under control     OBJECTIVE    VITALS:  /79   Pulse 92   Temp 98 °F (36.7 °C) (Oral)   Resp 12   Ht 5' 9\" (1.753 m)   Wt 112 lb 14 oz (51.2 kg)   SpO2 100%   BMI 16.67 kg/m²   HEAD AND FACE EXAM:  No throat injection, no active exudate,no thrush  NECK EXAM;No JVD, no masses, symmetrical  CHEST EXAM; Expansion equal and symmetrical, no masses  LUNG EXAM; Good breath sounds bilaterally. There are expiratory wheezes both lungs, there are crackles at both lung bases  CARDIOVASCULAR EXAM: Positive S1 and S2, no S3 or S4, no clicks ,no murmurs  RIGHT AND LEFT LOWER EXTRIMITY EXAM: No edema, no swelling, no inflamation  CNS EXAM: Alert and oriented X3          LABS   Lab Results   Component Value Date    WBC 24.0 (H) 06/15/2023    HGB 9.6 (L) 06/15/2023    HCT 32.7 (L) 06/15/2023    MCV 93.4 06/15/2023     06/15/2023     Lab Results   Component Value Date    CREATININE 1.0 06/15/2023    BUN 14 06/15/2023     (L) 06/15/2023    K 5.2 (H) 06/15/2023    CL 99 06/15/2023    CO2 23 06/15/2023     Lab Results   Component Value Date    INR 1.31 06/08/2023    PROTIME 16.6 (H) 06/08/2023          Lab Results   Component Value Date/Time    PHOS 0.9 06/10/2023 05:00 AM      No results for input(s): PH, PO2ART, AUD0FJO, HCO3, BEART, O2SAT in the last 72 hours.       Wt Readings from Last 3 Encounters:   06/15/23 112 lb 14 oz (51.2 kg)   04/12/23 128 lb (58.1 kg)   03/29/23 126 lb (57.2 kg)               ASSESMENT  Sq cell ca rul  Pneumonia  copd        PLAN  Bd rx  Antibx  Pain control  Oncology eval in progress    6/15/2023  Hanna Schneider MD, MJOSE RAMON.
pulmonary      SUBJECTIVE:  no sob and seen early am     OBJECTIVE    VITALS:  /68   Pulse 99   Temp 97.5 °F (36.4 °C) (Oral)   Resp 15   Ht 5' 9\" (1.753 m)   Wt 113 lb 5.1 oz (51.4 kg)   SpO2 99%   BMI 16.73 kg/m²   HEAD AND FACE EXAM:  No throat injection, no active exudate,no thrush  NECK EXAM;No JVD, no masses, symmetrical  CHEST EXAM; Expansion equal and symmetrical, no masses  LUNG EXAM; Good breath sounds bilaterally. There are expiratory wheezes both lungs, there are crackles at both lung bases  CARDIOVASCULAR EXAM: Positive S1 and S2, no S3 or S4, no clicks ,no murmurs  RIGHT AND LEFT LOWER EXTRIMITY EXAM: No edema, no swelling,           LABS   Lab Results   Component Value Date    WBC 28.8 (H) 06/12/2023    HGB 8.8 (L) 06/12/2023    HCT 29.9 (L) 06/12/2023    MCV 91.7 06/12/2023     06/12/2023     Lab Results   Component Value Date    CREATININE 0.9 06/12/2023    BUN 12 06/12/2023     06/12/2023    K 4.5 06/12/2023     06/12/2023    CO2 23 06/12/2023     Lab Results   Component Value Date    INR 1.31 06/08/2023    PROTIME 16.6 (H) 06/08/2023          Lab Results   Component Value Date/Time    PHOS 0.9 06/10/2023 05:00 AM      No results for input(s): PH, PO2ART, NGE8OPA, HCO3, BEART, O2SAT in the last 72 hours.       Wt Readings from Last 3 Encounters:   06/13/23 113 lb 5.1 oz (51.4 kg)   04/12/23 128 lb (58.1 kg)   03/29/23 126 lb (57.2 kg)               ASSESMENT  Rul mass  Pneumonia  copd        PLAN  Cpm  Awaits bx results    6/13/2023  Rena Randolph MD, MJOSE RAMON.
pulmonary      SUBJECTIVE:  no sob and sleeping     OBJECTIVE    VITALS:  /82   Pulse 95   Temp 98.8 °F (37.1 °C) (Oral)   Resp 12   Ht 5' 9\" (1.753 m)   Wt 103 lb (46.7 kg)   SpO2 98%   BMI 15.21 kg/m²   HEAD AND FACE EXAM:  No throat injection, no active exudate,no thrush  NECK EXAM;No JVD, no masses, symmetrical  CHEST EXAM; Expansion equal and symmetrical, no masses  LUNG EXAM; Good breath sounds bilaterally. There are expiratory wheezes both lungs, there are crackles at both lung bases  CARDIOVASCULAR EXAM: Positive S1 and S2, no S3 or S4, no clicks ,no murmurs            LABS   Lab Results   Component Value Date    WBC 28.8 (H) 06/12/2023    HGB 8.8 (L) 06/12/2023    HCT 29.9 (L) 06/12/2023    MCV 91.7 06/12/2023     06/12/2023     Lab Results   Component Value Date    CREATININE 0.9 06/12/2023    BUN 12 06/12/2023     06/12/2023    K 4.5 06/12/2023     06/12/2023    CO2 23 06/12/2023     Lab Results   Component Value Date    INR 1.31 06/08/2023    PROTIME 16.6 (H) 06/08/2023          Lab Results   Component Value Date/Time    PHOS 0.9 06/10/2023 05:00 AM      No results for input(s): PH, PO2ART, HCS2WRP, HCO3, BEART, O2SAT in the last 72 hours.       Wt Readings from Last 3 Encounters:   06/12/23 103 lb (46.7 kg)   04/12/23 128 lb (58.1 kg)   03/29/23 126 lb (57.2 kg)               ASSESMENT  Rul mass  Pneumonia  copd        PLAN  Bd rx  Antibx  Ct bx today    6/12/2023  Marcela Barragan MD, M.D.
pulmonary      SUBJECTIVE:  stable and no sob. Pt was seen earlier today     OBJECTIVE    VITALS:  /77   Pulse 90   Temp 98.6 °F (37 °C) (Oral)   Resp 18   Ht 5' 9\" (1.753 m)   Wt 118 lb (53.5 kg)   SpO2 97%   BMI 17.43 kg/m²   HEAD AND FACE EXAM:  No throat injection, no active exudate,no thrush  NECK EXAM;No JVD, no masses, symmetrical  CHEST EXAM; Expansion equal and symmetrical, no masses  LUNG EXAM; Good breath sounds bilaterally. There are expiratory wheezes both lungs, there are crackles at both lung bases  CARDIOVASCULAR EXAM: Positive S1 and S2, no S3 or S4, no clicks ,no murmurs  RIGHT AND LEFT LOWER EXTRIMITY EXAM: No edema, no swelling,           LABS   Lab Results   Component Value Date    WBC 24.6 (H) 06/16/2023    HGB 9.4 (L) 06/16/2023    HCT 31.8 (L) 06/16/2023    MCV 90.9 06/16/2023     (H) 06/16/2023     Lab Results   Component Value Date    CREATININE 1.1 06/16/2023    BUN 12 06/16/2023     06/16/2023    K 4.7 06/16/2023     06/16/2023    CO2 22 06/16/2023     Lab Results   Component Value Date    INR 1.31 06/08/2023    PROTIME 16.6 (H) 06/08/2023          Lab Results   Component Value Date/Time    PHOS 0.9 06/10/2023 05:00 AM      No results for input(s): PH, PO2ART, EJC3EQB, HCO3, BEART, O2SAT in the last 72 hours.       Wt Readings from Last 3 Encounters:   06/16/23 118 lb (53.5 kg)   04/12/23 128 lb (58.1 kg)   03/29/23 126 lb (57.2 kg)               ASSESMENT  Copd  Pneumonia  Rul sq cell ca        PLAN  Bd rx  O2 as needed  Finished antibx  As per oncology    6/16/2023  Harper Villarreal MD, M.D.
pulmonary      SUBJECTIVE:  weak but doing ok and was seen early am     OBJECTIVE    VITALS:  /69   Pulse 91   Temp 98 °F (36.7 °C) (Oral)   Resp 24   Ht 5' 9\" (1.753 m)   Wt 113 lb 5.1 oz (51.4 kg)   SpO2 96%   BMI 16.73 kg/m²   HEAD AND FACE EXAM:  No throat injection, no active exudate,no thrush  NECK EXAM;No JVD, no masses, symmetrical  CHEST EXAM; Expansion equal and symmetrical, no masses  LUNG EXAM; Good breath sounds bilaterally. There are expiratory wheezes both lungs, there are crackles at both lung bases  CARDIOVASCULAR EXAM: Positive S1 and S2, no S3 or S4, no clicks ,no murmurs  RIGHT AND LEFT LOWER EXTRIMITY EXAM: No edema, no swelling,           LABS   Lab Results   Component Value Date    WBC 26.7 (H) 06/14/2023    HGB 8.1 (L) 06/14/2023    HCT 27.5 (L) 06/14/2023    MCV 92.0 06/14/2023     06/14/2023     Lab Results   Component Value Date    CREATININE 1.0 06/14/2023    BUN 12 06/14/2023     06/14/2023    K 4.6 06/14/2023     06/14/2023    CO2 20 (L) 06/14/2023     Lab Results   Component Value Date    INR 1.31 06/08/2023    PROTIME 16.6 (H) 06/08/2023          Lab Results   Component Value Date/Time    PHOS 0.9 06/10/2023 05:00 AM      No results for input(s): PH, PO2ART, HQV0VNG, HCO3, BEART, O2SAT in the last 72 hours.       Wt Readings from Last 3 Encounters:   06/13/23 113 lb 5.1 oz (51.4 kg)   04/12/23 128 lb (58.1 kg)   03/29/23 126 lb (57.2 kg)               ASSESMENT  Copd  Pneumonia  Rul mass        PLAN  Bd rx  Antibx  Awiats bx results    6/14/2023  Compa Davis MD, MJOSE RAMON.
Frame for Short Term Goals: 1 week  Short Term Goal 1: Pt will perform all transfers with SBA. Short Term Goal 2: Pt will ambulate 200ft w/ SBA and LRAD. Short Term Goal 3: Pt will perform light dynamic standing activites with single UE support for 3 minutes with SBA. Short Term Goal 4: Pt will perform 20 stairs with 1-2 handrails and CGA. Electronically signed by:     Clementina Jensen PTA  6/14/2023, 2:24 PM
unit  Bathroom Toilet: Standard  ADL Assistance: Independent  Homemaking Assistance: Independent  Ambulation Assistance: Independent  Transfer Assistance: Independent  Active : Yes  Short Term Goals  Time Frame for Short Term Goals: 1 week  Short Term Goal 1: Pt will perform all transfers with SBA. Short Term Goal 2: Pt will ambulate 200ft w/ SBA and LRAD. Short Term Goal 3: Pt will perform light dynamic standing activites with single UE support for 3 minutes with SBA. Short Term Goal 4: Pt will perform 20 stairs with 1-2 handrails and CGA. Electronically signed by:     Lowell Magaña PTA  6/15/2023, 1:41 PM
06/11/23  0121 06/12/23  0315   WBC 27.5* 23.1* 28.8*   HGB 7.9* 8.7* 8.8*    336 365       BMP:    Recent Labs     06/10/23  1248 06/11/23  0121 06/12/23  0315    138 136   K 4.0 3.7 4.5    106 104   CO2 24 24 23   BUN 13 15 12   CREATININE 0.9 0.9 0.9   GLUCOSE 96 112* 78       Hepatic:   No results for input(s): AST, ALT, ALB, BILITOT, ALKPHOS in the last 72 hours. INR:   No results for input(s): INR in the last 72 hours. ASSESSMENT/RECOMMENDATION  1. He has enlarging right lung mass. I recommend pulmonologist evaluation for ?bronchoscopy. He refused IR biopsy before. Agreeable to consider IR biopsy if pulmonology unable to obtain tissue sample with bronch  Confusion improved this morning. MRI brain negative  Planning for CT guided Bx 6/12, he is agreeable    2. Anemia. Anemic w/u.reviewed On antiplatelet agents. No monoclonal gammopathy. Iron indices consistent with inflammatory process although as recent as 3/2023 Hgb WNL. Remains stable over course of hospitalization   FOB not collected. May need GI evaluation. 3. Hypercalcemia- likely d/t malignancy, received 1 dose calcitonin only, remains WNL 6/12    On Remeron for malnutrition. PNA on antibiotic. I recommend to eat frequent meals. May need nutritional evaluation. I have independently evaluated and examined this patient today. I have reviewed radiologic and biochemical tests on this patient. Management Plan is developed mutually with ELMO Gerardo. I have reviewed above note and agree with assessment and plan    Shaniqua Darby PA-C    Imaging and labs reviewed and plan of care discussed with patient in depth. We will follow along. Please call with any questions.
carcinoma. Tumor markers pending. Caris and Guardant Liquid Bx requested. Discussed pathology with patient. Will discuss if definitive chemoradiation is an option for him as he is an unlikely surgical candidate given underlying lung disease. 2. Anemia. Anemic w/u.reviewed On antiplatelet agents. No monoclonal gammopathy. Iron indices consistent with inflammatory process although as recent as 3/2023 Hgb WNL. Remains stable over course of hospitalization   FOB not collected. May need GI evaluation non-urgently    3. Hypercalcemia- likely d/t malignancy, received 1 dose calcitonin only, continue to monitor trending up 6/14.     6/16/2023 We discussed about potential chemo and RT. Will get RT onc evaluation. He will think about chemotherapy. 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 Will add zometa. FU outpatient when D/C. On Remeron for malnutrition. PNA on antibiotic. I recommend to eat frequent meals. May need nutritional evaluation. Rene Valentin MD    Imaging and labs reviewed and plan of care discussed with patient in depth. We will follow along. Please call with any questions.
prior. No pneumothorax. XR CHEST PORTABLE    Result Date: 6/12/2023  EXAMINATION: ONE XRAY VIEW OF THE CHEST 6/7/2023 4:40 pm COMPARISON: 06/23/2022 chest x-ray. 04/27/2023 PET-CT. HISTORY: ORDERING SYSTEM PROVIDED HISTORY: dyspnea TECHNOLOGIST PROVIDED HISTORY: Reason for exam:->dyspnea Reason for Exam: dyspnea FINDINGS: There is a large right upper lobe mass measuring 7.1 x 8.6 cm in size corresponding to the mass seen on recent prior PET-CT. No significant change in appearance since prior PET-CT. No acute consolidation or pulmonary edema. Cardiomediastinal silhouette and bony thorax are unchanged. Large right upper lobe mass again demonstrated suspicious for primary malignancy. No other acute focal process. CTA PULMONARY W CONTRAST    Result Date: 6/12/2023  EXAMINATION: CTA OF THE CHEST 6/7/2023 5:51 pm TECHNIQUE: CTA of the chest was performed after the administration of intravenous contrast.  Multiplanar reformatted images are provided for review. MIP images are provided for review. Automated exposure control, iterative reconstruction, and/or weight based adjustment of the mA/kV was utilized to reduce the radiation dose to as low as reasonably achievable. COMPARISON: 03/27/2023, 04/27/2023 HISTORY: ORDERING SYSTEM PROVIDED HISTORY: dyspnea/hypoxia TECHNOLOGIST PROVIDED HISTORY: Reason for exam:->dyspnea/hypoxia Decision Support Exception - unselect if not a suspected or confirmed emergency medical condition->Emergency Medical Condition (MA) Reason for Exam: dyspnea/hypoxia FINDINGS: Pulmonary Arteries: Pulmonary arteries are adequately opacified for evaluation. No evidence of intraluminal filling defect to suggest pulmonary embolism. Main pulmonary artery is normal in caliber. Mediastinum: No evidence of mediastinal lymphadenopathy. The heart and pericardium demonstrate no acute abnormality. There is no acute abnormality of the thoracic aorta.  Lungs/pleura: Large right upper lobe centrally
X Size Of Lesion In Cm (Optional): 1.2

## 2023-06-17 ENCOUNTER — APPOINTMENT (OUTPATIENT)
Dept: GENERAL RADIOLOGY | Age: 58
DRG: 720 | End: 2023-06-17
Attending: PHYSICAL MEDICINE & REHABILITATION
Payer: COMMERCIAL

## 2023-06-17 PROBLEM — R53.1 GENERALIZED WEAKNESS: Status: ACTIVE | Noted: 2023-06-17

## 2023-06-17 PROBLEM — R26.9 GAIT DISTURBANCE: Status: ACTIVE | Noted: 2023-06-17

## 2023-06-17 PROBLEM — C34.90 SMALL CELL CARCINOMA OF LUNG (HCC): Status: ACTIVE | Noted: 2023-01-01

## 2023-06-17 PROBLEM — J18.9 COPD WITH PNEUMONIA (HCC): Status: ACTIVE | Noted: 2023-01-01

## 2023-06-17 PROBLEM — N17.0 ACUTE KIDNEY INJURY (AKI) WITH ACUTE TUBULAR NECROSIS (ATN) (HCC): Status: ACTIVE | Noted: 2023-06-17

## 2023-06-17 PROBLEM — J44.9 COPD WITH PNEUMONIA (HCC): Status: ACTIVE | Noted: 2023-06-17

## 2023-06-17 PROBLEM — I10 ESSENTIAL HYPERTENSION: Status: ACTIVE | Noted: 2023-01-01

## 2023-06-17 PROBLEM — Z86.73 HISTORY OF CVA (CEREBROVASCULAR ACCIDENT): Status: ACTIVE | Noted: 2023-01-01

## 2023-06-17 LAB
ANION GAP SERPL CALCULATED.3IONS-SCNC: 12 MMOL/L (ref 4–16)
BASOPHILS ABSOLUTE: 0.1 K/CU MM
BASOPHILS RELATIVE PERCENT: 0.3 % (ref 0–1)
BUN SERPL-MCNC: 13 MG/DL (ref 6–23)
CALCIUM SERPL-MCNC: 13.6 MG/DL (ref 8.3–10.6)
CHLORIDE BLD-SCNC: 108 MMOL/L (ref 99–110)
CO2: 21 MMOL/L (ref 21–32)
CREAT SERPL-MCNC: 1.3 MG/DL (ref 0.9–1.3)
DIFFERENTIAL TYPE: ABNORMAL
EOSINOPHILS ABSOLUTE: 0.1 K/CU MM
EOSINOPHILS RELATIVE PERCENT: 0.4 % (ref 0–3)
GFR SERPL CREATININE-BSD FRML MDRD: >60 ML/MIN/1.73M2
GLUCOSE SERPL-MCNC: 80 MG/DL (ref 70–99)
HCT VFR BLD CALC: 31.6 % (ref 42–52)
HEMOGLOBIN: 9.5 GM/DL (ref 13.5–18)
IMMATURE NEUTROPHIL %: 0.7 % (ref 0–0.43)
LYMPHOCYTES ABSOLUTE: 2.3 K/CU MM
LYMPHOCYTES RELATIVE PERCENT: 8.4 % (ref 24–44)
MCH RBC QN AUTO: 27.9 PG (ref 27–31)
MCHC RBC AUTO-ENTMCNC: 30.1 % (ref 32–36)
MCV RBC AUTO: 92.9 FL (ref 78–100)
MONOCYTES ABSOLUTE: 3.1 K/CU MM
MONOCYTES RELATIVE PERCENT: 11.3 % (ref 0–4)
NUCLEATED RBC %: 0 %
PDW BLD-RTO: 14.2 % (ref 11.7–14.9)
PLATELET # BLD: 474 K/CU MM (ref 140–440)
PMV BLD AUTO: 8.6 FL (ref 7.5–11.1)
POTASSIUM SERPL-SCNC: 5 MMOL/L (ref 3.5–5.1)
RBC # BLD: 3.4 M/CU MM (ref 4.6–6.2)
SEGMENTED NEUTROPHILS ABSOLUTE COUNT: 21.7 K/CU MM
SEGMENTED NEUTROPHILS RELATIVE PERCENT: 78.9 % (ref 36–66)
SODIUM BLD-SCNC: 141 MMOL/L (ref 135–145)
TOTAL IMMATURE NEUTOROPHIL: 0.19 K/CU MM
TOTAL NUCLEATED RBC: 0 K/CU MM
WBC # BLD: 27.5 K/CU MM (ref 4–10.5)

## 2023-06-17 PROCEDURE — 6360000002 HC RX W HCPCS: Performed by: INTERNAL MEDICINE

## 2023-06-17 PROCEDURE — 80048 BASIC METABOLIC PNL TOTAL CA: CPT

## 2023-06-17 PROCEDURE — 6370000000 HC RX 637 (ALT 250 FOR IP): Performed by: INTERNAL MEDICINE

## 2023-06-17 PROCEDURE — 2580000003 HC RX 258: Performed by: INTERNAL MEDICINE

## 2023-06-17 PROCEDURE — 85025 COMPLETE CBC W/AUTO DIFF WBC: CPT

## 2023-06-17 PROCEDURE — 71045 X-RAY EXAM CHEST 1 VIEW: CPT

## 2023-06-17 PROCEDURE — 94150 VITAL CAPACITY TEST: CPT

## 2023-06-17 PROCEDURE — 97116 GAIT TRAINING THERAPY: CPT

## 2023-06-17 PROCEDURE — 1280000000 HC REHAB R&B

## 2023-06-17 PROCEDURE — 94640 AIRWAY INHALATION TREATMENT: CPT

## 2023-06-17 PROCEDURE — 99233 SBSQ HOSP IP/OBS HIGH 50: CPT | Performed by: PHYSICAL MEDICINE & REHABILITATION

## 2023-06-17 PROCEDURE — 36415 COLL VENOUS BLD VENIPUNCTURE: CPT

## 2023-06-17 PROCEDURE — 94664 DEMO&/EVAL PT USE INHALER: CPT

## 2023-06-17 PROCEDURE — 94761 N-INVAS EAR/PLS OXIMETRY MLT: CPT

## 2023-06-17 PROCEDURE — 97535 SELF CARE MNGMENT TRAINING: CPT

## 2023-06-17 PROCEDURE — 97166 OT EVAL MOD COMPLEX 45 MIN: CPT

## 2023-06-17 PROCEDURE — 97542 WHEELCHAIR MNGMENT TRAINING: CPT

## 2023-06-17 PROCEDURE — 97530 THERAPEUTIC ACTIVITIES: CPT

## 2023-06-17 PROCEDURE — 97163 PT EVAL HIGH COMPLEX 45 MIN: CPT

## 2023-06-17 RX ADMIN — OXYCODONE AND ACETAMINOPHEN 1 TABLET: 5; 325 TABLET ORAL at 20:57

## 2023-06-17 RX ADMIN — BUDESONIDE AND FORMOTEROL FUMARATE DIHYDRATE 2 PUFF: 160; 4.5 AEROSOL RESPIRATORY (INHALATION) at 19:41

## 2023-06-17 RX ADMIN — OXYCODONE AND ACETAMINOPHEN 1 TABLET: 5; 325 TABLET ORAL at 14:22

## 2023-06-17 RX ADMIN — HYDROMORPHONE HYDROCHLORIDE 0.25 MG: 1 INJECTION, SOLUTION INTRAMUSCULAR; INTRAVENOUS; SUBCUTANEOUS at 09:32

## 2023-06-17 RX ADMIN — Medication 3 MG: at 20:57

## 2023-06-17 RX ADMIN — CEFTRIAXONE SODIUM 1000 MG: 1 INJECTION, POWDER, FOR SOLUTION INTRAMUSCULAR; INTRAVENOUS at 14:28

## 2023-06-17 RX ADMIN — MIRTAZAPINE 15 MG: 15 TABLET, ORALLY DISINTEGRATING ORAL at 20:58

## 2023-06-17 RX ADMIN — BUDESONIDE AND FORMOTEROL FUMARATE DIHYDRATE 2 PUFF: 160; 4.5 AEROSOL RESPIRATORY (INHALATION) at 08:01

## 2023-06-17 RX ADMIN — SODIUM CHLORIDE, PRESERVATIVE FREE 10 ML: 5 INJECTION INTRAVENOUS at 09:32

## 2023-06-17 RX ADMIN — CLOPIDOGREL BISULFATE 75 MG: 75 TABLET ORAL at 09:42

## 2023-06-17 RX ADMIN — SODIUM CHLORIDE, PRESERVATIVE FREE 10 ML: 5 INJECTION INTRAVENOUS at 20:58

## 2023-06-17 RX ADMIN — ATORVASTATIN CALCIUM 40 MG: 40 TABLET, FILM COATED ORAL at 17:14

## 2023-06-17 RX ADMIN — OXYCODONE AND ACETAMINOPHEN 1 TABLET: 5; 325 TABLET ORAL at 10:20

## 2023-06-17 ASSESSMENT — PAIN DESCRIPTION - PAIN TYPE
TYPE: CHRONIC PAIN
TYPE: CHRONIC PAIN

## 2023-06-17 ASSESSMENT — PAIN DESCRIPTION - ONSET
ONSET: ON-GOING

## 2023-06-17 ASSESSMENT — PAIN SCALES - GENERAL
PAINLEVEL_OUTOF10: 7
PAINLEVEL_OUTOF10: 10
PAINLEVEL_OUTOF10: 10
PAINLEVEL_OUTOF10: 5
PAINLEVEL_OUTOF10: 7
PAINLEVEL_OUTOF10: 10
PAINLEVEL_OUTOF10: 10

## 2023-06-17 ASSESSMENT — PAIN - FUNCTIONAL ASSESSMENT
PAIN_FUNCTIONAL_ASSESSMENT: ACTIVITIES ARE NOT PREVENTED
PAIN_FUNCTIONAL_ASSESSMENT: ACTIVITIES ARE NOT PREVENTED
PAIN_FUNCTIONAL_ASSESSMENT: PREVENTS OR INTERFERES WITH MANY ACTIVE NOT PASSIVE ACTIVITIES
PAIN_FUNCTIONAL_ASSESSMENT: PREVENTS OR INTERFERES SOME ACTIVE ACTIVITIES AND ADLS

## 2023-06-17 ASSESSMENT — PAIN DESCRIPTION - LOCATION
LOCATION: ABDOMEN
LOCATION: GENERALIZED
LOCATION: CHEST
LOCATION: ABDOMEN
LOCATION: ABDOMEN

## 2023-06-17 ASSESSMENT — PAIN DESCRIPTION - DESCRIPTORS
DESCRIPTORS: ACHING;THROBBING
DESCRIPTORS: ACHING
DESCRIPTORS: ACHING;DISCOMFORT;THROBBING
DESCRIPTORS: ACHING

## 2023-06-17 ASSESSMENT — PAIN DESCRIPTION - FREQUENCY
FREQUENCY: CONTINUOUS

## 2023-06-17 ASSESSMENT — PAIN DESCRIPTION - ORIENTATION
ORIENTATION: RIGHT;LEFT
ORIENTATION: RIGHT;LEFT
ORIENTATION: RIGHT
ORIENTATION: RIGHT;LEFT

## 2023-06-17 NOTE — PROGRESS NOTES
4 Eyes Skin Assessment     NAME:  Yaritza Barkley  YOB: 1965  MEDICAL RECORD NUMBER:  [id number]    The patient is being assessed for  Admission    I agree that at least one RN has performed a thorough Head to Toe Skin Assessment on the patient. ALL assessment sites listed below have been assessed. Areas assessed by both nurses:    Head, Face, Ears, Shoulders, Back, Chest, Arms, Elbows, Hands, Sacrum. Buttock, Coccyx, Ischium, and Legs. Feet and Heels        Does the Patient have a Wound?  No noted wound(s)       Cuco Prevention initiated by RN: No  Wound Care Orders initiated by RN: No    Pressure Injury (Stage 3,4, Unstageable, DTI, NWPT, and Complex wounds) if present, place Wound referral order by RN under : No    New Ostomies, if present place, Ostomy referral order under : No     Nurse 1 eSignature: Electronically signed by Alix Dela Cruz RN on 6/16/23 at 5:00 PM EDT    **SHARE this note so that the co-signing nurse can place an eSignature**    Nurse 2 eSignature: Electronically signed by Amaya Rivera RN on 6/16/23 at 8:56 PM EDT
ARU Admission Assessment - [facility]      A Complete drug regimen review was completed for this patient this date. []  No clinically significant medication issue was identified   []  Yes, a clinically significant medication issue was identified     []  Adverse Drug Event:    []  Allergy:    []  Side Effect:    []  Ineffective Therapy:    []  Drug interaction:     []  Duplicate Therapy:    []  Untreated Indication:    []  Non-adherence:    []  Other:  Nursing contacted the physician:       Date:                Time:    Actions recommended by physician were completed:   Date:                 Time:  Action(s) Taken:             []  New Physician Order Received    []  Issue Noted by Physician; However No Action Required    []  Other:        Ethnicity  \"Are you of , /a, or Norwegian origin? \"  Check all that apply:  [] A. No, not of , /a, or [place] Origin  [] B.  Yes, [place], [place] American, Chicano/a  [] C.  Yes, [address] [place] [address]  [] D.  Yes, [place]  [] E.  Yes, another , , or Norwegian origin  [] X. Patient unable to respond    Race  \"What is your race? \"  Check all that apply:  [] A. White  [] B. Black or   [] C. American [place] [facility] or [place] Native  [] D.  [place] [facility]  [] E. [place]  [] F. Macanese  [] G. [place]  [] Kemar Penny  [] I. [place]  [] J.  Other   [] K.   [] L. Kosovan or Akhil  [] M. Mauritanian  [] N. Other [facility]  [] X. Patient unable to respond    Language  A. \"What is your preferred language? \"   ***    B. \"Do you need or want an  to communicate with a doctor or health care staff? \"  Check only one:  [] 0. No  [] 1. Yes  [] 9. Unable to determine    Transportation  \"In the past 6-12 months, has lack of transportation kept you from medical appointments, meetings, work, or from getting things needed for daily living? \"  Check all that apply:  [] A.  Yes, it has kept me from medical
Cecille Aly    : 1965  Acct #: [de-identified]  MRN: [id number]              PM&R Progress Note      Admitting diagnosis: ***    Comorbid diagnoses impacting rehabilitation: ***    Chief complaint: ***    Prior (baseline) level of function: Independent. Current level of function:         Current  IRF-RAFAELA and Goals:   Occupational Therapy:      :     :                                       Eating:         Oral Hygiene:      UB/LB Bathing:      UB Dressing:           LB Dressing:      Donning and Mather Footwear: Toileting: Toilet Transfers:   Toilet Transfer  Assistance needed: Independent  CARE Score: 6    Physical Therapy:   Short Term Goals  Time Frame for Short Term Goals: 1 week STG=LTG  Short Term Goal 1: Pt will perform bed mobility with mod I  Short Term Goal 2: Pt will perform sit to stand and chair <>bed with mod I, car transfers with supervision  Short Term Goal 3: Pt will ambulate with LRAD 150' on level surface and 10' on unlevel surface with supervision  Short Term Goal 4: Pt will ascend/descend curb step with LRAD and 20 steps with one rail with supervision  Short Term Goal 5: Pt will  light object from floor with LRAD and reacher with mod I  Additional Goals?: Yes  Short Term Goal 6: Pt will propel w/c in household setting with mod I            Bed Mobility:   Sit to Lying  Assistance Needed: Supervision or touching assistance  Comment: supervision due to impulsivity  CARE Score: 4  Discharge Goal: Independent  Roll Left and Right  Assistance Needed: Supervision or touching assistance  Comment: Pt initially not able to fully roll to side due to R flank/upper lung pain but with encouragement was able to complete  CARE Score: 4  Discharge Goal: Independent  Lying to Sitting on Side of Bed  Assistance Needed: Supervision or touching assistance  Comment: supervision with pt going directly from supine to sit to  one motion with cues to sit,   CARE Score:
Pulmonary and Critical Care  Progress Note  Called to see the pt. Subjective: The patient is comfortable in bed. WBC elevated. Shortness of breath none  Chest pain none  Addressing respiratory complaints Patient is negative for  hemoptysis and cyanosis  CONSTITUTIONAL:  negative for fevers and chills      Past Medical History:     has a past medical history of Hypertension and TB (pulmonary tuberculosis). has a past surgical history that includes CT NEEDLE BIOPSY LUNG PERCUTANEOUS (6/12/2023). reports that he quit smoking about 3 months ago. His smoking use included cigarettes. He started smoking about 40 years ago. He has a 90.00 pack-year smoking history. He has quit using smokeless tobacco. He reports current alcohol use. He reports that he does not use drugs. Family history:  family history includes Cancer in his father and mother. No Known Allergies  Social History:    Reviewed; no changes    Objective:   PHYSICAL EXAM:        VITALS:  /76   Pulse (!) 113   Temp 98.6 °F (37 °C) (Oral)   Resp 18   Wt 100 lb 12 oz (45.7 kg)   SpO2 95%   BMI 14.88 kg/m²     24HR INTAKE/OUTPUT:    Intake/Output Summary (Last 24 hours) at 6/17/2023 1106  Last data filed at 6/17/2023 0944  Gross per 24 hour   Intake 130 ml   Output 1200 ml   Net -1070 ml       CONSTITUTIONAL:  awake  LUNGS:  decreased breath sounds  CARDIOVASCULAR:  normal S1 and S2 and negative JVD  ABD:Abdomen soft, non-tender. BS normal. No masses,  No organomegaly  NEURO:Alert.   DATA:    CBC:  Recent Labs     06/15/23  1109 06/16/23  0035 06/17/23  0307   WBC 24.0* 24.6* 27.5*   RBC 3.50* 3.50* 3.40*   HGB 9.6* 9.4* 9.5*   HCT 32.7* 31.8* 31.6*    471* 474*   MCV 93.4 90.9 92.9   MCH 27.4 26.9* 27.9   MCHC 29.4* 29.6* 30.1*   RDW 14.6 14.4 14.2   SEGSPCT 79.3* 80.2* 78.9*      BMP:  Recent Labs     06/15/23  1109 06/16/23  0035 06/17/23  0307   * 136 141   K 5.2* 4.7 5.0   CL 99 103 108   CO2 23 22 21   BUN 14 12 13
min assist for balance as pt used step in and step out method and had increased pain in chest  CARE Score: 3  Discharge Goal: Supervision or touching assistance    Ambulation:   Device used PTA: none   Walking Ability  Does the Patient Walk?: Yes     Walk 10 Feet  Assistance Needed: Partial/moderate assistance  Comment: min assist for walker control as pt started walking correctly with 2ww and CG for unsteadiness, then suddenly stopped and turned the walker backwards(while still in front of him) and tried to walk, causing increased balance disturbance. Able to reorient pt to correct positioning and he stated \"aww, I got that backwards didn't I?\"  CARE Score: 3  Discharge Goal: Supervision or touching assistance     Walk 50 Feet with Two Turns  Comment: Pt max distance was 10'. declined to walk further due to pain in R upper chest  Reason if not Attempted: Not attempted due to medical condition or safety concerns  CARE Score: 88  Discharge Goal: Supervision or touching assistance     Walk 150 Feet  Reason if not Attempted: Not attempted due to medical condition or safety concerns  CARE Score: 88  Discharge Goal: Supervision or touching assistance     Walking 10 Feet on Uneven Surfaces  Assistance Needed: Partial/moderate assistance  Comment: min assist with 2ww, impulsivity when encountering changes in height of surface, causing balance disturbances, mod cues  CARE Score: 3  Discharge Goal: Supervision or touching assistance     1 Step (Curb)  Assistance Needed: Supervision or touching assistance  Comment: CG with rails  CARE Score: 4  Discharge Goal: Supervision or touching assistance     4 Steps  Assistance Needed: Partial/moderate assistance  Comment: B rails, min assist for control at times, but at other times CGA.  pt uses reciprocal pattern ascending and step to descending  CARE Score: 3  Discharge Goal: Supervision or touching assistance     12 Steps  Comment: 4 steps was max tolerance due to pain  Reason if
Rehabtracker with patient and/or family this date.   REQUIRES OT FOLLOW-UP: Yes  Discharge Recommendations:  home with 24 hour assist   and home health OT   Equipment Recommendations:  continue to assess    Goals:  Patient Goals   Patient goals : Pt would like to return to prior level of funcitoning  Short Term Goals  Time Frame for Short Term Goals: LTGs= STGs  Long Term Goals  Time Frame for Long Term Goals : 7-10 days  Long Term Goal 1: Pt will complete oral hygiene and self-feeding IND  Long Term Goal 2: Pt will complete UB dressing and bathing IND  Long Term Goal 3: Pt will complete LB dressing and bathing IND  Long Term Goal 4: Pt will complete donning + doffing footwear IND  Long Term Goal 5: Pt will complete all aspects of toileting IND  Additional Goals?: Yes  Long Term Goal 6: Pt will complete all functional transfers IND  Long Term Goal 7: Pt will complete a 12 minute standing functional task to improve strength and endurance for ADLs/IADLs with IND    Plan:    Pt will be seen at least 60 minutes per day for a minimum of 5 days per week, plus group therapy as appropriate       OT Individual Minutes  Time In: 1300  Time Out: 1430  Minutes: 90                Number of Minutes/Billable Intervention      OT Evaluation 30   Therapeutic Exercise    ADL Self-care 60   Neuro Re-Ed    Therapeutic Activity    Group    Other:    TOTAL 90     Electronically signed by:    Wendy Berumen OT, O.T.    6/17/2023, 5:32 PM

## 2023-06-18 LAB
ALBUMIN SERPL-MCNC: 2.8 GM/DL (ref 3.4–5)
ALP BLD-CCNC: 98 IU/L (ref 40–128)
ALT SERPL-CCNC: 49 U/L (ref 10–40)
ANION GAP SERPL CALCULATED.3IONS-SCNC: 12 MMOL/L (ref 4–16)
AST SERPL-CCNC: 27 IU/L (ref 15–37)
BASOPHILS ABSOLUTE: 0.1 K/CU MM
BASOPHILS RELATIVE PERCENT: 0.2 % (ref 0–1)
BILIRUB SERPL-MCNC: 0.3 MG/DL (ref 0–1)
BUN SERPL-MCNC: 17 MG/DL (ref 6–23)
CALCIUM SERPL-MCNC: 11.1 MG/DL (ref 8.3–10.6)
CHLORIDE BLD-SCNC: 100 MMOL/L (ref 99–110)
CO2: 19 MMOL/L (ref 21–32)
CREAT SERPL-MCNC: 1.4 MG/DL (ref 0.9–1.3)
DIFFERENTIAL TYPE: ABNORMAL
EOSINOPHILS ABSOLUTE: 0.2 K/CU MM
EOSINOPHILS RELATIVE PERCENT: 0.7 % (ref 0–3)
GFR SERPL CREATININE-BSD FRML MDRD: 59 ML/MIN/1.73M2
GLUCOSE SERPL-MCNC: 75 MG/DL (ref 70–99)
HCT VFR BLD CALC: 26.8 % (ref 42–52)
HEMOGLOBIN: 7.9 GM/DL (ref 13.5–18)
IMMATURE NEUTROPHIL %: 0.5 % (ref 0–0.43)
LYMPHOCYTES ABSOLUTE: 1.9 K/CU MM
LYMPHOCYTES RELATIVE PERCENT: 8 % (ref 24–44)
MAGNESIUM: 1.9 MG/DL (ref 1.8–2.4)
MCH RBC QN AUTO: 26.9 PG (ref 27–31)
MCHC RBC AUTO-ENTMCNC: 29.5 % (ref 32–36)
MCV RBC AUTO: 91.2 FL (ref 78–100)
MONOCYTES ABSOLUTE: 2.2 K/CU MM
MONOCYTES RELATIVE PERCENT: 9.2 % (ref 0–4)
NUCLEATED RBC %: 0 %
PDW BLD-RTO: 13.9 % (ref 11.7–14.9)
PHOSPHORUS: 2 MG/DL (ref 2.5–4.9)
PLATELET # BLD: 454 K/CU MM (ref 140–440)
PMV BLD AUTO: 8.8 FL (ref 7.5–11.1)
POTASSIUM SERPL-SCNC: 3.9 MMOL/L (ref 3.5–5.1)
PRO-BNP: 529.2 PG/ML
PROCALCITONIN SERPL-MCNC: 0.4 NG/ML
RBC # BLD: 2.94 M/CU MM (ref 4.6–6.2)
SEGMENTED NEUTROPHILS ABSOLUTE COUNT: 19.7 K/CU MM
SEGMENTED NEUTROPHILS RELATIVE PERCENT: 81.4 % (ref 36–66)
SODIUM BLD-SCNC: 131 MMOL/L (ref 135–145)
TOTAL IMMATURE NEUTOROPHIL: 0.13 K/CU MM
TOTAL NUCLEATED RBC: 0 K/CU MM
TOTAL PROTEIN: 6.3 GM/DL (ref 6.4–8.2)
WBC # BLD: 24.3 K/CU MM (ref 4–10.5)

## 2023-06-18 PROCEDURE — 84145 PROCALCITONIN (PCT): CPT

## 2023-06-18 PROCEDURE — 80053 COMPREHEN METABOLIC PANEL: CPT

## 2023-06-18 PROCEDURE — 85025 COMPLETE CBC W/AUTO DIFF WBC: CPT

## 2023-06-18 PROCEDURE — 1280000000 HC REHAB R&B

## 2023-06-18 PROCEDURE — 83735 ASSAY OF MAGNESIUM: CPT

## 2023-06-18 PROCEDURE — 94150 VITAL CAPACITY TEST: CPT

## 2023-06-18 PROCEDURE — 6370000000 HC RX 637 (ALT 250 FOR IP): Performed by: INTERNAL MEDICINE

## 2023-06-18 PROCEDURE — 2580000003 HC RX 258: Performed by: INTERNAL MEDICINE

## 2023-06-18 PROCEDURE — 83880 ASSAY OF NATRIURETIC PEPTIDE: CPT

## 2023-06-18 PROCEDURE — 94640 AIRWAY INHALATION TREATMENT: CPT

## 2023-06-18 PROCEDURE — 84100 ASSAY OF PHOSPHORUS: CPT

## 2023-06-18 PROCEDURE — 94761 N-INVAS EAR/PLS OXIMETRY MLT: CPT

## 2023-06-18 PROCEDURE — 36415 COLL VENOUS BLD VENIPUNCTURE: CPT

## 2023-06-18 PROCEDURE — 6360000002 HC RX W HCPCS: Performed by: INTERNAL MEDICINE

## 2023-06-18 RX ADMIN — CLOPIDOGREL BISULFATE 75 MG: 75 TABLET ORAL at 08:33

## 2023-06-18 RX ADMIN — BUDESONIDE AND FORMOTEROL FUMARATE DIHYDRATE 2 PUFF: 160; 4.5 AEROSOL RESPIRATORY (INHALATION) at 20:20

## 2023-06-18 RX ADMIN — POTASSIUM & SODIUM PHOSPHATES POWDER PACK 280-160-250 MG 250 MG: 280-160-250 PACK at 14:35

## 2023-06-18 RX ADMIN — BUDESONIDE AND FORMOTEROL FUMARATE DIHYDRATE 2 PUFF: 160; 4.5 AEROSOL RESPIRATORY (INHALATION) at 07:23

## 2023-06-18 RX ADMIN — OXYCODONE AND ACETAMINOPHEN 1 TABLET: 5; 325 TABLET ORAL at 20:30

## 2023-06-18 RX ADMIN — SODIUM CHLORIDE, PRESERVATIVE FREE 10 ML: 5 INJECTION INTRAVENOUS at 20:31

## 2023-06-18 RX ADMIN — OXYCODONE AND ACETAMINOPHEN 1 TABLET: 5; 325 TABLET ORAL at 14:34

## 2023-06-18 RX ADMIN — SODIUM CHLORIDE, PRESERVATIVE FREE 10 ML: 5 INJECTION INTRAVENOUS at 08:33

## 2023-06-18 RX ADMIN — CEFTRIAXONE SODIUM 1000 MG: 1 INJECTION, POWDER, FOR SOLUTION INTRAMUSCULAR; INTRAVENOUS at 14:36

## 2023-06-18 RX ADMIN — MIRTAZAPINE 15 MG: 15 TABLET, ORALLY DISINTEGRATING ORAL at 20:30

## 2023-06-18 RX ADMIN — OXYCODONE AND ACETAMINOPHEN 1 TABLET: 5; 325 TABLET ORAL at 05:09

## 2023-06-18 RX ADMIN — ATORVASTATIN CALCIUM 40 MG: 40 TABLET, FILM COATED ORAL at 16:31

## 2023-06-18 ASSESSMENT — PAIN DESCRIPTION - ORIENTATION
ORIENTATION: RIGHT;LEFT
ORIENTATION: MID
ORIENTATION: RIGHT

## 2023-06-18 ASSESSMENT — PAIN SCALES - GENERAL
PAINLEVEL_OUTOF10: 8
PAINLEVEL_OUTOF10: 0
PAINLEVEL_OUTOF10: 7
PAINLEVEL_OUTOF10: 0
PAINLEVEL_OUTOF10: 0
PAINLEVEL_OUTOF10: 5
PAINLEVEL_OUTOF10: 0
PAINLEVEL_OUTOF10: 7
PAINLEVEL_OUTOF10: 0

## 2023-06-18 ASSESSMENT — PAIN DESCRIPTION - FREQUENCY
FREQUENCY: CONTINUOUS
FREQUENCY: CONTINUOUS

## 2023-06-18 ASSESSMENT — PAIN DESCRIPTION - DESCRIPTORS
DESCRIPTORS: ACHING

## 2023-06-18 ASSESSMENT — PAIN DESCRIPTION - LOCATION
LOCATION: CHEST
LOCATION: GENERALIZED
LOCATION: BACK

## 2023-06-18 ASSESSMENT — PAIN DESCRIPTION - PAIN TYPE
TYPE: CHRONIC PAIN
TYPE: CHRONIC PAIN

## 2023-06-18 ASSESSMENT — PAIN DESCRIPTION - ONSET
ONSET: ON-GOING
ONSET: GRADUAL

## 2023-06-19 PROCEDURE — 6360000002 HC RX W HCPCS: Performed by: INTERNAL MEDICINE

## 2023-06-19 PROCEDURE — 97150 GROUP THERAPEUTIC PROCEDURES: CPT

## 2023-06-19 PROCEDURE — 99233 SBSQ HOSP IP/OBS HIGH 50: CPT | Performed by: PHYSICAL MEDICINE & REHABILITATION

## 2023-06-19 PROCEDURE — 2580000003 HC RX 258: Performed by: INTERNAL MEDICINE

## 2023-06-19 PROCEDURE — 97110 THERAPEUTIC EXERCISES: CPT

## 2023-06-19 PROCEDURE — 6370000000 HC RX 637 (ALT 250 FOR IP): Performed by: INTERNAL MEDICINE

## 2023-06-19 PROCEDURE — 97535 SELF CARE MNGMENT TRAINING: CPT

## 2023-06-19 PROCEDURE — 97116 GAIT TRAINING THERAPY: CPT

## 2023-06-19 PROCEDURE — 92523 SPEECH SOUND LANG COMPREHEN: CPT

## 2023-06-19 PROCEDURE — 94761 N-INVAS EAR/PLS OXIMETRY MLT: CPT

## 2023-06-19 PROCEDURE — 6370000000 HC RX 637 (ALT 250 FOR IP): Performed by: PHYSICAL MEDICINE & REHABILITATION

## 2023-06-19 PROCEDURE — 97530 THERAPEUTIC ACTIVITIES: CPT

## 2023-06-19 PROCEDURE — 1280000000 HC REHAB R&B

## 2023-06-19 PROCEDURE — 94150 VITAL CAPACITY TEST: CPT

## 2023-06-19 PROCEDURE — 94640 AIRWAY INHALATION TREATMENT: CPT

## 2023-06-19 RX ADMIN — HYDROMORPHONE HYDROCHLORIDE 0.25 MG: 1 INJECTION, SOLUTION INTRAMUSCULAR; INTRAVENOUS; SUBCUTANEOUS at 15:43

## 2023-06-19 RX ADMIN — BUDESONIDE AND FORMOTEROL FUMARATE DIHYDRATE 2 PUFF: 160; 4.5 AEROSOL RESPIRATORY (INHALATION) at 20:38

## 2023-06-19 RX ADMIN — SODIUM CHLORIDE, PRESERVATIVE FREE 10 ML: 5 INJECTION INTRAVENOUS at 09:12

## 2023-06-19 RX ADMIN — POTASSIUM & SODIUM PHOSPHATES POWDER PACK 280-160-250 MG 250 MG: 280-160-250 PACK at 17:39

## 2023-06-19 RX ADMIN — HYDROMORPHONE HYDROCHLORIDE 0.25 MG: 1 INJECTION, SOLUTION INTRAMUSCULAR; INTRAVENOUS; SUBCUTANEOUS at 09:46

## 2023-06-19 RX ADMIN — BUDESONIDE AND FORMOTEROL FUMARATE DIHYDRATE 2 PUFF: 160; 4.5 AEROSOL RESPIRATORY (INHALATION) at 08:54

## 2023-06-19 RX ADMIN — POTASSIUM & SODIUM PHOSPHATES POWDER PACK 280-160-250 MG 250 MG: 280-160-250 PACK at 09:16

## 2023-06-19 RX ADMIN — OXYCODONE AND ACETAMINOPHEN 1 TABLET: 5; 325 TABLET ORAL at 13:06

## 2023-06-19 RX ADMIN — OXYCODONE AND ACETAMINOPHEN 1 TABLET: 5; 325 TABLET ORAL at 06:43

## 2023-06-19 RX ADMIN — ATORVASTATIN CALCIUM 40 MG: 40 TABLET, FILM COATED ORAL at 17:39

## 2023-06-19 RX ADMIN — CEFTRIAXONE SODIUM 1000 MG: 1 INJECTION, POWDER, FOR SOLUTION INTRAMUSCULAR; INTRAVENOUS at 13:06

## 2023-06-19 RX ADMIN — MIRTAZAPINE 15 MG: 15 TABLET, ORALLY DISINTEGRATING ORAL at 20:55

## 2023-06-19 RX ADMIN — ACETAMINOPHEN 650 MG: 325 TABLET ORAL at 09:07

## 2023-06-19 RX ADMIN — CLOPIDOGREL BISULFATE 75 MG: 75 TABLET ORAL at 09:07

## 2023-06-19 RX ADMIN — ACETAMINOPHEN 650 MG: 325 TABLET ORAL at 20:55

## 2023-06-19 ASSESSMENT — PAIN DESCRIPTION - LOCATION
LOCATION: RIB CAGE
LOCATION: CHEST
LOCATION: RIB CAGE
LOCATION: FLANK
LOCATION: RIB CAGE
LOCATION: RIB CAGE

## 2023-06-19 ASSESSMENT — PAIN DESCRIPTION - DESCRIPTORS
DESCRIPTORS: DULL
DESCRIPTORS: DULL;SHARP
DESCRIPTORS: ACHING

## 2023-06-19 ASSESSMENT — PAIN SCALES - GENERAL
PAINLEVEL_OUTOF10: 3
PAINLEVEL_OUTOF10: 8
PAINLEVEL_OUTOF10: 10
PAINLEVEL_OUTOF10: 9
PAINLEVEL_OUTOF10: 10
PAINLEVEL_OUTOF10: 10

## 2023-06-19 ASSESSMENT — PAIN DESCRIPTION - ORIENTATION
ORIENTATION: RIGHT

## 2023-06-19 ASSESSMENT — PAIN DESCRIPTION - PAIN TYPE: TYPE: CHRONIC PAIN

## 2023-06-19 ASSESSMENT — PAIN DESCRIPTION - FREQUENCY: FREQUENCY: CONTINUOUS

## 2023-06-19 ASSESSMENT — PAIN DESCRIPTION - ONSET: ONSET: GRADUAL

## 2023-06-20 ENCOUNTER — TELEPHONE (OUTPATIENT)
Dept: RADIATION ONCOLOGY | Age: 58
End: 2023-06-20

## 2023-06-20 PROCEDURE — 97110 THERAPEUTIC EXERCISES: CPT

## 2023-06-20 PROCEDURE — 6360000002 HC RX W HCPCS: Performed by: INTERNAL MEDICINE

## 2023-06-20 PROCEDURE — 97535 SELF CARE MNGMENT TRAINING: CPT

## 2023-06-20 PROCEDURE — 94761 N-INVAS EAR/PLS OXIMETRY MLT: CPT

## 2023-06-20 PROCEDURE — 94640 AIRWAY INHALATION TREATMENT: CPT

## 2023-06-20 PROCEDURE — 97530 THERAPEUTIC ACTIVITIES: CPT

## 2023-06-20 PROCEDURE — 97116 GAIT TRAINING THERAPY: CPT

## 2023-06-20 PROCEDURE — 1280000000 HC REHAB R&B

## 2023-06-20 PROCEDURE — 6370000000 HC RX 637 (ALT 250 FOR IP): Performed by: INTERNAL MEDICINE

## 2023-06-20 PROCEDURE — 2580000003 HC RX 258: Performed by: INTERNAL MEDICINE

## 2023-06-20 PROCEDURE — 99233 SBSQ HOSP IP/OBS HIGH 50: CPT | Performed by: PHYSICAL MEDICINE & REHABILITATION

## 2023-06-20 PROCEDURE — 94150 VITAL CAPACITY TEST: CPT

## 2023-06-20 PROCEDURE — 99232 SBSQ HOSP IP/OBS MODERATE 35: CPT | Performed by: INTERNAL MEDICINE

## 2023-06-20 RX ADMIN — ATORVASTATIN CALCIUM 40 MG: 40 TABLET, FILM COATED ORAL at 17:40

## 2023-06-20 RX ADMIN — SODIUM CHLORIDE, PRESERVATIVE FREE 10 ML: 5 INJECTION INTRAVENOUS at 11:06

## 2023-06-20 RX ADMIN — MIRTAZAPINE 15 MG: 15 TABLET, ORALLY DISINTEGRATING ORAL at 20:35

## 2023-06-20 RX ADMIN — HYDROMORPHONE HYDROCHLORIDE 0.25 MG: 1 INJECTION, SOLUTION INTRAMUSCULAR; INTRAVENOUS; SUBCUTANEOUS at 03:59

## 2023-06-20 RX ADMIN — HYDROMORPHONE HYDROCHLORIDE 0.25 MG: 1 INJECTION, SOLUTION INTRAMUSCULAR; INTRAVENOUS; SUBCUTANEOUS at 09:44

## 2023-06-20 RX ADMIN — POTASSIUM & SODIUM PHOSPHATES POWDER PACK 280-160-250 MG 250 MG: 280-160-250 PACK at 17:40

## 2023-06-20 RX ADMIN — BUDESONIDE AND FORMOTEROL FUMARATE DIHYDRATE 2 PUFF: 160; 4.5 AEROSOL RESPIRATORY (INHALATION) at 07:22

## 2023-06-20 RX ADMIN — OXYCODONE AND ACETAMINOPHEN 1 TABLET: 5; 325 TABLET ORAL at 15:38

## 2023-06-20 RX ADMIN — OXYCODONE AND ACETAMINOPHEN 1 TABLET: 5; 325 TABLET ORAL at 05:44

## 2023-06-20 RX ADMIN — HYDROMORPHONE HYDROCHLORIDE 0.25 MG: 1 INJECTION, SOLUTION INTRAMUSCULAR; INTRAVENOUS; SUBCUTANEOUS at 20:43

## 2023-06-20 RX ADMIN — SODIUM CHLORIDE, PRESERVATIVE FREE 10 ML: 5 INJECTION INTRAVENOUS at 20:37

## 2023-06-20 RX ADMIN — OXYCODONE AND ACETAMINOPHEN 1 TABLET: 5; 325 TABLET ORAL at 10:54

## 2023-06-20 RX ADMIN — HYDROMORPHONE HYDROCHLORIDE 0.25 MG: 1 INJECTION, SOLUTION INTRAMUSCULAR; INTRAVENOUS; SUBCUTANEOUS at 14:19

## 2023-06-20 RX ADMIN — BUDESONIDE AND FORMOTEROL FUMARATE DIHYDRATE 2 PUFF: 160; 4.5 AEROSOL RESPIRATORY (INHALATION) at 18:50

## 2023-06-20 RX ADMIN — CLOPIDOGREL BISULFATE 75 MG: 75 TABLET ORAL at 10:54

## 2023-06-20 RX ADMIN — CEFTRIAXONE SODIUM 1000 MG: 1 INJECTION, POWDER, FOR SOLUTION INTRAMUSCULAR; INTRAVENOUS at 11:05

## 2023-06-20 RX ADMIN — SODIUM CHLORIDE: 9 INJECTION, SOLUTION INTRAVENOUS at 11:04

## 2023-06-20 RX ADMIN — POTASSIUM & SODIUM PHOSPHATES POWDER PACK 280-160-250 MG 250 MG: 280-160-250 PACK at 10:54

## 2023-06-20 ASSESSMENT — PAIN SCALES - GENERAL
PAINLEVEL_OUTOF10: 8
PAINLEVEL_OUTOF10: 9
PAINLEVEL_OUTOF10: 10
PAINLEVEL_OUTOF10: 9
PAINLEVEL_OUTOF10: 8
PAINLEVEL_OUTOF10: 10

## 2023-06-20 ASSESSMENT — PAIN DESCRIPTION - PAIN TYPE
TYPE: CHRONIC PAIN

## 2023-06-20 ASSESSMENT — PAIN DESCRIPTION - DESCRIPTORS
DESCRIPTORS: ACHING;SHARP
DESCRIPTORS: ACHING;JABBING
DESCRIPTORS: ACHING
DESCRIPTORS: ACHING;HEAVINESS;THROBBING
DESCRIPTORS: ACHING
DESCRIPTORS: ACHING;SORE;SHOOTING

## 2023-06-20 ASSESSMENT — PAIN DESCRIPTION - FREQUENCY
FREQUENCY: INTERMITTENT
FREQUENCY: CONTINUOUS
FREQUENCY: CONTINUOUS
FREQUENCY: INTERMITTENT

## 2023-06-20 ASSESSMENT — PAIN DESCRIPTION - ORIENTATION
ORIENTATION: RIGHT

## 2023-06-20 ASSESSMENT — PAIN DESCRIPTION - LOCATION
LOCATION: RIB CAGE
LOCATION: BACK;CHEST
LOCATION: RIB CAGE
LOCATION: RIB CAGE

## 2023-06-20 ASSESSMENT — PAIN DESCRIPTION - ONSET
ONSET: ON-GOING
ONSET: GRADUAL
ONSET: GRADUAL
ONSET: ON-GOING

## 2023-06-20 ASSESSMENT — PAIN - FUNCTIONAL ASSESSMENT
PAIN_FUNCTIONAL_ASSESSMENT: ACTIVITIES ARE NOT PREVENTED
PAIN_FUNCTIONAL_ASSESSMENT: PREVENTS OR INTERFERES SOME ACTIVE ACTIVITIES AND ADLS
PAIN_FUNCTIONAL_ASSESSMENT: ACTIVITIES ARE NOT PREVENTED
PAIN_FUNCTIONAL_ASSESSMENT: PREVENTS OR INTERFERES SOME ACTIVE ACTIVITIES AND ADLS
PAIN_FUNCTIONAL_ASSESSMENT: ACTIVITIES ARE NOT PREVENTED

## 2023-06-20 NOTE — TELEPHONE ENCOUNTER
Call placed to [facility], spoke to pt's nurse Augie regarding pt's appt for CT/SIM for radiation planning. Pt scheduled for tomorrow 6/21 at 11:00 AM. [facility] staff to arrange for transport to [facility] at 10:45 AM, appt to be approximately 60 minutes long. Direct contact info given, advised to call with any questions or concerns.

## 2023-06-21 PROBLEM — F31.9 BIPOLAR DEPRESSION (HCC): Status: ACTIVE | Noted: 2023-01-01

## 2023-06-21 PROBLEM — F43.23 ADJUSTMENT DISORDER WITH MIXED ANXIETY AND DEPRESSED MOOD: Status: ACTIVE | Noted: 2023-06-21

## 2023-06-21 PROCEDURE — 6370000000 HC RX 637 (ALT 250 FOR IP): Performed by: PHYSICAL MEDICINE & REHABILITATION

## 2023-06-21 PROCEDURE — 94761 N-INVAS EAR/PLS OXIMETRY MLT: CPT

## 2023-06-21 PROCEDURE — 1280000000 HC REHAB R&B

## 2023-06-21 PROCEDURE — 99253 IP/OBS CNSLTJ NEW/EST LOW 45: CPT | Performed by: NURSE PRACTITIONER

## 2023-06-21 PROCEDURE — 6360000002 HC RX W HCPCS: Performed by: INTERNAL MEDICINE

## 2023-06-21 PROCEDURE — 97116 GAIT TRAINING THERAPY: CPT

## 2023-06-21 PROCEDURE — 99233 SBSQ HOSP IP/OBS HIGH 50: CPT | Performed by: PHYSICAL MEDICINE & REHABILITATION

## 2023-06-21 PROCEDURE — 6370000000 HC RX 637 (ALT 250 FOR IP): Performed by: INTERNAL MEDICINE

## 2023-06-21 PROCEDURE — 97110 THERAPEUTIC EXERCISES: CPT

## 2023-06-21 PROCEDURE — 2580000003 HC RX 258: Performed by: INTERNAL MEDICINE

## 2023-06-21 PROCEDURE — 97535 SELF CARE MNGMENT TRAINING: CPT

## 2023-06-21 PROCEDURE — 6370000000 HC RX 637 (ALT 250 FOR IP): Performed by: NURSE PRACTITIONER

## 2023-06-21 PROCEDURE — 97530 THERAPEUTIC ACTIVITIES: CPT

## 2023-06-21 PROCEDURE — 94640 AIRWAY INHALATION TREATMENT: CPT

## 2023-06-21 RX ORDER — ARIPIPRAZOLE 5 MG/1
5 TABLET ORAL DAILY
Status: DISCONTINUED | OUTPATIENT
Start: 2023-06-25 | End: 2023-06-21

## 2023-06-21 RX ORDER — ARIPIPRAZOLE 5 MG/1
5 TABLET ORAL NIGHTLY
Status: DISCONTINUED | OUTPATIENT
Start: 2023-06-25 | End: 2023-06-23 | Stop reason: HOSPADM

## 2023-06-21 RX ORDER — ARIPIPRAZOLE 5 MG/1
2.5 TABLET ORAL NIGHTLY
Status: DISCONTINUED | OUTPATIENT
Start: 2023-06-21 | End: 2023-06-23 | Stop reason: HOSPADM

## 2023-06-21 RX ORDER — ARIPIPRAZOLE 5 MG/1
2.5 TABLET ORAL NIGHTLY
Status: DISCONTINUED | OUTPATIENT
Start: 2023-06-21 | End: 2023-06-21

## 2023-06-21 RX ADMIN — POTASSIUM & SODIUM PHOSPHATES POWDER PACK 280-160-250 MG 250 MG: 280-160-250 PACK at 08:36

## 2023-06-21 RX ADMIN — ARIPIPRAZOLE 2.5 MG: 5 TABLET ORAL at 21:22

## 2023-06-21 RX ADMIN — HYDROMORPHONE HYDROCHLORIDE 0.25 MG: 1 INJECTION, SOLUTION INTRAMUSCULAR; INTRAVENOUS; SUBCUTANEOUS at 13:25

## 2023-06-21 RX ADMIN — OXYCODONE AND ACETAMINOPHEN 1 TABLET: 5; 325 TABLET ORAL at 15:22

## 2023-06-21 RX ADMIN — OXYCODONE AND ACETAMINOPHEN 1 TABLET: 5; 325 TABLET ORAL at 21:22

## 2023-06-21 RX ADMIN — ACETAMINOPHEN 650 MG: 325 TABLET ORAL at 17:27

## 2023-06-21 RX ADMIN — OXYCODONE AND ACETAMINOPHEN 1 TABLET: 5; 325 TABLET ORAL at 06:59

## 2023-06-21 RX ADMIN — BUDESONIDE AND FORMOTEROL FUMARATE DIHYDRATE 2 PUFF: 160; 4.5 AEROSOL RESPIRATORY (INHALATION) at 20:07

## 2023-06-21 RX ADMIN — MIRTAZAPINE 15 MG: 15 TABLET, ORALLY DISINTEGRATING ORAL at 21:22

## 2023-06-21 RX ADMIN — POTASSIUM & SODIUM PHOSPHATES POWDER PACK 280-160-250 MG 250 MG: 280-160-250 PACK at 15:59

## 2023-06-21 RX ADMIN — ATORVASTATIN CALCIUM 40 MG: 40 TABLET, FILM COATED ORAL at 21:22

## 2023-06-21 RX ADMIN — CEFTRIAXONE SODIUM 1000 MG: 1 INJECTION, POWDER, FOR SOLUTION INTRAMUSCULAR; INTRAVENOUS at 10:44

## 2023-06-21 RX ADMIN — CLOPIDOGREL BISULFATE 75 MG: 75 TABLET ORAL at 08:36

## 2023-06-21 RX ADMIN — HYDROMORPHONE HYDROCHLORIDE 0.25 MG: 1 INJECTION, SOLUTION INTRAMUSCULAR; INTRAVENOUS; SUBCUTANEOUS at 05:27

## 2023-06-21 RX ADMIN — OXYCODONE AND ACETAMINOPHEN 1 TABLET: 5; 325 TABLET ORAL at 10:44

## 2023-06-21 RX ADMIN — SODIUM CHLORIDE, PRESERVATIVE FREE 10 ML: 5 INJECTION INTRAVENOUS at 08:31

## 2023-06-21 RX ADMIN — OXYCODONE AND ACETAMINOPHEN 1 TABLET: 5; 325 TABLET ORAL at 02:45

## 2023-06-21 RX ADMIN — SODIUM CHLORIDE, PRESERVATIVE FREE 10 ML: 5 INJECTION INTRAVENOUS at 21:22

## 2023-06-21 ASSESSMENT — PAIN DESCRIPTION - LOCATION
LOCATION: CHEST;ABDOMEN
LOCATION: RIB CAGE
LOCATION: ABDOMEN;CHEST
LOCATION: ABDOMEN;CHEST
LOCATION: RIB CAGE
LOCATION: RIB CAGE

## 2023-06-21 ASSESSMENT — PAIN DESCRIPTION - ORIENTATION
ORIENTATION: RIGHT
ORIENTATION: LEFT;RIGHT
ORIENTATION: RIGHT;LEFT

## 2023-06-21 ASSESSMENT — PAIN SCALES - GENERAL
PAINLEVEL_OUTOF10: 10
PAINLEVEL_OUTOF10: 9
PAINLEVEL_OUTOF10: 10
PAINLEVEL_OUTOF10: 10
PAINLEVEL_OUTOF10: 9
PAINLEVEL_OUTOF10: 10

## 2023-06-21 ASSESSMENT — PAIN DESCRIPTION - DESCRIPTORS
DESCRIPTORS: SHARP;STABBING
DESCRIPTORS: ACHING
DESCRIPTORS: DISCOMFORT
DESCRIPTORS: ACHING
DESCRIPTORS: STABBING
DESCRIPTORS: ACHING;DISCOMFORT

## 2023-06-21 ASSESSMENT — PAIN - FUNCTIONAL ASSESSMENT
PAIN_FUNCTIONAL_ASSESSMENT: PREVENTS OR INTERFERES WITH ALL ACTIVE AND SOME PASSIVE ACTIVITIES
PAIN_FUNCTIONAL_ASSESSMENT: ACTIVITIES ARE NOT PREVENTED

## 2023-06-22 PROCEDURE — 97542 WHEELCHAIR MNGMENT TRAINING: CPT

## 2023-06-22 PROCEDURE — 97530 THERAPEUTIC ACTIVITIES: CPT

## 2023-06-22 PROCEDURE — 94761 N-INVAS EAR/PLS OXIMETRY MLT: CPT

## 2023-06-22 PROCEDURE — 1280000000 HC REHAB R&B

## 2023-06-22 PROCEDURE — 97535 SELF CARE MNGMENT TRAINING: CPT

## 2023-06-22 PROCEDURE — 6370000000 HC RX 637 (ALT 250 FOR IP): Performed by: PHYSICAL MEDICINE & REHABILITATION

## 2023-06-22 PROCEDURE — 94640 AIRWAY INHALATION TREATMENT: CPT

## 2023-06-22 PROCEDURE — 97110 THERAPEUTIC EXERCISES: CPT

## 2023-06-22 PROCEDURE — 6370000000 HC RX 637 (ALT 250 FOR IP): Performed by: INTERNAL MEDICINE

## 2023-06-22 PROCEDURE — 94150 VITAL CAPACITY TEST: CPT

## 2023-06-22 PROCEDURE — 6370000000 HC RX 637 (ALT 250 FOR IP): Performed by: NURSE PRACTITIONER

## 2023-06-22 PROCEDURE — 2580000003 HC RX 258: Performed by: INTERNAL MEDICINE

## 2023-06-22 PROCEDURE — 97116 GAIT TRAINING THERAPY: CPT

## 2023-06-22 RX ORDER — CEFDINIR 300 MG/1
300 CAPSULE ORAL EVERY 12 HOURS SCHEDULED
Status: DISCONTINUED | OUTPATIENT
Start: 2023-06-22 | End: 2023-06-23 | Stop reason: HOSPADM

## 2023-06-22 RX ORDER — CEFDINIR 300 MG/1
300 CAPSULE ORAL EVERY 12 HOURS SCHEDULED
Status: DISCONTINUED | OUTPATIENT
Start: 2023-06-22 | End: 2023-06-22

## 2023-06-22 RX ORDER — CLOPIDOGREL BISULFATE 75 MG/1
75 TABLET ORAL DAILY
Qty: 21 TABLET | Refills: 0 | Status: SHIPPED | OUTPATIENT
Start: 2023-06-22 | End: 2023-07-13

## 2023-06-22 RX ORDER — ARIPIPRAZOLE 5 MG/1
TABLET ORAL
Qty: 31 TABLET | Refills: 0 | Status: SHIPPED | OUTPATIENT
Start: 2023-06-22

## 2023-06-22 RX ORDER — MIRTAZAPINE 15 MG/1
15 TABLET, ORALLY DISINTEGRATING ORAL NIGHTLY
Qty: 30 TABLET | Refills: 0 | Status: SHIPPED | OUTPATIENT
Start: 2023-06-22

## 2023-06-22 RX ORDER — OXYCODONE HYDROCHLORIDE AND ACETAMINOPHEN 5; 325 MG/1; MG/1
1 TABLET ORAL EVERY 6 HOURS PRN
Qty: 20 TABLET | Refills: 0 | Status: SHIPPED | OUTPATIENT
Start: 2023-06-22 | End: 2023-06-28

## 2023-06-22 RX ORDER — ATORVASTATIN CALCIUM 40 MG/1
40 TABLET, FILM COATED ORAL EVERY EVENING
Qty: 30 TABLET | Refills: 0 | Status: SHIPPED | OUTPATIENT
Start: 2023-06-22

## 2023-06-22 RX ORDER — OXYCODONE HYDROCHLORIDE AND ACETAMINOPHEN 5; 325 MG/1; MG/1
1 TABLET ORAL EVERY 6 HOURS PRN
Status: DISCONTINUED | OUTPATIENT
Start: 2023-06-22 | End: 2023-06-23 | Stop reason: HOSPADM

## 2023-06-22 RX ORDER — BUDESONIDE AND FORMOTEROL FUMARATE DIHYDRATE 160; 4.5 UG/1; UG/1
2 AEROSOL RESPIRATORY (INHALATION) 2 TIMES DAILY
Qty: 10.2 G | Refills: 0 | Status: SHIPPED | OUTPATIENT
Start: 2023-06-22

## 2023-06-22 RX ORDER — CEFDINIR 300 MG/1
300 CAPSULE ORAL EVERY 12 HOURS SCHEDULED
Qty: 12 CAPSULE | Refills: 0 | Status: SHIPPED | OUTPATIENT
Start: 2023-06-22 | End: 2023-06-28

## 2023-06-22 RX ADMIN — SODIUM CHLORIDE, PRESERVATIVE FREE 10 ML: 5 INJECTION INTRAVENOUS at 09:25

## 2023-06-22 RX ADMIN — CLOPIDOGREL BISULFATE 75 MG: 75 TABLET ORAL at 09:25

## 2023-06-22 RX ADMIN — OXYCODONE AND ACETAMINOPHEN 1 TABLET: 5; 325 TABLET ORAL at 11:15

## 2023-06-22 RX ADMIN — BUDESONIDE AND FORMOTEROL FUMARATE DIHYDRATE 2 PUFF: 160; 4.5 AEROSOL RESPIRATORY (INHALATION) at 09:10

## 2023-06-22 RX ADMIN — POTASSIUM & SODIUM PHOSPHATES POWDER PACK 280-160-250 MG 250 MG: 280-160-250 PACK at 09:25

## 2023-06-22 RX ADMIN — ATORVASTATIN CALCIUM 40 MG: 40 TABLET, FILM COATED ORAL at 22:04

## 2023-06-22 RX ADMIN — BUDESONIDE AND FORMOTEROL FUMARATE DIHYDRATE 2 PUFF: 160; 4.5 AEROSOL RESPIRATORY (INHALATION) at 20:05

## 2023-06-22 RX ADMIN — ACETAMINOPHEN 650 MG: 325 TABLET ORAL at 09:24

## 2023-06-22 RX ADMIN — ARIPIPRAZOLE 2.5 MG: 5 TABLET ORAL at 22:04

## 2023-06-22 RX ADMIN — CEFDINIR 300 MG: 300 CAPSULE ORAL at 18:09

## 2023-06-22 RX ADMIN — MIRTAZAPINE 15 MG: 15 TABLET, ORALLY DISINTEGRATING ORAL at 22:04

## 2023-06-22 RX ADMIN — POTASSIUM & SODIUM PHOSPHATES POWDER PACK 280-160-250 MG 250 MG: 280-160-250 PACK at 18:12

## 2023-06-22 ASSESSMENT — PAIN SCALES - WONG BAKER
WONGBAKER_NUMERICALRESPONSE: 4
WONGBAKER_NUMERICALRESPONSE: 0
WONGBAKER_NUMERICALRESPONSE: 0

## 2023-06-22 ASSESSMENT — PAIN SCALES - GENERAL
PAINLEVEL_OUTOF10: 9
PAINLEVEL_OUTOF10: 10
PAINLEVEL_OUTOF10: 10
PAINLEVEL_OUTOF10: 7
PAINLEVEL_OUTOF10: 8

## 2023-06-22 ASSESSMENT — PAIN DESCRIPTION - LOCATION
LOCATION: RIB CAGE
LOCATION: CHEST;ABDOMEN
LOCATION: CHEST

## 2023-06-22 ASSESSMENT — PAIN DESCRIPTION - DESCRIPTORS
DESCRIPTORS: ACHING;DISCOMFORT
DESCRIPTORS: ACHING
DESCRIPTORS: GNAWING

## 2023-06-22 ASSESSMENT — PAIN DESCRIPTION - ORIENTATION
ORIENTATION: MID
ORIENTATION: RIGHT
ORIENTATION: RIGHT

## 2023-06-22 ASSESSMENT — PAIN - FUNCTIONAL ASSESSMENT
PAIN_FUNCTIONAL_ASSESSMENT: PREVENTS OR INTERFERES SOME ACTIVE ACTIVITIES AND ADLS
PAIN_FUNCTIONAL_ASSESSMENT: ACTIVITIES ARE NOT PREVENTED

## 2023-06-23 VITALS
DIASTOLIC BLOOD PRESSURE: 62 MMHG | WEIGHT: 108.91 LBS | TEMPERATURE: 97.8 F | SYSTOLIC BLOOD PRESSURE: 113 MMHG | HEART RATE: 97 BPM | RESPIRATION RATE: 19 BRPM | HEIGHT: 69 IN | OXYGEN SATURATION: 100 % | BODY MASS INDEX: 16.13 KG/M2

## 2023-06-23 LAB
ANION GAP SERPL CALCULATED.3IONS-SCNC: 13 MMOL/L (ref 4–16)
BUN SERPL-MCNC: 15 MG/DL (ref 6–23)
CALCIUM SERPL-MCNC: 8 MG/DL (ref 8.3–10.6)
CHLORIDE BLD-SCNC: 106 MMOL/L (ref 99–110)
CO2: 17 MMOL/L (ref 21–32)
CREAT SERPL-MCNC: 1.2 MG/DL (ref 0.9–1.3)
GFR SERPL CREATININE-BSD FRML MDRD: >60 ML/MIN/1.73M2
GLUCOSE SERPL-MCNC: 87 MG/DL (ref 70–99)
HCT VFR BLD CALC: 25.6 % (ref 42–52)
HEMOGLOBIN: 7.9 GM/DL (ref 13.5–18)
MCH RBC QN AUTO: 27.7 PG (ref 27–31)
MCHC RBC AUTO-ENTMCNC: 30.9 % (ref 32–36)
MCV RBC AUTO: 89.8 FL (ref 78–100)
PDW BLD-RTO: 14 % (ref 11.7–14.9)
PLATELET # BLD: 560 K/CU MM (ref 140–440)
PMV BLD AUTO: 8.7 FL (ref 7.5–11.1)
POTASSIUM SERPL-SCNC: 4.1 MMOL/L (ref 3.5–5.1)
RBC # BLD: 2.85 M/CU MM (ref 4.6–6.2)
SODIUM BLD-SCNC: 136 MMOL/L (ref 135–145)
WBC # BLD: 21.9 K/CU MM (ref 4–10.5)

## 2023-06-23 PROCEDURE — 6370000000 HC RX 637 (ALT 250 FOR IP): Performed by: PHYSICAL MEDICINE & REHABILITATION

## 2023-06-23 PROCEDURE — 36415 COLL VENOUS BLD VENIPUNCTURE: CPT

## 2023-06-23 PROCEDURE — 94761 N-INVAS EAR/PLS OXIMETRY MLT: CPT

## 2023-06-23 PROCEDURE — 94640 AIRWAY INHALATION TREATMENT: CPT

## 2023-06-23 PROCEDURE — 85027 COMPLETE CBC AUTOMATED: CPT

## 2023-06-23 PROCEDURE — 6370000000 HC RX 637 (ALT 250 FOR IP): Performed by: INTERNAL MEDICINE

## 2023-06-23 PROCEDURE — 80048 BASIC METABOLIC PNL TOTAL CA: CPT

## 2023-06-23 RX ADMIN — POTASSIUM & SODIUM PHOSPHATES POWDER PACK 280-160-250 MG 250 MG: 280-160-250 PACK at 09:52

## 2023-06-23 RX ADMIN — OXYCODONE HYDROCHLORIDE AND ACETAMINOPHEN 1 TABLET: 5; 325 TABLET ORAL at 09:25

## 2023-06-23 RX ADMIN — BUDESONIDE AND FORMOTEROL FUMARATE DIHYDRATE 2 PUFF: 160; 4.5 AEROSOL RESPIRATORY (INHALATION) at 08:02

## 2023-06-23 RX ADMIN — OXYCODONE HYDROCHLORIDE AND ACETAMINOPHEN 1 TABLET: 5; 325 TABLET ORAL at 02:33

## 2023-06-23 RX ADMIN — CEFDINIR 300 MG: 300 CAPSULE ORAL at 09:25

## 2023-06-23 RX ADMIN — OXYCODONE HYDROCHLORIDE AND ACETAMINOPHEN 1 TABLET: 5; 325 TABLET ORAL at 16:42

## 2023-06-23 RX ADMIN — CLOPIDOGREL BISULFATE 75 MG: 75 TABLET ORAL at 09:25

## 2023-06-23 ASSESSMENT — PAIN DESCRIPTION - LOCATION
LOCATION: GENERALIZED
LOCATION: ABDOMEN;CHEST

## 2023-06-23 ASSESSMENT — PAIN SCALES - GENERAL
PAINLEVEL_OUTOF10: 9
PAINLEVEL_OUTOF10: 6

## 2023-06-23 ASSESSMENT — PAIN DESCRIPTION - ORIENTATION: ORIENTATION: MID

## 2023-06-23 ASSESSMENT — PAIN DESCRIPTION - DESCRIPTORS: DESCRIPTORS: ACHING;SHARP;PRESSURE

## 2023-06-28 ENCOUNTER — HOSPITAL ENCOUNTER (OUTPATIENT)
Dept: INFUSION THERAPY | Age: 58
Discharge: HOME | End: 2023-06-28
Payer: COMMERCIAL

## 2023-06-28 ENCOUNTER — OFFICE VISIT (OUTPATIENT)
Dept: ONCOLOGY | Age: 58
End: 2023-06-28
Payer: COMMERCIAL

## 2023-06-28 VITALS
SYSTOLIC BLOOD PRESSURE: 118 MMHG | TEMPERATURE: 97.2 F | WEIGHT: 102 LBS | DIASTOLIC BLOOD PRESSURE: 65 MMHG | BODY MASS INDEX: 15.11 KG/M2 | HEIGHT: 69 IN | HEART RATE: 113 BPM

## 2023-06-28 DIAGNOSIS — C34.90 SMALL CELL CARCINOMA OF LUNG (HCC): Primary | ICD-10-CM

## 2023-06-28 DIAGNOSIS — C34.91 SQUAMOUS CARCINOMA OF LUNG, RIGHT (HCC): ICD-10-CM

## 2023-06-28 DIAGNOSIS — G89.3 CANCER RELATED PAIN: ICD-10-CM

## 2023-06-28 PROCEDURE — 1036F TOBACCO NON-USER: CPT | Performed by: PHYSICIAN ASSISTANT

## 2023-06-28 PROCEDURE — 3074F SYST BP LT 130 MM HG: CPT | Performed by: PHYSICIAN ASSISTANT

## 2023-06-28 PROCEDURE — G8427 DOCREV CUR MEDS BY ELIG CLIN: HCPCS | Performed by: PHYSICIAN ASSISTANT

## 2023-06-28 PROCEDURE — 3078F DIAST BP <80 MM HG: CPT | Performed by: PHYSICIAN ASSISTANT

## 2023-06-28 PROCEDURE — G8419 CALC BMI OUT NRM PARAM NOF/U: HCPCS | Performed by: PHYSICIAN ASSISTANT

## 2023-06-28 PROCEDURE — 99215 OFFICE O/P EST HI 40 MIN: CPT | Performed by: PHYSICIAN ASSISTANT

## 2023-06-28 PROCEDURE — 1111F DSCHRG MED/CURRENT MED MERGE: CPT | Performed by: PHYSICIAN ASSISTANT

## 2023-06-28 PROCEDURE — 99211 OFF/OP EST MAY X REQ PHY/QHP: CPT

## 2023-06-28 PROCEDURE — 3017F COLORECTAL CA SCREEN DOC REV: CPT | Performed by: PHYSICIAN ASSISTANT

## 2023-06-28 RX ORDER — OXYCODONE AND ACETAMINOPHEN 7.5; 325 MG/1; MG/1
1 TABLET ORAL EVERY 4 HOURS PRN
Qty: 84 TABLET | Refills: 0 | Status: SHIPPED | OUTPATIENT
Start: 2023-06-28 | End: 2023-07-12

## 2023-06-29 ENCOUNTER — CLINICAL DOCUMENTATION (OUTPATIENT)
Dept: ONCOLOGY | Age: 58
End: 2023-06-29

## 2023-06-29 ENCOUNTER — TELEPHONE (OUTPATIENT)
Dept: RADIATION ONCOLOGY | Age: 58
End: 2023-06-29

## 2023-07-05 ENCOUNTER — HOSPITAL ENCOUNTER (OUTPATIENT)
Dept: RADIATION ONCOLOGY | Age: 58
Discharge: HOME | End: 2023-07-05

## 2023-07-07 ENCOUNTER — TELEPHONE (OUTPATIENT)
Dept: RADIATION ONCOLOGY | Age: 58
End: 2023-07-07

## 2023-07-07 RX ORDER — SODIUM CHLORIDE 0.9 % (FLUSH) 0.9 %
5-40 SYRINGE (ML) INJECTION PRN
Status: DISCONTINUED | OUTPATIENT
Start: 2023-07-07 | End: 2023-07-13 | Stop reason: HOSPADM

## 2023-07-07 RX ORDER — SODIUM CHLORIDE 9 MG/ML
INJECTION, SOLUTION INTRAVENOUS CONTINUOUS
Status: DISCONTINUED | OUTPATIENT
Start: 2023-07-07 | End: 2023-07-13 | Stop reason: HOSPADM

## 2023-07-07 NOTE — TELEPHONE ENCOUNTER
Follow up phone call placed to pt regarding missed appointment 7/5/23 with Dr. Richardson and unanswered calls. Pt reports having trouble with phone and forgot about appts at [facility] on Wednesday. Pt rescheduled for follow up with Dr. Richardson on Wednesday 7/12/23 at 1:45 with CT/SIM for radiation planning to follow at 2:00 PM. Pt instructed to be NPO x 4 for poss IV contrast during CT, No know drug allergies noted, 6/23/2023 GFR reviewed. Pt voices understanding, direct contact info given, advised to call with any questions or concerns.

## 2023-07-12 ENCOUNTER — HOSPITAL ENCOUNTER (OUTPATIENT)
Dept: RADIATION ONCOLOGY | Age: 58
Discharge: HOME | End: 2023-07-12
Payer: COMMERCIAL

## 2023-07-12 ENCOUNTER — APPOINTMENT (OUTPATIENT)
Dept: RADIATION ONCOLOGY | Age: 58
End: 2023-07-12
Payer: COMMERCIAL

## 2023-07-12 VITALS
RESPIRATION RATE: 14 BRPM | TEMPERATURE: 97.7 F | HEIGHT: 69 IN | DIASTOLIC BLOOD PRESSURE: 60 MMHG | HEART RATE: 106 BPM | WEIGHT: 94.2 LBS | BODY MASS INDEX: 13.95 KG/M2 | SYSTOLIC BLOOD PRESSURE: 100 MMHG

## 2023-07-12 PROCEDURE — 77470 SPECIAL RADIATION TREATMENT: CPT | Performed by: RADIOLOGY

## 2023-07-12 PROCEDURE — 77334 RADIATION TREATMENT AID(S): CPT | Performed by: RADIOLOGY

## 2023-07-12 PROCEDURE — 6360000004 HC RX CONTRAST MEDICATION

## 2023-07-12 PROCEDURE — 99214 OFFICE O/P EST MOD 30 MIN: CPT | Performed by: RADIOLOGY

## 2023-07-12 PROCEDURE — 77332 RADIATION TREATMENT AID(S): CPT | Performed by: RADIOLOGY

## 2023-07-12 ASSESSMENT — PAIN SCALES - GENERAL: PAINLEVEL_OUTOF10: 10

## 2023-07-12 ASSESSMENT — PAIN DESCRIPTION - FREQUENCY: FREQUENCY: CONTINUOUS

## 2023-07-12 ASSESSMENT — PAIN DESCRIPTION - LOCATION: LOCATION: CHEST

## 2023-07-12 ASSESSMENT — PAIN DESCRIPTION - PAIN TYPE: TYPE: CHRONIC PAIN

## 2023-07-12 NOTE — PROGRESS NOTES
[place] [id number] 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 male who has a history as above who returns today for routine follow-up visit. He was initially seen by me in consultation in the hospital on 2023. He was recommended radiation therapy with concurrent chemotherapy. He is doing well and has no new problems or complaints. He saw medical oncology recently and refused systemic therapy. He still does not want to do chemotherapy. He has had loss of appetite and some weight loss since I saw him in the hospital a month ago. He is on pain medication given by oncology. He has pain in the right chest area. RADIOLOGIC STUDIES:  XR CHEST PORTABLE    Result Date: 2023  EXAMINATION: ONE XRAY VIEW OF THE CHEST 2023 12:33 pm COMPARISON: 2023 HISTORY: ORDERING SYSTEM PROVIDED HISTORY: lung mass TECHNOLOGIST PROVIDED HISTORY: Reason for exam:->lung mass Reason for Exam: lung mass FINDINGS: Right upper lobe lung mass. Probable areas of scarring within the left lung. No confluent infiltrate or pneumothorax. Cardiac and mediastinal silhouettes are within normal limits. Right upper lobe lung mass. No acute pulmonary process identified. XR CHEST PORTABLE    Result Date: 2023  EXAMINATION: ONE X-RAY VIEW OF THE CHEST 2023 4:04 pm COMPARISON: 2023 chest x-ray and CT. HISTORY: ORDERING SYSTEM PROVIDED HISTORY:  3.5 hours post right upper lobe biopsy TECHNOLOGIST PROVIDED HISTORY: Reason for Exam:  3.5 hours post right upper lobe biopsy.  Additional signs and

## 2023-07-12 NOTE — PLAN OF CARE
Radiation education and handouts given. Side effects and management reviewed with pt. All questions answered and pt voices understanding . #22 PIV placed in right fore arm x2 attempts for IV contrast.     Nursing Care Plan for Chest Radiation    Pain related to cancer diagnosis and treatment. Interventions   Pain assessment. Monitor pharmacological pain medication. Teach relaxation and repositioning techniques. Expected Outcome   Maintain patient's acceptable pain level or <5 on pain scale. Knowledge deficit related to diagnosis and treatment plan. Interventions   Assess patient's ability to comprehend diagnosis and treatment plan. Provide educational materials and teaching regarding plan of care. Provide emotional support and continued education. Refer to psychosocial coordinator if further assistance needed. Expected Outcome   Patient voices understanding of diagnosis and treatment plan. Altered respiratory status related to diagnosis and treatment. Interventions   Assess respiratory status, note changes. Educate patient on symptoms to report to staff. Refer to smoking cessation program if needed. Expected Outcome   No respiratory complications, patient with SPO2 >90% or equal to baseline. Altered skin integrity related to treatment. Interventions   Evaluation of skin integrity at therapy site. Advise patient on appropriate skin care. Instruct patient on recommended lotions/ointments/creams/dressing changes to use on therapy site. Expected Outcome   Minimize adverse skin reaction/infection at treatment site. Altered nutritional status due to treatment process. Interventions   Initial nutritional assessment. Assess weight and intake during treatment. Management of treatment side effects. Refer to nutritional class/evaluation. Expected Outcome   Patient's weight loss <10% from initial assessment.        To re-evaluate weekly during

## 2023-07-13 ENCOUNTER — APPOINTMENT (OUTPATIENT)
Dept: RADIATION ONCOLOGY | Age: 58
End: 2023-07-13
Payer: COMMERCIAL

## 2023-07-14 ENCOUNTER — APPOINTMENT (OUTPATIENT)
Dept: RADIATION ONCOLOGY | Age: 58
End: 2023-07-14
Payer: COMMERCIAL

## 2023-07-17 ENCOUNTER — APPOINTMENT (OUTPATIENT)
Dept: RADIATION ONCOLOGY | Age: 58
End: 2023-07-17
Payer: COMMERCIAL

## 2023-07-18 ENCOUNTER — APPOINTMENT (OUTPATIENT)
Dept: RADIATION ONCOLOGY | Age: 58
End: 2023-07-18
Payer: COMMERCIAL

## 2023-07-19 ENCOUNTER — TELEPHONE (OUTPATIENT)
Dept: INFUSION THERAPY | Age: 58
End: 2023-07-19

## 2023-07-19 ENCOUNTER — HOSPITAL ENCOUNTER (OUTPATIENT)
Dept: INFUSION THERAPY | Age: 58
Discharge: HOME | End: 2023-07-19
Payer: COMMERCIAL

## 2023-07-19 ENCOUNTER — OFFICE VISIT (OUTPATIENT)
Dept: ONCOLOGY | Age: 58
End: 2023-07-19
Payer: COMMERCIAL

## 2023-07-19 ENCOUNTER — CLINICAL DOCUMENTATION (OUTPATIENT)
Dept: INFUSION THERAPY | Age: 58
End: 2023-07-19

## 2023-07-19 ENCOUNTER — APPOINTMENT (OUTPATIENT)
Dept: RADIATION ONCOLOGY | Age: 58
End: 2023-07-19
Payer: COMMERCIAL

## 2023-07-19 VITALS
HEART RATE: 111 BPM | TEMPERATURE: 97.1 F | BODY MASS INDEX: 13.66 KG/M2 | DIASTOLIC BLOOD PRESSURE: 60 MMHG | RESPIRATION RATE: 14 BRPM | SYSTOLIC BLOOD PRESSURE: 101 MMHG | WEIGHT: 92.2 LBS | HEIGHT: 69 IN

## 2023-07-19 DIAGNOSIS — G89.3 CANCER RELATED PAIN: ICD-10-CM

## 2023-07-19 DIAGNOSIS — R76.8 HEPATITIS C ANTIBODY TEST POSITIVE: ICD-10-CM

## 2023-07-19 DIAGNOSIS — D72.829 LEUKOCYTOSIS, UNSPECIFIED TYPE: Primary | ICD-10-CM

## 2023-07-19 DIAGNOSIS — D72.829 LEUKOCYTOSIS, UNSPECIFIED TYPE: ICD-10-CM

## 2023-07-19 DIAGNOSIS — C34.90 SMALL CELL CARCINOMA OF LUNG (HCC): ICD-10-CM

## 2023-07-19 DIAGNOSIS — R79.89 ELEVATED FERRITIN: ICD-10-CM

## 2023-07-19 DIAGNOSIS — C34.11 MALIGNANT NEOPLASM OF UPPER LOBE OF RIGHT LUNG (HCC): ICD-10-CM

## 2023-07-19 LAB
ALBUMIN SERPL-MCNC: 3.5 GM/DL (ref 3.4–5)
ALP BLD-CCNC: 134 IU/L (ref 40–128)
ALT SERPL-CCNC: 12 U/L (ref 10–40)
ANION GAP SERPL CALCULATED.3IONS-SCNC: 13 MMOL/L (ref 4–16)
AST SERPL-CCNC: 14 IU/L (ref 15–37)
BASOPHILS ABSOLUTE: 0 K/CU MM
BASOPHILS RELATIVE PERCENT: 0.1 % (ref 0–1)
BILIRUB SERPL-MCNC: 0.5 MG/DL (ref 0–1)
BUN SERPL-MCNC: 11 MG/DL (ref 6–23)
CALCIUM SERPL-MCNC: 13.3 MG/DL (ref 8.3–10.6)
CHLORIDE BLD-SCNC: 97 MMOL/L (ref 99–110)
CO2: 24 MMOL/L (ref 21–32)
CREAT SERPL-MCNC: 1 MG/DL (ref 0.9–1.3)
DIFFERENTIAL TYPE: ABNORMAL
EOSINOPHILS ABSOLUTE: 0.2 K/CU MM
EOSINOPHILS RELATIVE PERCENT: 0.3 % (ref 0–3)
GFR SERPL CREATININE-BSD FRML MDRD: >60 ML/MIN/1.73M2
GLUCOSE SERPL-MCNC: 99 MG/DL (ref 70–99)
HCT VFR BLD CALC: 32 % (ref 42–52)
HEMOGLOBIN: 9.7 GM/DL (ref 13.5–18)
LYMPHOCYTES ABSOLUTE: 3 K/CU MM
LYMPHOCYTES RELATIVE PERCENT: 6.7 % (ref 24–44)
MCH RBC QN AUTO: 26.2 PG (ref 27–31)
MCHC RBC AUTO-ENTMCNC: 30.3 % (ref 32–36)
MCV RBC AUTO: 86.5 FL (ref 78–100)
MONOCYTES ABSOLUTE: 3.2 K/CU MM
MONOCYTES RELATIVE PERCENT: 7.2 % (ref 0–4)
PDW BLD-RTO: 14.9 % (ref 11.7–14.9)
PLATELET # BLD: 771 K/CU MM (ref 140–440)
PMV BLD AUTO: 8.4 FL (ref 7.5–11.1)
POTASSIUM SERPL-SCNC: 3.7 MMOL/L (ref 3.5–5.1)
RBC # BLD: 3.7 M/CU MM (ref 4.6–6.2)
SEGMENTED NEUTROPHILS ABSOLUTE COUNT: 38 K/CU MM
SEGMENTED NEUTROPHILS RELATIVE PERCENT: 85.7 % (ref 36–66)
SODIUM BLD-SCNC: 134 MMOL/L (ref 135–145)
TOTAL PROTEIN: 8 GM/DL (ref 6.4–8.2)
WBC # BLD: 44.3 K/CU MM (ref 4–10.5)

## 2023-07-19 PROCEDURE — 99211 OFF/OP EST MAY X REQ PHY/QHP: CPT

## 2023-07-19 PROCEDURE — 85025 COMPLETE CBC W/AUTO DIFF WBC: CPT

## 2023-07-19 PROCEDURE — G8419 CALC BMI OUT NRM PARAM NOF/U: HCPCS | Performed by: INTERNAL MEDICINE

## 2023-07-19 PROCEDURE — G8428 CUR MEDS NOT DOCUMENT: HCPCS | Performed by: INTERNAL MEDICINE

## 2023-07-19 PROCEDURE — 80053 COMPREHEN METABOLIC PANEL: CPT

## 2023-07-19 PROCEDURE — 1111F DSCHRG MED/CURRENT MED MERGE: CPT | Performed by: INTERNAL MEDICINE

## 2023-07-19 PROCEDURE — 99214 OFFICE O/P EST MOD 30 MIN: CPT | Performed by: INTERNAL MEDICINE

## 2023-07-19 PROCEDURE — 36415 COLL VENOUS BLD VENIPUNCTURE: CPT

## 2023-07-19 PROCEDURE — 3074F SYST BP LT 130 MM HG: CPT | Performed by: INTERNAL MEDICINE

## 2023-07-19 PROCEDURE — 87522 HEPATITIS C REVRS TRNSCRPJ: CPT

## 2023-07-19 PROCEDURE — 1036F TOBACCO NON-USER: CPT | Performed by: INTERNAL MEDICINE

## 2023-07-19 PROCEDURE — 81256 HFE GENE: CPT

## 2023-07-19 PROCEDURE — 3078F DIAST BP <80 MM HG: CPT | Performed by: INTERNAL MEDICINE

## 2023-07-19 PROCEDURE — 3017F COLORECTAL CA SCREEN DOC REV: CPT | Performed by: INTERNAL MEDICINE

## 2023-07-19 RX ORDER — OXYCODONE AND ACETAMINOPHEN 7.5; 325 MG/1; MG/1
1 TABLET ORAL EVERY 4 HOURS PRN
Qty: 84 TABLET | Refills: 0 | Status: SHIPPED | OUTPATIENT
Start: 2023-07-19 | End: 2023-08-02

## 2023-07-19 NOTE — CARE COORDINATION
LSW spoke to Pt at the request of Lung Care Coordinator. Pt is starting cancer treatment and will need help with transportation. Pt has [employer]. LSW informed Pt about [employer] transportation and the [employer] program. Pt stated he has used [employer] transportation in the past but lost his wallet and does not have a copy of his insurance card. LSW provided a copy along with LSW's contact information and offered to assist Pt with scheduling transportation once treatment is scheduled. Pt expressed understanding.

## 2023-07-19 NOTE — PROGRESS NOTES
MA Rooming Questions  Patient: Poncho Vickers  MRN: [id number]    Date: 7/19/2023        1. Do you have any new issues? yes - skin/stomach looks/feels like a potato chip. Cyst x 4 on his back         2. Do you need any refills on medications? yes - Percocet    3. Have you had any imaging done since your last visit?   no    4. Have you been hospitalized or seen in the emergency room since your last visit here?   no    5. Did the patient have a depression screening completed today?  No    No data recorded     PHQ-9 Given to (if applicable):               PHQ-9 Score (if applicable):                     [] Positive     []  Negative              Does question #9 need addressed (if applicable)                     [] Yes    []  No               Israel Britton MA

## 2023-07-20 ENCOUNTER — APPOINTMENT (OUTPATIENT)
Dept: RADIATION ONCOLOGY | Age: 58
End: 2023-07-20
Payer: COMMERCIAL

## 2023-07-20 ENCOUNTER — HOSPITAL ENCOUNTER (OUTPATIENT)
Dept: RADIATION ONCOLOGY | Age: 58
Discharge: HOME | End: 2023-07-20
Payer: COMMERCIAL

## 2023-07-20 ENCOUNTER — NURSE ONLY (OUTPATIENT)
Dept: ONCOLOGY | Age: 58
End: 2023-07-20

## 2023-07-20 ENCOUNTER — HOSPITAL ENCOUNTER (OUTPATIENT)
Dept: INFUSION THERAPY | Age: 58
Discharge: HOME | End: 2023-07-20

## 2023-07-20 VITALS
HEART RATE: 87 BPM | BODY MASS INDEX: 13.63 KG/M2 | TEMPERATURE: 96.8 F | DIASTOLIC BLOOD PRESSURE: 55 MMHG | SYSTOLIC BLOOD PRESSURE: 99 MMHG | HEIGHT: 69 IN | WEIGHT: 92 LBS

## 2023-07-20 DIAGNOSIS — C34.90 SMALL CELL CARCINOMA OF LUNG (HCC): Primary | ICD-10-CM

## 2023-07-20 PROCEDURE — 77386 HC NTSTY MODUL RAD TX DLVR CPLX: CPT | Performed by: RADIOLOGY

## 2023-07-20 RX ORDER — DEXAMETHASONE 4 MG/1
4 TABLET ORAL SEE ADMIN INSTRUCTIONS
Qty: 15 TABLET | Refills: 0 | Status: SHIPPED | OUTPATIENT
Start: 2023-07-20 | End: 2023-07-27

## 2023-07-20 RX ORDER — LOPERAMIDE HYDROCHLORIDE 2 MG/1
2 CAPSULE ORAL 4 TIMES DAILY PRN
Qty: 20 CAPSULE | Refills: 0 | Status: SHIPPED | OUTPATIENT
Start: 2023-07-20 | End: 2023-07-30

## 2023-07-20 RX ORDER — ONDANSETRON HYDROCHLORIDE 8 MG/1
8 TABLET, FILM COATED ORAL EVERY 8 HOURS PRN
Qty: 30 TABLET | Refills: 1 | Status: SHIPPED | OUTPATIENT
Start: 2023-07-20

## 2023-07-20 RX ORDER — DOCUSATE SODIUM 100 MG/1
100 CAPSULE, LIQUID FILLED ORAL 2 TIMES DAILY
Qty: 60 CAPSULE | Refills: 0 | Status: SHIPPED | OUTPATIENT
Start: 2023-07-20 | End: 2023-08-19

## 2023-07-20 NOTE — PROGRESS NOTES
Patient arrived with his nephew for treatment planning and chemotherapy education. Discussed treatment plan, potential side effects, side effect prevention, and symptom management. Reviewed chemotherapy education folder and drug monograph. Patient's regimen will be Carboplatin and Taxol once every 7 days x7 cycles    Patient signed chemotherapy consent for Carboplatin dn Taxol  x 7 cycles. Copy of consent given to patient. Reviewed chemotherapy schedule/calendar. Rx for Zofran, Olanzapine and Dexamethasone sent to pharmacy per this RN as per Dr. Callie Chavez order. Patient given calendar and written instructions for these medications and how/when to take. Prescriptions given as follows:   Zofran 8 mg PRN for nausea  Dexamethasone (4 mg tablets ordered)  4 mg PO the night before chemotherapy and 4 mg PO daily for the 2 days after chemotherapy  Colace PO BID   Loperamide up to 4x daily PRN for diarrhea    Patient given on-call phone number for after office hours and weekends. This RN direct contact information given to patient for patient to call with any questions/concerns. .    Distress thermometer given to patient to fill out - this RN reviewed with patient. Referrals placed for dietician yesterday as per Dr. Callie Chavez. Copy given to Psychosocial Coordinator who has met with patient.

## 2023-07-20 NOTE — PROGRESS NOTES
NEGAR FERGUSON NOTE    Patient: [facility]  MRN: [id number]    Date: 7/20/2023        Treatment planning               Kinjal Cadena CMA

## 2023-07-21 ENCOUNTER — HOSPITAL ENCOUNTER (OUTPATIENT)
Dept: INFUSION THERAPY | Age: 58
Discharge: HOME | End: 2023-07-21
Payer: COMMERCIAL

## 2023-07-21 ENCOUNTER — APPOINTMENT (OUTPATIENT)
Dept: RADIATION ONCOLOGY | Age: 58
End: 2023-07-21
Payer: COMMERCIAL

## 2023-07-21 ENCOUNTER — HOSPITAL ENCOUNTER (OUTPATIENT)
Dept: RADIATION ONCOLOGY | Age: 58
Discharge: HOME | End: 2023-07-21
Payer: COMMERCIAL

## 2023-07-21 VITALS — HEART RATE: 92 BPM | SYSTOLIC BLOOD PRESSURE: 93 MMHG | TEMPERATURE: 98.1 F | DIASTOLIC BLOOD PRESSURE: 56 MMHG

## 2023-07-21 DIAGNOSIS — C34.11 MALIGNANT NEOPLASM OF UPPER LOBE OF RIGHT LUNG (HCC): Primary | ICD-10-CM

## 2023-07-21 LAB
HCV RNA SERPL NAA+PROBE-ACNC: ABNORMAL IU/ML
HCV RNA SERPL NAA+PROBE-LOG IU: 6.98 LOG IU/ML
HCV RNA SERPL QL NAA+PROBE: DETECTED

## 2023-07-21 PROCEDURE — 2500000003 HC RX 250 WO HCPCS: Performed by: INTERNAL MEDICINE

## 2023-07-21 PROCEDURE — 2580000003 HC RX 258: Performed by: INTERNAL MEDICINE

## 2023-07-21 PROCEDURE — 77386 HC NTSTY MODUL RAD TX DLVR CPLX: CPT | Performed by: RADIOLOGY

## 2023-07-21 PROCEDURE — 96375 TX/PRO/DX INJ NEW DRUG ADDON: CPT

## 2023-07-21 PROCEDURE — 6360000002 HC RX W HCPCS: Performed by: INTERNAL MEDICINE

## 2023-07-21 PROCEDURE — 96413 CHEMO IV INFUSION 1 HR: CPT

## 2023-07-21 PROCEDURE — 96417 CHEMO IV INFUS EACH ADDL SEQ: CPT

## 2023-07-21 RX ORDER — PALONOSETRON 0.05 MG/ML
0.25 INJECTION, SOLUTION INTRAVENOUS ONCE
Status: COMPLETED | OUTPATIENT
Start: 2023-07-21 | End: 2023-07-21

## 2023-07-21 RX ORDER — MEPERIDINE HYDROCHLORIDE 50 MG/ML
12.5 INJECTION INTRAMUSCULAR; INTRAVENOUS; SUBCUTANEOUS PRN
Status: CANCELLED | OUTPATIENT
Start: 2023-07-21

## 2023-07-21 RX ORDER — ACETAMINOPHEN 325 MG/1
650 TABLET ORAL
Status: CANCELLED | OUTPATIENT
Start: 2023-07-21

## 2023-07-21 RX ORDER — SODIUM CHLORIDE 9 MG/ML
5-250 INJECTION, SOLUTION INTRAVENOUS PRN
Status: CANCELLED | OUTPATIENT
Start: 2023-07-21

## 2023-07-21 RX ORDER — FAMOTIDINE 10 MG/ML
20 INJECTION, SOLUTION INTRAVENOUS ONCE
Status: COMPLETED | OUTPATIENT
Start: 2023-07-21 | End: 2023-07-21

## 2023-07-21 RX ORDER — FAMOTIDINE 10 MG/ML
20 INJECTION, SOLUTION INTRAVENOUS
Status: CANCELLED | OUTPATIENT
Start: 2023-07-21

## 2023-07-21 RX ORDER — DIPHENHYDRAMINE HYDROCHLORIDE 50 MG/ML
50 INJECTION INTRAMUSCULAR; INTRAVENOUS
Status: CANCELLED | OUTPATIENT
Start: 2023-07-21

## 2023-07-21 RX ORDER — PALONOSETRON 0.05 MG/ML
0.25 INJECTION, SOLUTION INTRAVENOUS ONCE
Status: CANCELLED | OUTPATIENT
Start: 2023-07-21 | End: 2023-07-21

## 2023-07-21 RX ORDER — SODIUM CHLORIDE 9 MG/ML
INJECTION, SOLUTION INTRAVENOUS CONTINUOUS
Status: CANCELLED | OUTPATIENT
Start: 2023-07-21

## 2023-07-21 RX ORDER — SODIUM CHLORIDE 9 MG/ML
5-250 INJECTION, SOLUTION INTRAVENOUS PRN
Status: DISCONTINUED | OUTPATIENT
Start: 2023-07-21 | End: 2023-07-22 | Stop reason: HOSPADM

## 2023-07-21 RX ORDER — ONDANSETRON 2 MG/ML
8 INJECTION INTRAMUSCULAR; INTRAVENOUS
Status: CANCELLED | OUTPATIENT
Start: 2023-07-21

## 2023-07-21 RX ORDER — DIPHENHYDRAMINE HYDROCHLORIDE 50 MG/ML
50 INJECTION INTRAMUSCULAR; INTRAVENOUS ONCE
Status: COMPLETED | OUTPATIENT
Start: 2023-07-21 | End: 2023-07-21

## 2023-07-21 RX ORDER — DIPHENHYDRAMINE HYDROCHLORIDE 50 MG/ML
50 INJECTION INTRAMUSCULAR; INTRAVENOUS ONCE
Status: CANCELLED | OUTPATIENT
Start: 2023-07-21 | End: 2023-07-21

## 2023-07-21 RX ORDER — EPINEPHRINE 1 MG/ML
0.3 INJECTION, SOLUTION, CONCENTRATE INTRAVENOUS PRN
Status: CANCELLED | OUTPATIENT
Start: 2023-07-21

## 2023-07-21 RX ORDER — ALBUTEROL SULFATE 90 UG/1
4 AEROSOL, METERED RESPIRATORY (INHALATION) PRN
Status: CANCELLED | OUTPATIENT
Start: 2023-07-21

## 2023-07-21 RX ORDER — SODIUM CHLORIDE 0.9 % (FLUSH) 0.9 %
5-40 SYRINGE (ML) INJECTION PRN
Status: CANCELLED | OUTPATIENT
Start: 2023-07-21

## 2023-07-21 RX ORDER — FAMOTIDINE 10 MG/ML
20 INJECTION, SOLUTION INTRAVENOUS ONCE
Status: CANCELLED | OUTPATIENT
Start: 2023-07-21 | End: 2023-07-21

## 2023-07-21 RX ORDER — HEPARIN SODIUM (PORCINE) LOCK FLUSH IV SOLN 100 UNIT/ML 100 UNIT/ML
500 SOLUTION INTRAVENOUS PRN
Status: CANCELLED | OUTPATIENT
Start: 2023-07-21

## 2023-07-21 RX ADMIN — DEXAMETHASONE SODIUM PHOSPHATE 12 MG: 4 INJECTION INTRA-ARTICULAR; INTRALESIONAL; INTRAMUSCULAR; INTRAVENOUS; SOFT TISSUE at 09:40

## 2023-07-21 RX ADMIN — SODIUM CHLORIDE 20 ML/HR: 9 INJECTION, SOLUTION INTRAVENOUS at 09:40

## 2023-07-21 RX ADMIN — PALONOSETRON 0.25 MG: 0.25 INJECTION, SOLUTION INTRAVENOUS at 09:30

## 2023-07-21 RX ADMIN — CARBOPLATIN 146 MG: 10 INJECTION, SOLUTION INTRAVENOUS at 11:52

## 2023-07-21 RX ADMIN — FAMOTIDINE 20 MG: 10 INJECTION, SOLUTION INTRAVENOUS at 09:30

## 2023-07-21 RX ADMIN — PACLITAXEL 66 MG: 6 INJECTION, SOLUTION INTRAVENOUS at 10:14

## 2023-07-21 RX ADMIN — DIPHENHYDRAMINE HYDROCHLORIDE 50 MG: 50 INJECTION INTRAMUSCULAR; INTRAVENOUS at 09:30

## 2023-07-21 NOTE — PROGRESS NOTES
Patient arrived  to infusion area today, for PIV access access, Pre medication and first chemotherapy infusion. PIV started on Rt forearm by Bea Gómez RN, Good  blood return noted. Currently has no issues to address with physician at this time. Treatment authorized and administered as ordered. Pt. tolerated well and left treatment area  in stable condition, AVS provided. Status appropriately assessed and documented. All required labs and results reviewed. Treatment approved by provider. Treatment orders and medications verified by 2 Registered Nurses where applicable. Treatment plan was confirmed with patient prior to administration, and educated the need to report any treatment-related symptoms. Prior to administration, when applicable, the following 8 elements of medication administration were reviewed with 2nd Registered Nurse prior to dosing: drug name, drug dose, infusion volume when prepared in a syringe, rate of administration, expiration dates and/or times, appearance and integrity of drug(s), and rate of pump for infusion.   The 5 rights of medication administration have been verified

## 2023-07-23 LAB
HFE GENE MUT ANL BLD/T: NORMAL
HFE P.C282Y BLD/T QL: NEGATIVE
HFE P.H63D BLD/T QL: NEGATIVE
HFE P.S65C BLD/T QL: NEGATIVE
SPECIMEN SOURCE: NORMAL

## 2023-07-24 ENCOUNTER — APPOINTMENT (OUTPATIENT)
Dept: RADIATION ONCOLOGY | Age: 58
End: 2023-07-24
Payer: COMMERCIAL

## 2023-07-24 ENCOUNTER — HOSPITAL ENCOUNTER (OUTPATIENT)
Dept: RADIATION ONCOLOGY | Age: 58
Discharge: HOME | End: 2023-07-24
Payer: COMMERCIAL

## 2023-07-24 PROCEDURE — 77014 CHG CT GUIDANCE RADIATION THERAPY FLDS PLACEMENT: CPT | Performed by: RADIOLOGY

## 2023-07-24 PROCEDURE — 77386 HC NTSTY MODUL RAD TX DLVR CPLX: CPT | Performed by: RADIOLOGY

## 2023-07-25 ENCOUNTER — APPOINTMENT (OUTPATIENT)
Dept: RADIATION ONCOLOGY | Age: 58
End: 2023-07-25
Payer: COMMERCIAL

## 2023-07-25 ENCOUNTER — HOSPITAL ENCOUNTER (OUTPATIENT)
Dept: RADIATION ONCOLOGY | Age: 58
Discharge: HOME | End: 2023-07-25
Payer: COMMERCIAL

## 2023-07-25 VITALS — BODY MASS INDEX: 14.24 KG/M2 | WEIGHT: 96.4 LBS

## 2023-07-25 PROCEDURE — 77014 CHG CT GUIDANCE RADIATION THERAPY FLDS PLACEMENT: CPT | Performed by: RADIOLOGY

## 2023-07-25 PROCEDURE — 77386 HC NTSTY MODUL RAD TX DLVR CPLX: CPT | Performed by: RADIOLOGY

## 2023-07-25 ASSESSMENT — PAIN SCALES - GENERAL: PAINLEVEL_OUTOF10: 0

## 2023-07-25 NOTE — PROGRESS NOTES
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 Energy fairly good. No skin itching/soreness. Breathing stable.  No dysphagia/odynophagia      EXAM  Wt Readings from Last 3 Encounters:   07/20/23 92 lb (41.7 kg)   07/19/23 92 lb 3.2 oz (41.8 kg)   07/12/23 94 lb 3.2 oz (42.7 kg)     NAD      Setup images, chart, plan reviewed    A/P:   Tolerating RT well  Continue RT as planned      Electronically signed by Henny Bowser MD on 7/25/2023 at 11:08 AM
No

## 2023-07-26 ENCOUNTER — APPOINTMENT (OUTPATIENT)
Dept: RADIATION ONCOLOGY | Age: 58
End: 2023-07-26
Payer: COMMERCIAL

## 2023-07-26 ENCOUNTER — HOSPITAL ENCOUNTER (OUTPATIENT)
Dept: RADIATION ONCOLOGY | Age: 58
Discharge: HOME | End: 2023-07-26
Payer: COMMERCIAL

## 2023-07-26 ENCOUNTER — HOSPITAL ENCOUNTER (OUTPATIENT)
Dept: INFUSION THERAPY | Age: 58
Discharge: HOME | End: 2023-07-26
Payer: COMMERCIAL

## 2023-07-26 DIAGNOSIS — C34.11 MALIGNANT NEOPLASM OF UPPER LOBE OF RIGHT LUNG (HCC): ICD-10-CM

## 2023-07-26 PROBLEM — J44.0 CHRONIC OBSTRUCTIVE PULMONARY DISEASE WITH ACUTE LOWER RESPIRATORY INFECTION (HCC): Status: ACTIVE | Noted: 2023-01-01

## 2023-07-26 LAB
ALBUMIN SERPL-MCNC: 3.1 GM/DL (ref 3.4–5)
ALP BLD-CCNC: 115 IU/L (ref 40–129)
ALT SERPL-CCNC: 12 U/L (ref 10–40)
ANION GAP SERPL CALCULATED.3IONS-SCNC: 16 MMOL/L (ref 4–16)
AST SERPL-CCNC: 13 IU/L (ref 15–37)
BASOPHILS ABSOLUTE: 0 K/CU MM
BASOPHILS RELATIVE PERCENT: 0.1 % (ref 0–1)
BILIRUB SERPL-MCNC: 0.5 MG/DL (ref 0–1)
BUN SERPL-MCNC: 11 MG/DL (ref 6–23)
CALCIUM SERPL-MCNC: 10.7 MG/DL (ref 8.3–10.6)
CHLORIDE BLD-SCNC: 93 MMOL/L (ref 99–110)
CO2: 22 MMOL/L (ref 21–32)
CREAT SERPL-MCNC: 0.9 MG/DL (ref 0.9–1.3)
DIFFERENTIAL TYPE: ABNORMAL
EOSINOPHILS ABSOLUTE: 0.1 K/CU MM
EOSINOPHILS RELATIVE PERCENT: 0.3 % (ref 0–3)
GFR SERPL CREATININE-BSD FRML MDRD: >60 ML/MIN/1.73M2
GLUCOSE SERPL-MCNC: 141 MG/DL (ref 70–99)
HCT VFR BLD CALC: 27.4 % (ref 42–52)
HEMOGLOBIN: 8.2 GM/DL (ref 13.5–18)
LYMPHOCYTES ABSOLUTE: 1.4 K/CU MM
LYMPHOCYTES RELATIVE PERCENT: 4.8 % (ref 24–44)
MCH RBC QN AUTO: 25.9 PG (ref 27–31)
MCHC RBC AUTO-ENTMCNC: 29.9 % (ref 32–36)
MCV RBC AUTO: 86.4 FL (ref 78–100)
MONOCYTES ABSOLUTE: 1.2 K/CU MM
MONOCYTES RELATIVE PERCENT: 3.9 % (ref 0–4)
PDW BLD-RTO: 14.9 % (ref 11.7–14.9)
PLATELET # BLD: 453 K/CU MM (ref 140–440)
PMV BLD AUTO: 8.6 FL (ref 7.5–11.1)
POTASSIUM SERPL-SCNC: 3.1 MMOL/L (ref 3.5–5.1)
RBC # BLD: 3.17 M/CU MM (ref 4.6–6.2)
SEGMENTED NEUTROPHILS ABSOLUTE COUNT: 27.3 K/CU MM
SEGMENTED NEUTROPHILS RELATIVE PERCENT: 90.9 % (ref 36–66)
SODIUM BLD-SCNC: 131 MMOL/L (ref 135–145)
TOTAL PROTEIN: 6.4 GM/DL (ref 6.4–8.2)
WBC # BLD: 30 K/CU MM (ref 4–10.5)

## 2023-07-26 PROCEDURE — 77386 HC NTSTY MODUL RAD TX DLVR CPLX: CPT | Performed by: RADIOLOGY

## 2023-07-26 PROCEDURE — 80053 COMPREHEN METABOLIC PANEL: CPT

## 2023-07-26 PROCEDURE — 36415 COLL VENOUS BLD VENIPUNCTURE: CPT

## 2023-07-26 PROCEDURE — 77336 RADIATION PHYSICS CONSULT: CPT | Performed by: RADIOLOGY

## 2023-07-26 PROCEDURE — 85025 COMPLETE CBC W/AUTO DIFF WBC: CPT

## 2023-07-27 ENCOUNTER — HOSPITAL ENCOUNTER (OUTPATIENT)
Dept: RADIATION ONCOLOGY | Age: 58
Discharge: HOME | End: 2023-07-27
Payer: COMMERCIAL

## 2023-07-27 ENCOUNTER — APPOINTMENT (OUTPATIENT)
Dept: RADIATION ONCOLOGY | Age: 58
End: 2023-07-27
Payer: COMMERCIAL

## 2023-07-27 PROCEDURE — 77386 HC NTSTY MODUL RAD TX DLVR CPLX: CPT | Performed by: RADIOLOGY

## 2023-07-28 ENCOUNTER — HOSPITAL ENCOUNTER (OUTPATIENT)
Dept: RADIATION ONCOLOGY | Age: 58
End: 2023-07-28
Payer: COMMERCIAL

## 2023-07-28 ENCOUNTER — APPOINTMENT (OUTPATIENT)
Dept: RADIATION ONCOLOGY | Age: 58
End: 2023-07-28
Payer: COMMERCIAL

## 2023-07-28 ENCOUNTER — CLINICAL DOCUMENTATION (OUTPATIENT)
Dept: ONCOLOGY | Age: 58
End: 2023-07-28

## 2023-07-28 ENCOUNTER — HOSPITAL ENCOUNTER (OUTPATIENT)
Dept: INFUSION THERAPY | Age: 58
Discharge: HOME | End: 2023-07-28
Payer: COMMERCIAL

## 2023-07-28 DIAGNOSIS — C34.11 MALIGNANT NEOPLASM OF UPPER LOBE OF RIGHT LUNG (HCC): Primary | ICD-10-CM

## 2023-07-28 PROCEDURE — 96366 THER/PROPH/DIAG IV INF ADDON: CPT

## 2023-07-28 PROCEDURE — 96417 CHEMO IV INFUS EACH ADDL SEQ: CPT

## 2023-07-28 PROCEDURE — 96375 TX/PRO/DX INJ NEW DRUG ADDON: CPT

## 2023-07-28 PROCEDURE — 2500000003 HC RX 250 WO HCPCS: Performed by: INTERNAL MEDICINE

## 2023-07-28 PROCEDURE — 96367 TX/PROPH/DG ADDL SEQ IV INF: CPT

## 2023-07-28 PROCEDURE — 96413 CHEMO IV INFUSION 1 HR: CPT

## 2023-07-28 PROCEDURE — 2580000003 HC RX 258: Performed by: INTERNAL MEDICINE

## 2023-07-28 PROCEDURE — 96360 HYDRATION IV INFUSION INIT: CPT

## 2023-07-28 PROCEDURE — 6360000002 HC RX W HCPCS: Performed by: INTERNAL MEDICINE

## 2023-07-28 RX ORDER — SODIUM CHLORIDE 9 MG/ML
5-250 INJECTION, SOLUTION INTRAVENOUS PRN
Status: DISCONTINUED | OUTPATIENT
Start: 2023-07-28 | End: 2023-07-29 | Stop reason: HOSPADM

## 2023-07-28 RX ORDER — SODIUM CHLORIDE 0.9 % (FLUSH) 0.9 %
5-40 SYRINGE (ML) INJECTION PRN
Status: DISCONTINUED | OUTPATIENT
Start: 2023-07-28 | End: 2023-07-29 | Stop reason: HOSPADM

## 2023-07-28 RX ORDER — PALONOSETRON 0.05 MG/ML
0.25 INJECTION, SOLUTION INTRAVENOUS ONCE
Status: COMPLETED | OUTPATIENT
Start: 2023-07-28 | End: 2023-07-28

## 2023-07-28 RX ORDER — DIPHENHYDRAMINE HYDROCHLORIDE 50 MG/ML
50 INJECTION INTRAMUSCULAR; INTRAVENOUS ONCE
Status: COMPLETED | OUTPATIENT
Start: 2023-07-28 | End: 2023-07-28

## 2023-07-28 RX ORDER — FAMOTIDINE 10 MG/ML
20 INJECTION, SOLUTION INTRAVENOUS ONCE
Status: COMPLETED | OUTPATIENT
Start: 2023-07-28 | End: 2023-07-28

## 2023-07-28 RX ADMIN — DEXAMETHASONE SODIUM PHOSPHATE 12 MG: 4 INJECTION, SOLUTION INTRAMUSCULAR; INTRAVENOUS at 10:39

## 2023-07-28 RX ADMIN — PALONOSETRON 0.25 MG: 0.25 INJECTION, SOLUTION INTRAVENOUS at 10:37

## 2023-07-28 RX ADMIN — DIPHENHYDRAMINE HYDROCHLORIDE 50 MG: 50 INJECTION INTRAMUSCULAR; INTRAVENOUS at 10:37

## 2023-07-28 RX ADMIN — PACLITAXEL 66 MG: 6 INJECTION, SOLUTION INTRAVENOUS at 11:04

## 2023-07-28 RX ADMIN — SODIUM CHLORIDE, PRESERVATIVE FREE 10 ML: 5 INJECTION INTRAVENOUS at 15:09

## 2023-07-28 RX ADMIN — FAMOTIDINE 20 MG: 10 INJECTION, SOLUTION INTRAVENOUS at 10:37

## 2023-07-28 RX ADMIN — DEXTROSE MONOHYDRATE 500 ML: 50 INJECTION, SOLUTION INTRAVENOUS at 09:08

## 2023-07-28 RX ADMIN — CARBOPLATIN 162 MG: 10 INJECTION, SOLUTION INTRAVENOUS at 12:27

## 2023-07-28 RX ADMIN — POTASSIUM CHLORIDE 20 MEQ: 2 INJECTION, SOLUTION, CONCENTRATE INTRAVENOUS at 13:04

## 2023-07-28 RX ADMIN — SODIUM CHLORIDE 20 ML/HR: 9 INJECTION, SOLUTION INTRAVENOUS at 10:36

## 2023-07-28 NOTE — PROGRESS NOTES
Patient arrived  to infusion area today, for PIV access, pre medication and chemotherapy infusion. PIV placed without difficulty. Positive blood return noted. Labs reviewed from 7/26/23. Assessment complete. Pt arrived to treatment suite at 88% O2 on room air. Placed on concentrator. Pt states he has not had a bowel movement in two weeks, but also states he has not been eating. Abdomen soft, BS auscultated in all four quadrants. Pt did use Ensure samples  that were provided at last visit. More samples to be given to patient prior to discharge today. Weight at 96lbs, unchanged from last visit. Pain continues to right side at 10/10 without pain medication. Pt forgot to take this morning. Requests that radiation treatment be held for today due to not having pain medication on board. Dr. Peace Barrera assessed patient prior to treatment. Plan to obtain oxygen for home. Medication to be called in to help with bowel movement and also with difficulty sleeping. Pt to receive 500 cc fluid bolus of D5 as well as 20meq KCL per Dr. Peace Barrera. Treatment authorized and administered as ordered. AVS provided. Pt. tolerated well and left treatment area via wheelchair in stable condition.   Black Rodriges, RN

## 2023-07-28 NOTE — PROGRESS NOTES
Home O2 orders, Oximetry test results and Dr. Kenneth Carreno most recent note faxed to [facility], confirmation fax received.

## 2023-07-30 PROBLEM — J96.20 ACUTE ON CHRONIC RESPIRATORY FAILURE (HCC): Status: ACTIVE | Noted: 2023-01-01

## 2023-07-30 NOTE — PROGRESS NOTES
Pt placed on bipap 12/5 90%. O2 Sat is staying at 97%. HR in the 150-160's. Dr. Ibrahim Province to take off bipap and place on vapotherm. Placed on 40L 75%. 94% 167 HR.

## 2023-07-30 NOTE — ED TRIAGE NOTES
Pt arrives via EMS for AMS. Pt has been lethargic for last day. Pt O2 was in the 70s on EMS arrival. Pt arrived on NRB O2 sat 88%.      DR. Linh Best and resp at bedside on pt arrival

## 2023-07-30 NOTE — PROGRESS NOTES
07/30/23 1849   NIV Type   $NIV $Daily Charge   NIV Started/Stopped On   Mode Bilevel   Mask Type Full face mask   Mask Size Small   Assessment   Pulse (!) 169   Respirations (!) 32   SpO2 100 %   Settings/Measurements   PIP Observed 17 cm H20   IPAP 15 cmH20   CPAP/EPAP 5 cmH2O   Vt (Measured) 671 mL   Rate Ordered 32   Insp Rise Time (%) 3 %   O2 Flow Rate (L/min) 40 L/min   FiO2  75 %   I Time/ I Time % 1 s   Minute Volume (L/min) 22.8 Liters   Mask Leak (lpm) 32 lpm

## 2023-07-30 NOTE — ED PROVIDER NOTES
Tomer 46 38 - 52 mmHG    pO2, Tomer 23 (L) 28 - 48 mmHG    Base Exc, Mixed 4.5 (H) 0 - 1.2    HCO3, Venous 29.8 (H) 19 - 25 MMOL/L    O2 Sat, Tomer 29.7 (L) 50 - 70 %    Comment VBG    Brain Natriuretic Peptide   Result Value Ref Range    Pro-BNP 2,119 (H) <300 PG/ML   D-Dimer, Rapid   Result Value Ref Range    D-Dimer, Quant 1.25 (H) <0.47 ug/mL (FEU)   POCT Glucose   Result Value Ref Range    POC Glucose 91 70 - 99 MG/DL   EKG 12 Lead   Result Value Ref Range    Ventricular Rate 105 BPM    Atrial Rate 105 BPM    P-R Interval 128 ms    QRS Duration 88 ms    Q-T Interval 338 ms    QTc Calculation (Bazett) 446 ms    P Axis 71 degrees    R Axis 75 degrees    T Axis 65 degrees    Diagnosis       Sinus tachycardia with premature atrial complexes  Nonspecific ST abnormality  Abnormal ECG  When compared with ECG of 30-JUL-2023 18:28,  premature atrial complexes are now present  ST no longer depressed in Inferior leads  ST less depressed in Anterior leads  T wave inversion no longer evident in Inferior leads  T wave inversion no longer evident in Anterolateral leads        Radiographs (if obtained):  Radiologist's Report Reviewed:  No results found. MDM:  Assessment: 55-year-old male with past medical history of lung cancer and recent admission for pneumonia presents with respiratory distress, A-fib with RVR, hypoxia    Ddx including but not limited to: Sepsis, PE, pneumonia, ACS, electrolyte abnormality    ED Course as of 07/31/23 0007   Sun Jul 30, 2023   1858 WBC(!): 28.0  Improvement from 4 days ago [AR]   1859 XR CHEST PORTABLE  IMPRESSION:  1. Persistent dense opacity central upper right lung keeping with mass,  infectious process or combination of the two. Correlate with prior biopsy  results. 2. Possible right hilar adenopathy. 3.  Pulmonary sequela typical of that seen with smoking, including COPD.   4. No pleural effusion or pneumothorax evident. [AR]   1909 Lactic Acid, Sepsis(!!): 3.4 [AR]   1909 D-Dimer, injection 10 mg (has no administration in time range)   sodium chloride flush 0.9 % injection 5-40 mL (has no administration in time range)   lactated ringers bolus bolus 500 mL (0 mLs IntraVENous Stopped 7/30/23 1927)   ceFEPIme (MAXIPIME) 2,000 mg in sodium chloride 0.9 % 50 mL IVPB (mini-bag) (0 mg IntraVENous Stopped 7/30/23 2134)   vancomycin (VANCOCIN) 1,250 mg in sodium chloride 0.9 % 250 mL IVPB ([id number]) (0 mg IntraVENous Stopped 7/30/23 2134)   iopamidol (ISOVUE-370) 76 % injection 50 mL (50 mLs IntraVENous Given 7/30/23 1928)   iopamidol (ISOVUE-370) 76 % injection 75 mL (75 mLs IntraVENous Given 7/30/23 2352)       Independent Imaging Interpretation by me: CXR, CTA    EKG (if obtained): (All EKG's are interpreted by myself in the absence of a cardiologist)  12 lead EKG per my interpretation:  A-fib with RVR with diffuse ST depressions in lateral leads, no ST elevations appreciated  Axis is   Normal  QTc is   prolonged  There is specific T wave changes appreciated. There is no specific ST wave changes appreciated. Prior EKG to compare with was available showing sinus tachycardia with ST depressions      Chronic conditions affecting care: Lung cancer with metastasis    Discussion with Other Profesionals : Admitting Team      Social Determinants : None    Records Reviewed : Inpatient Notes last discharge note    Total critical care time today provided was 62 minutes. This excludes seperately billable procedure. Critical care time provided for that required close evaluation and/or intervention with concern for patient decompensation. Disposition Considerations (tests considered but not done, Shared Decision Making, Pt Expectation of Test or Tx.): Admit      I am the Primary Clinician of Record. Clinical Impression:  1. Pneumonia of right lung due to infectious organism, unspecified part of lung    2. New onset a-fib ([address])    3. Hypoxia    4.  Lung mass      Disposition referral (if

## 2023-07-31 ENCOUNTER — HOSPITAL ENCOUNTER (OUTPATIENT)
Dept: RADIATION ONCOLOGY | Age: 58
Discharge: HOME | End: 2023-07-31
Payer: COMMERCIAL

## 2023-07-31 ENCOUNTER — APPOINTMENT (OUTPATIENT)
Dept: RADIATION ONCOLOGY | Age: 58
End: 2023-07-31
Payer: COMMERCIAL

## 2023-07-31 PROBLEM — I63.9 STROKE DETERMINED BY CLINICAL ASSESSMENT (HCC): Status: ACTIVE | Noted: 2023-01-01

## 2023-07-31 PROBLEM — E43 SEVERE MALNUTRITION (HCC): Status: ACTIVE | Noted: 2023-01-01

## 2023-07-31 PROBLEM — I66.01 OCCLUSION AND STENOSIS OF RIGHT MIDDLE CEREBRAL ARTERY: Status: ACTIVE | Noted: 2023-07-31

## 2023-07-31 PROBLEM — I48.91 NEW ONSET A-FIB (HCC): Status: ACTIVE | Noted: 2023-07-31

## 2023-07-31 NOTE — CARE COORDINATION
Attempted visit. Patient is not able to participate- confused and drowsy. No family present. Will reach out to family for updated information.  Jose Luis Cortez RN

## 2023-07-31 NOTE — CONSULTS
Final Result   1. Persistent dense opacity central upper right lung keeping with mass,   infectious process or combination of the two. Correlate with prior biopsy   results. 2. Possible right hilar adenopathy. 3.  Pulmonary sequela typical of that seen with smoking, including COPD. 4. No pleural effusion or pneumothorax evident. MRI BRAIN W WO CONTRAST    (Results Pending)     Assessment/Plan:    Squamous Cell Lung Cancer  -no known metastatic disease, L sided area of concern in lung with mild uptake on previous PET and less likely metastatic. No known distant disease. Previous MRI Head 6/8/2023 was negative for metastatic disease although initiation of treatment was delayed by patient. He did recently initiate chemoradiation with first radiation on 7/20 and C2D1 Carbo/Taxol on 7/21. It has been <2 weeks since initiation of treatment and progression seen on CT does not represent failure of treatment. -CT Head concerning for watershed infarct vs metastasis. MRI Brain pending. Prognostication from a malignancy perspective highly dependent on results of this. With known information he has likely controllable disease and prognosis would be more dependent on recovery from acute event. 2. Anemia  Know anemia of chronic diease, expect some decline with treatment. Continue to trend. Transfusion support for Hgb <7.    3. Neurology following for CVA. He notes he has not been compliant with aspirin for past CVA. Rest of care per primary team.     4. Post obstructive PNA. Pulmonologist on board. I will try to meet sister in AM.    I have independently evaluated and examined this patient today. I have reviewed radiologic and biochemical tests on this patient. Management Plan is developed mutually with ELMO Miles. I have reviewed above note and agree with assessment and plan    Assessment and plan of care was developed in coordination with attending Dr. Misti Camara.     [address], [place]    Thank you for allowing me to participate in the care of this very pleasant patient. Labs, rationale, and plan of care all discussed in depth with patient and questions and concerns addressed.

## 2023-07-31 NOTE — PROGRESS NOTES
aggressive bolus and D50 pushes. No change in pt's mental status or symptoms. Unsure of accuracy. Will monitor closely, check BMP to confirm. R. Lung SCC  Clinically stage T3 NSCLC  Undergoing chemotherapy- Carboplatin and Taxol with RT   With new findings of CVA, expected decrease in functional status, worsening primary cancer- consulted Oncology for further prognostication, await MRI brain W/WO contrast.      Personally reviewed Lab Studies and Imaging     Discussed management of the case with neurology who recommended to await for MRI before further management      Drugs that require monitoring for toxicity include Zosyn and the method of monitoring was renal      Diet Diet NPO   DVT Prophylaxis [] Lovenox, []  Heparin, [x] SCDs, [] Ambulation,  [] Eliquis, [] Xarelto  [] Coumadin   Code Status Full Code   Disposition From: home  Expected Disposition: TBD  Estimated Date of Discharge: 2-3 days  Patient requires continued admission due to acute hypoxic resp failure on vapotherm, acute CVA   Surrogate Decision Maker/ POA      Review of Systems:    Review of Systems    See above    Objective: Intake/Output Summary (Last 24 hours) at 7/31/2023 1031  Last data filed at 7/31/2023 0407  Gross per 24 hour   Intake 925.78 ml   Output --   Net 925.78 ml        Vitals:   Vitals:    07/31/23 0932   BP:    Pulse: 83   Resp: 24   Temp:    SpO2: 100%       Physical Exam:     General: NAD  Eyes: EOMI  ENT: neck supple  Cardiovascular: Regular rate. Respiratory: decreased breathing sounds  Gastrointestinal: Soft, non tender  Genitourinary: no suprapubic tenderness  Musculoskeletal: No edema  Skin: warm, dry  Neuro: Alert. Psych: Mood appropriate.      Medications:   Medications:    aspirin  81 mg Oral Daily    Or    aspirin  300 mg Rectal Daily    potassium chloride  10 mEq IntraVENous Q1H    magnesium sulfate  2,000 mg IntraVENous Once    linezolid  600 mg IntraVENous Q12H    dextrose  50 g IntraVENous Once Tissues/Bones: Again, bony destruction of the right upper ribs. No other bone lesions are identified. No evidence of pulmonary embolism. Large right upper lobe mass which has increased in size when compared to the previous exam from June, with progressive chest invasion and bony erosion. Probable metastatic disease in the left lung which is similar to perhaps slightly increased when compared to the previous exam. There is a new infiltrate along the inferior margin of the mass, which likely reflects postobstructive pneumonitis. There is bronchial wall thickening seen bilaterally. Filling defects are seen in the left lower lobe airways. In addition, there are numerous punctate tree-in-bud centrilobular nodules within the left lower lobe, which likely reflects bronchiolitis versus aspiration. Debris layering within the trachea which may reflect mucus versus aspirated material.     CTA HEAD NECK W CONTRAST    Result Date: 7/31/2023  EXAMINATION: CTA OF THE HEAD AND NECK WITH CONTRAST 7/30/2023 11:22 pm: TECHNIQUE: CTA of the head and neck was performed with the administration of intravenous contrast. Multiplanar reformatted images are provided for review. MIP images are provided for review. Stenosis of the internal carotid arteries measured using NASCET criteria. Automated exposure control, iterative reconstruction, and/or weight based adjustment of the mA/kV was utilized to reduce the radiation dose to as low as reasonably achievable. COMPARISON: Brain MRI 06/08/2023. PET CT 04/27/2023. CTA head and neck 06/23/2022. Carotid ultrasound 06/25/2022 HISTORY: ORDERING SYSTEM PROVIDED HISTORY: acute CVA TECHNOLOGIST PROVIDED HISTORY: Reason for exam:->acute CVA Has a \"code stroke\" or \"stroke alert\" been called? ->Yes Reason for Exam: acute CVA Relevant Medical/Surgical History: isovue 370 75ml FINDINGS: CTA NECK: AORTIC ARCH/ARCH VESSELS: No dissection or arterial injury.   No significant stenosis of the

## 2023-07-31 NOTE — CONSULTS
Vascular/Interventional Neurology Service Consult Note  [address] [place] [address]   Patient Name: Teresita Ward  : 1965        Subjective:   Reason for consult: Micah Leearabella thomas. o.male with a pmh of HTN, HLD, COPD, chronic hep C, stroke, squamous cell lung carcinoma with metastases, SEAN, sepsis, and TB presenting to [address] [place] [address] with respiratory failure with O2 sat's in the 70%,  A-fib with RVR, and stroke-like symptoms. The patient has a history of squamous cell lung carcinoma. The patient has been aware of lung nodules since 2022 but unfortunately did not follow up for biopsy. He presented to the ED on 23 with SOB. He was found to have pneumonia, severe sepsis secondary to post obstructive pneumonia in the setting of right upper lobe mass. A lung biopsy confirmed moderately differentiated small cell carcinoma. He was to follow up with Oncology as an outpatient for radiation. A stroke alert was called in the ED due to left-sided weakness and garbled speech. He was evaluated by [facility] stroke team but was not a candidate for t-NK. CT of the head with  Loss of the gray-white matter junction in the right middle cerebral artery distribution with areas of general edema, sulcal effacement, and hyperdensity of the right middle cerebral artery. Features highly suspicious for right middle cerebral artery infarct with underlying thrombosis of the middle cerebral artery. Does involve the basal ganglia including the caudate and lenticular nucleus. There is also a more localized area of low attenuation some heterogenous density of the parenchyma at the right parietal and occipital lobe. Possible underlying metastasis or watershed infarct. No apparent mass/vasogenic edema or sulcal effacement of the left cerebral hemisphere. An MRI w/wo contrast has been ordered to exclude underlying metastases. CTA demonstrated Right M1 MCA occlusion, new.   Right MCA distribution

## 2023-07-31 NOTE — PROGRESS NOTES
Physical Therapy  Attempted to see pt for PT/OT bandar. RN requesting therapy hold at this time. Pt has multiple medical issues that are trying to be addressed and low arousal. Will continue to monitor chart and see once medically stable.

## 2023-07-31 NOTE — PROGRESS NOTES
Comprehensive Nutrition Assessment    Type and Reason for Visit:  Initial (low BMI)    Nutrition Recommendations/Plan:   Initiate oral diet as medically able, recommend 5 CHO choices  Offer oral supplements when diet is advanced  If unable to initiate oral diet within 48 hours, may consider nutrition support, as patient is malnourished, undergoing cancer tx  Monitor weight, po intakes/diet advancement, labs, POC     Malnutrition Assessment:  Malnutrition Status:  Severe malnutrition (07/31/23 1316)    Context:  Chronic Illness     Findings of the 6 clinical characteristics of malnutrition:  Energy Intake:  Mild decrease in energy intake (Comment) (suspected)  Weight Loss:  Greater than 20% over 1 year     Body Fat Loss:  Mild body fat loss Buccal region   Muscle Mass Loss:  Severe muscle mass loss Clavicles (pectoralis & deltoids), Temples (temporalis)  Fluid Accumulation:  Unable to assess     Strength:  Not Performed    Nutrition Assessment:    Admitted w/ acute on chronic respiratory failure, past medical history of  hypertension, hyperlipidemia, Ischemic CVA, COPD/Emphysema, R. Lung SCC, chronic hepatitis C. Pt sleeping soundly at attempted visits, did not wake to knock/name; not appropriate for interview at this time per RN. Pt remains NPO at this time. Noted significant wt loss of 36% x 1yr, 22.6% x3mo. Noted pt not talking, talking anything po for the past 4 days. Pt w/ visible muscle wasting, meets criteria for malnutrition. Left Maximizing Nutrition During Cancer Treatment handout and contact info at bedside. Follow at high nutrition risk.     Nutrition Related Findings:    +lactated ringers; glucose , hgbA1C 5.4%, Na 134, K 3.4, lactic acid 3.4 Wound Type: None       Current Nutrition Intake & Therapies:    Average Meal Intake: NPO  Average Supplements Intake: NPO  Diet NPO    Anthropometric Measures:  Height: 5' 9\" (175.3 cm)  Ideal Body Weight (IBW): 160 lbs (73 kg)    Admission Body Weight: 99

## 2023-07-31 NOTE — PROGRESS NOTES
SLP ALL NOTES      Attempted to see pt this am for swallowing evaluation. Deferred by RN due to pt hypoglycemic. Nsg will contact SLP when pt is appropriate for participation.     Delfino Goetz MA [facility] SLP  Speech Language Pathologist

## 2023-07-31 NOTE — CONSULTS
Neurology Service Consult Note  [address] [place] [address]   Patient Name: Poncho Vickers  : 1965        Subjective:   Reason for consult: Stroke like symptoms  63 y.o.  male with history of TB, HTN, metastatic lung cancer presenting to [address] [place] [address] with respiratory distress, hypoxemia, AMS. EMS reported sats initially at 70%. Patient is currently undergoing chemotherapy for lung cancer, last treatment was 23. Patient had improved alertness when arriving to floor. He was able to move all extremities with left sided weakness noted and garbled speech. Stroke alert was called and patient was discussed with [facility] stroke team, thrombolytic was not recommended. Patient is known to our service. He is on Plavix and statin at home, started 2022 hospitalization for left sided numbness and weakness. He was noted to have lacunar infarction in the right precentral gyrus. Chart was reviewed in detail, patient was seen and assessed. He is lying in bed, opens eyes to voice. He has difficult time participating in exam but attempts. Noted to have left hemiparesis, right gaze preference. Feel possible patient possibly has a visual field cut/left homonomous hemianopsia. His is thin and ill appearing.  No family at bedside at time of exam.     Past Medical History:   Diagnosis Date    Hypertension     TB (pulmonary tuberculosis)     :   Past Surgical History:   Procedure Laterality Date    CT NEEDLE BIOPSY LUNG PERCUTANEOUS  2023    CT NEEDLE BIOPSY LUNG PERCUTANEOUS 2023 [address] CT SCAN     Medications:  Scheduled Meds:   aspirin  81 mg Oral Daily    Or    aspirin  300 mg Rectal Daily    potassium chloride  10 mEq IntraVENous Q1H    magnesium sulfate  2,000 mg IntraVENous Once    linezolid  600 mg IntraVENous Q12H    piperacillin-tazobactam  3,375 mg IntraVENous Q8H    rosuvastatin  40 mg Oral Nightly     Continuous Infusions:   dextrose      IV infusion builder      sodium chloride Pulse Readings from Last 3 Encounters:   07/31/23 77   07/21/23 92   07/20/23 87        Gen: Drowsy and oriented to self only, NAD, cooperative  HEENT: NC/AT, EOMI, PERRL, mmm, neck supple, no meningeal signs  Heart: NSR  Lungs: Increased WOB, congested cough on heated high flow  Ext: no edema, no calf tenderness b/l  Psych: normal mood and flat depressed affect  Skin: no rashes or lesions    NEUROLOGIC EXAM:    Mental Status: Drowsy and oriented to self only, confused to location, month and year, NAD, speech slurred, language slow, repetition and naming not intact, follows commands appropriately    Cranial Nerve Exam:   CN II-XII:  PERRL, suspect left homonomous hemianopsia, no nystagmus, right gaze preference, sensation V1-V3 intact b/l, muscles of facial expression weak on left; hearing intact to conversational tone, palate elevates symmetrically, shoulder elevation symmetric and tongue protrudes midline with movement side to side. Motor Exam:       Strength 5/5 UE/LE right, 0/5 UE/LE left  Tone and bulk normal   No pronator drift    Deep Tendon Reflexes: 1/4 biceps, triceps, brachioradialis, patellar, and achilles b/l; flexor plantar responses b/l, upgoing toe on left    Sensation: Intact light touch/pinprick/vibration UE's/LE's b/l    Coordination/Cerebellum:       Tremors--none      Rapidly alternating movements:  dysdiadochokinesia left           Heel-to-Shin: dysmetria left      Finger-to-Nose:  dysmetria left    Gait and stance:      Gait: deferred      LABS:     Recent Labs     07/30/23  1829 07/31/23  0445   WBC 28.0* 25.1*    141   K 3.6 3.1*    106   CO2 22 25   BUN 15 13   CREATININE 0.8* 0.8*   GLUCOSE 121* 93       IMAGING:    CT head w/o contrast:  IMPRESSION:  1. Loss of the gray-white matter junction in the right middle cerebral  artery distribution with areas of general edema, sulcal effacement, and  hyperdensity of the right middle cerebral artery.   Features highly

## 2023-07-31 NOTE — PROGRESS NOTES
Called Keara and gave her a update on his care. She confirmed he is a full code. She also confirmed he have no metal in his body. When through the check list for mri. ... but waiting till the patient is more stable getting attempting the MRI. She is planning to come in tomorrow at 8 am to talk with the doctors. Patients looks gray and is hallucinating saying the tv is alive. Do you see that over there? There is a animal. .... Very confused.

## 2023-07-31 NOTE — PLAN OF CARE
Problem: Discharge Planning  Goal: Discharge to home or other facility with appropriate resources  Outcome: Progressing     Problem: Pain  Goal: Verbalizes/displays adequate comfort level or baseline comfort level  Outcome: Progressing     Problem: Pain  Goal: Verbalizes/displays adequate comfort level or baseline comfort level  Outcome: Progressing     Problem: Skin/Tissue Integrity  Goal: Absence of new skin breakdown  Description: 1. Monitor for areas of redness and/or skin breakdown  2. Assess vascular access sites hourly  3. Every 4-6 hours minimum:  Change oxygen saturation probe site  4. Every 4-6 hours:  If on nasal continuous positive airway pressure, respiratory therapy assess nares and determine need for appliance change or resting period.   Outcome: Progressing

## 2023-07-31 NOTE — PROGRESS NOTES
Patient arrived to unit from ER per stretcher. Patient alert and orient times 4. Able to move all extremities with weakness noted more to left side during care of patient. A full head to toe assessment not completed by this nurse at this time. Speech garbled. Bryan to room, call light, staff, lights, and tv and no questions at this time. Resp. Therapy on unit and connecting patient to vapotherm at this time.

## 2023-07-31 NOTE — PROGRESS NOTES
Dr Festus Hernandez on unit speaking with family and dr requested stroke alert to be called at this time last well known was 4 days ago. Patient alert and orient times 4. Blood glucose 91. ICU nurse, house supervisor, and rapid team personnel on unit. And patient transferred to CT for CTA and stat MRI.

## 2023-07-31 NOTE — H&P
PORTABLE    Result Date: 7/30/2023  EXAMINATION: ONE XRAY VIEW OF THE CHEST 7/30/2023 6:43 pm COMPARISON: Chest 06/17/2023, CT PA 06/07/2023 HISTORY: sepsis FINDINGS: *2 images are presented. *Persistent dense opacity central upper right lung keeping with mass, infectious process or combination of the two. Correlate with prior biopsy results. *Obscuration right hilum. *Pulmonary sequela typical of that seen with smoking, including COPD. *No pleural effusion or pneumothorax evident. *Cardiomediastinal and LEFT hilar silhouettes appear unremarkable. *No acute osseous abnormality. 1. Persistent dense opacity central upper right lung keeping with mass, infectious process or combination of the two. Correlate with prior biopsy results. 2. Possible right hilar adenopathy. 3.  Pulmonary sequela typical of that seen with smoking, including COPD. 4. No pleural effusion or pneumothorax evident. CTA PULMONARY W CONTRAST    Result Date: 7/30/2023  EXAMINATION: CTA OF THE CHEST, 7/30/2023 7:27 pm TECHNIQUE: CTA of the chest was performed after the administration of intravenous contrast.  Multiplanar reformatted images are provided for review. MIP images are provided for review. Automated exposure control, iterative reconstruction, and/or weight based adjustment of the mA/kV was utilized to reduce the radiation dose to as low as reasonably achievable. COMPARISON: 06/07/2023 HISTORY: ORDERING SYSTEM PROVIDED HISTORY:  PE? TECHNOLOGIST PROVIDED HISTORY: Reason for Exam:  PE? Decision Support Exception - unselect if not a suspected or confirmed emergency medical condition->Emergency Medical Condition (MA) Reason for Exam:  PE?. lung ca FINDINGS: Pulmonary Arteries: Pulmonary arteries are adequately opacified for evaluation. No evidence of intraluminal filling defect to suggest pulmonary embolism. Main pulmonary artery is normal in caliber. Mediastinum: Visualized thyroid unremarkable. No lymphadenopathy.   Esophagus reflects bronchiolitis versus aspiration.  Debris layering within the trachea which may reflect mucus versus aspirated material.         Electronically signed by Thu Turcios MD on 7/31/2023 at 12:52 AM

## 2023-08-01 NOTE — PROGRESS NOTES
Hematology/Oncology Progress Note    Patient Name:  Miguel Cota  Patient :  1965  Patient MRN:  [id number]     Primary Oncologist: Dr Cliff Alvarez  Referring Provider: No primary care provider on file. Date of Service: 2023      Reason for Consult: SCC lung, now with new CVA     Chief Complaint:    Chief Complaint   Patient presents with    Altered Mental Status     Principal Problem:    Acute on chronic respiratory failure ([address])  Active Problems:    Pneumonia of right lung due to infectious organism    Left-sided weakness    Severe malnutrition ([address])    Occlusion and stenosis of right middle cerebral artery    New onset a-fib ([address])    Stroke determined by clinical assessment ([address])  Resolved Problems:    * No resolved hospital problems. *    HPI:   Miguel Cota is a pleasant 63 y.o. male known to oncology in treatment for Squamous Cell Carcinoma with recent initiation of chemoradiation with radiation starting 2023 weekly Carbo/Taxol last receiving C2D1 2023. He presented to the ER with respiratory distress and AMS with oxygen saturations on the 70s on EMS arrival to home. Stroke alert was called with workup consistent for R M1 MCA occlusion, not a tPA candidate. CT was unable to r/o brain metastases, non-specific low attenuation could be watershed infarct vs metastasis. MRI is pending although was negative in early . CTA of the chest was significant for enlargement of RUL mass with progressive chest invasion and bony erosion. Also notes area of concern in left lung which did not seem metastatic on previous PET, ?larger than prior. Debris in trachea noted. He is on Zosyn for post-obstructive pneumonia. He is alert and oriented on exam today with L sided weakness. Answers briefly with no elaboration. Mouth dry. Fatigued, mostly wishes to rest on exam. Afebrile and HDS. Pt is aware about h.o CVA in . Not on ASA. Interval   Pt seen and examined.   He is now sedated and

## 2023-08-01 NOTE — PROGRESS NOTES
Chantal De Leon notified MRI can only take two infusions at a time. Pt on multiple continuous sedatives and pressors that cannot be turned off at this time. Okay to hold of on MRI until pt more stable per APRN. MRI screening form still needs completion. Pt unable to answer questions at this time.

## 2023-08-01 NOTE — CONSULTS
Comprehensive Nutrition Assessment    Type and Reason for Visit:  Reassess, Consult (TF order/management; vent)    Nutrition Recommendations/Plan:   Start TF- Vital AF 1.2 (peptide based formula) @ 45mL/hr continuous w/ 105mL free water flush Q6H to provide 1404kcal (including kcal from propofol), 81g PRO, and 1300mL fluids per day  Advance rate slowly, as patient at risk for refeeding syndrome  Monitor weight, glucose, lytes, GI status, HOB, POC     Malnutrition Assessment:  Malnutrition Status:  Severe malnutrition (07/31/23 1316)    Context:  Chronic Illness     Findings of the 6 clinical characteristics of malnutrition:  Energy Intake:  Mild decrease in energy intake (Comment) (suspected)  Weight Loss:  Greater than 20% over 1 year     Body Fat Loss:  Mild body fat loss Orbital, Buccal region   Muscle Mass Loss:  Severe muscle mass loss Clavicles (pectoralis & deltoids), Temples (temporalis), Thigh (quadraceps), Calf (gastrocnemius)  Fluid Accumulation:  No significant fluid accumulation     Strength:  Not Performed    Nutrition Assessment:    Pt intubated 7/31, +propofol, 1 pressor at this time. Consult for TF order and management, advance rate very slowly as pt at risk for refeeding syndrome (is malnourished w/ poor po intakes for at least 5 days). Continue to follow at high nutrition risk. Nutrition Related Findings:    +propofol, fentanyl, cathi, WBC 32.6, glucose 105-134, P 2.2, hgb 8.0 Wound Type: None       Current Nutrition Intake & Therapies:    Average Meal Intake: NPO  Average Supplements Intake: NPO  Diet NPO  Additional Calorie Sources:  108kcal from propofol @ 4.1ml/hr    Anthropometric Measures:  Height: 5' 9\" (175.3 cm)  Ideal Body Weight (IBW): 160 lbs (73 kg)    Admission Body Weight: 99 lb 3.3 oz (45 kg) (bedscale)  Current Body Weight: 110 lb 14.3 oz (50.3 kg),   IBW.  Weight Source: Bed Scale  Current BMI (kg/m2): 16.4  Usual Body Weight: 128 lb (58.1 kg) (4/12/23)  % Weight Change (Calculated): -22.5  Weight Adjustment For: No Adjustment                 BMI Categories: Underweight (BMI less than 18.5)    Estimated Daily Nutrient Needs:  Energy Requirements Based On: Kcal/kg  Weight Used for Energy Requirements: Current  Energy (kcal/day): 2928-6640 (25-30kcal/kg CBW)  Weight Used for Protein Requirements: Current  Protein (g/day):  (1.5-2.0g/kg CBW)  Method Used for Fluid Requirements: 1 ml/kcal  Fluid (ml/day): 1500    Nutrition Diagnosis:   Severe malnutrition, In context of chronic illness related to catabolic illness, inadequate protein-energy intake as evidenced by severe muscle loss, weight loss greater than or equal to 20% in 1 year, weight loss 7.5% in 3 months, BMI, Criteria as identified in malnutrition assessment  Inadequate oral intake related to impaired respiratory function as evidenced by intubation, NPO or clear liquid status due to medical condition    Nutrition Interventions:   Food and/or Nutrient Delivery: Continue NPO, Start Tube Feeding  Nutrition Education/Counseling: No recommendation at this time  Coordination of Nutrition Care: Continue to monitor while inpatient, Coordination of Care  Plan of Care discussed with: RN    Goals:     Goals: Initiate nutrition support, within 2 days, by next RD assessment       Nutrition Monitoring and Evaluation:   Behavioral-Environmental Outcomes: None Identified  Food/Nutrient Intake Outcomes: Enteral Nutrition Intake/Tolerance  Physical Signs/Symptoms Outcomes: Biochemical Data, Nutrition Focused Physical Findings, Weight, Meal Time Behavior, GI Status    Discharge Planning:     Too soon to determine     Justine Dey [address]: [id number]

## 2023-08-01 NOTE — PROGRESS NOTES
07/31/23 2312   Patient Observation   Pulse 62   Respirations 14   SpO2 100 %   Vent Information   Vent Mode AC/VC   Ventilator Settings   FiO2  100 %   Vt (Set, mL) 450 mL   Resp Rate (Set) 16 bmp   PEEP/CPAP (cmH2O) 12   Pressure Support (cm H2O) 0 cm H2O   Vent Patient Data (Readings)   Vt (Measured) 470 mL   Peak Inspiratory Pressure (cmH2O) 29 cmH2O   Rate Measured 17 br/min   Minute Volume (L/min) 7.79 Liters   Mean Airway Pressure (cmH2O) 18 cmH20   Plateau Pressure (cm H2O) 0 cm H2O   Driving Pressure -12   I:E Ratio 1:1.80   Backup Apnea On   Backup Rate 16 Breaths Per Minute   Backup Vt 450   Vent Alarm Settings   High Pressure (cmH2O) 55 cmH2O   Low Minute Volume (lpm) 2.5 L/min   High Minute Volume (lpm) 22 L/min   Low Exhaled Vt (ml) 250 mL   High Exhaled Vt (ml) 1000 mL   RR High (bpm) 40 br/min   Apnea (secs) 20 secs   Additional Respiratoray Assessments   Humidification Source HME   Ambu Bag With Mask At Bedside Yes   ETT    Placement Date/Time: 07/31/23 2200   Present on Admission/Arrival: No  Placed By: Licensed provider  Placement Verified By: Chest X-ray; Auscultation;Direct visualization  Preoxygenation: Yes  Technique: Video laryngoscopy  Airway Type: Cuffed  Airway . ..   $ Intubation Emergent  $ Yes   Secured At 23 cm   Measured From Lips   ETT [address] tube [address]   Site Assessment Dry   Cuff Pressure   (MOL)   Tie/Horne Changed Yes

## 2023-08-01 NOTE — SIGNIFICANT EVENT
I was notified that patient became hypoxic and was getting intubated. By the time I reached the room patient was intubated by critical care NP. Initially nursing supervisor Helen Yen called patient's sister and updated her regarding patient's clinical condition. We discussed with patient's sister over phone regarding CODE STATUS. She wanted the patient to be a limited code with no resuscitation, no defibrillation, continue ventilator for now. Patient's CODE STATUS was updated in EMR accordingly. Patient's care was taken over by critical care.

## 2023-08-01 NOTE — PROGRESS NOTES
SLP ALL NOTES        ST will discontinue order for swallowing assessment at this time due to decline in status, pt now intubated and on mechanical ventilation. Please re-order evaluation when pt is able to participate.       Yelena Rasmussen MA [facility] SLP  Speech Language Pathologist

## 2023-08-01 NOTE — PROGRESS NOTES
I was asked to evaluate patient tonight concerning pericardial effusion/tamponade      Overnight events reviewed,  Patient with history of lung mass, malignancy with pneumonia currently intubated due to acute respiratory failure with sepsis and elevated white cell count    Physical examination    Blood pressure 158/70 on 1 IV pressor  Heart rate 78 bpm regular sinus  Chest coarse breath sounds, intubated on mechanical ventilator  Heart S1-S2 muffled. Abdomen soft    Stat echocardiogram reviewed  Patient has loculated right-sided pericardial effusion significantly enlarged compared to prior echocardiogram, in pre-tamponade physiology    No diastolic right atrium or RV collapse noted, while IVC is collapsible. Clinically patient is not tachycardic, with blood pressure of 158/70 albiet on 1 pressor in the setting of sepsis with white cell count of 32,000    At this time will thread cautiously and conservatively. Repeat limited echocardiogram in the morning, will consider palliative pericardiocentesis if needed be or consider pericardial window. Case was discussed with ICU attending at night Dr. Edmond Green time is performed by myself in   35 minutes exclusive of separately billable procedures. This time includes bedside physical exams and repeat evaluation, close medical management, titration of  IV pressors drip, discussion with consultants, review of diagnostic testing results and monitoring for potential decompensation.

## 2023-08-01 NOTE — PROGRESS NOTES
Patient's next of kin, sister, Hansel Bravo, contacted and updated patient now intubated. All questions answered. Hansel Bravo states she understands the ventilator is breathing for him and doesn't want her brother to suffer. Hansel Bravo is very tearful, and after a long discussion with myself and Dr. Moses Britt decided she wants patient to be a [facility]. Dr. Ivory Moscoso to update orders.

## 2023-08-01 NOTE — CARE COORDINATION
Patient now intubated. Code status changed to  Limited Code -  Will follow for post extubation needs.  Barbara Kevin RN

## 2023-08-01 NOTE — PROGRESS NOTES
V2.0    [facility] Progress Note      Name:  Fariba Rodriguez /Age/Sex: 1965  (63 y.o. male)   MRN & CSN:  [id number] & [id number] Encounter Date/Time: 2023 1:05 PM EDT   Location:  -A PCP: No primary care provider on file. Attending:Francisco Javier Chris DO       Hospital Day: 3    Assessment and Recommendations   Fariba Rodriguez is a 63 y.o. male with pmh of  who presents with Acute on chronic respiratory failure ([address])      Plan:   Acute metabolic encephalopathy secondary to acute ischemic right MCA CVA:  -Patient alert and oriented but waxing and waning mental status on admission, currently intubated, has left-sided paraplegia, L WK 3-3.5 days, NIH on admission 11, CT head with loss of ray-white differentiation in R. MCA territory. CTA head and neck with Right M1 MCA occlusion, severe R ICA stenosis, unchanged moderate P2 PCA severe narrowing. CTPA showed possible R MCA thrombosis. Patient on ASA, statin, neurochecks every 2 hours, PT/OT, SLP, neuro on board. MRI pending    Acute hypoxemic respiratory failure likely secondary to worsening size and extension of the right upper lobe possible aspiration pneumonitis versus HCAP pneumonia:  -VBG on admission with pH 7.42/PCO2 46/bicarb 29 on Vapotherm  -CTA chest withincreased size of RUL mass, invading the chest wall and nearby rib, probable consolidation distally, debris in trachea.  Continue Zosyn, add linezolid until MRSA is ruled out given recent hospitalization and septic picture, follow up resp cx  -Patient currently intubated, management as per [facility]    Pericardial effusion:  -Likely pretamponade as per cardiology  -Cardiology following  -Stat TTE EF greater than 65%, loculated pericardial effusion near the right ventricle, moderately increased in size compared to the previous echo 2023  -TTE from  did show small pericardial effusion at the time but no tamponade, EF 55 to 60%    Suspected sepsis: SIRS 3/4, IV antibiotics as above,

## 2023-08-01 NOTE — PLAN OF CARE
Thank you for the referral.  Chart reviewed. Per the physical rehabilitation triage process, the PT referral was discontinued. Patient is intubated/sedated and not presently able to participate with physical therapy service.   Bipin Finley [id number]  11:15 AM 8/1/2023

## 2023-08-01 NOTE — PLAN OF CARE
Problem: Discharge Planning  Goal: Discharge to home or other facility with appropriate resources  Outcome: Progressing     Problem: Pain  Goal: Verbalizes/displays adequate comfort level or baseline comfort level  Outcome: Progressing     Problem: Skin/Tissue Integrity  Goal: Absence of new skin breakdown  Description: 1. Monitor for areas of redness and/or skin breakdown  2. Assess vascular access sites hourly  3. Every 4-6 hours minimum:  Change oxygen saturation probe site  4. Every 4-6 hours:  If on nasal continuous positive airway pressure, respiratory therapy assess nares and determine need for appliance change or resting period.   Outcome: Progressing     Problem: Safety - Adult  Goal: Free from fall injury  Outcome: Progressing     Problem: Nutrition Deficit:  Goal: Optimize nutritional status  8/1/2023 1446 by Sharad Ordoñez RD  Outcome: Progressing  Flowsheets (Taken 8/1/2023 1436)  Nutrient intake appropriate for improving, restoring, or maintaining nutritional needs:   Assess nutritional status and recommend course of action   Monitor oral intake, labs, and treatment plans   Recommend, monitor, and adjust tube feedings and TPN/PPN based on assessed needs

## 2023-08-02 NOTE — CONSULTS
CARDIOLOGY CONSULT NOTE   Reason for consultation: Pericardial effusion      Primary care physician: No primary care provider on file. Chief Complaints :  Chief Complaint   Patient presents with    Altered Mental Status        History of present illness: Samantha Pacheco is a 63 y. o.year old male who admitted with acute on chronic respiratory failure with acute ischemic right MCA CVA. He is currently intubated and sedated. I was asked to see him because of pericardial effusion noted on his echocardiogram.  As above patient is intubated and sedated and not able to give any meaningful history. Physical Examination:    Vitals:    08/02/23 1300   BP:    Pulse: 64   Resp: 17   Temp:    SpO2: 100%        General Appearance: Intubated and sedated    Respiratory: On mechanical ventilation   cardiovascular: S1, S2, no murmurs, gallops. JVD wnl  Abdomen /GI:   Soft, No tenderness   Genitourinary: No costovertebral angle tenderness   Musculoskeletal:  No edema, no tenderness,   Integument:  Well hydrated, no rash   Neurologic: Unable to assess      Medical decision making and Data review:    Lab Review   Recent Labs     08/02/23  0440   WBC 27.8*   HGB 7.5*   HCT 24.5*         Recent Labs     08/02/23  0440      K 4.2      CO2 20*   PHOS 2.4*   BUN 12   CREATININE 0.9     Recent Labs     07/31/23  0953   AST 12*   ALT 10   BILITOT 0.7   ALKPHOS 90     Recent Labs     07/30/23  [facility] <0.010       Recent Labs     07/30/23  1829   PROBNP 2,119*     Lab Results   Component Value Date    INR 1.31 06/08/2023    PROTIME 16.6 (H) 06/08/2023       EKG: (reviewed by myself): EKG from 7/31/2023 showed sinus tachycardia without any ischemic changes. ECHO:(reviewed by myself) his echocardiogram was reviewed as well. It did show small to moderate pericardial effusion which is anterior to the right ventricle.   There are no signs of pericardial tamponade on echocardiogram.    Chest Xray:(reviewed by

## 2023-08-02 NOTE — PROGRESS NOTES
Talked to nurse again today about getting pt mri today. Pt is still on too many drips to use mri pump.  Still unable to obtain screening from family at this time -Do not cancel order at this time per Doctor

## 2023-08-02 NOTE — PROGRESS NOTES
V2.0  [facility] Hospitalist Progress Note      Name:  Aneta Delgado /Age/Sex: 1965  (63 y.o. male)   MRN & CSN:  [id number] & [id number] Encounter Date/Time: 2023 9:17 AM EDT    Location:  -A PCP: No primary care provider on file. Hospital Day: 4    Assessment and Plan:   Aneta Delgado is a 63 y.o. male with pmh of advanced non-small cell lung cancer, COPD, right MCA stroke, HLD, who presents with Acute on chronic respiratory failure ([address])      Plan:  Acute on chronic hypoxic respiratory failure  Suspected pneumonia ( post obstructive)  versus aspiration pneumonitis  Septic shock  Worsening right upper lobe mass, hx of advanced right lung on small cell lung cancer  Chronic normocytic Anemia  Acute right MCA stroke with  left-sided weakness  Questionable seizure  HTN  HLD  Chronic hep C      Neuro:  patient is on mechanical ventilator, sedated, opens eyes to voice commands, weak, and following on the right upper and lower extremities, continue propofol and fentanyl for analgo-sedation cardio: On phenylephrine at 70 mics per hour, wean as tolerated to maintain MAP> 65 mmHg, echocardiogram on -EF 55 to 60%, mild loculated pericardial effusion noted near the right ventricle no tamponade physiology  Respiratory:  On mechanical ventilator, FiO2 40% PEEP-5, SAT SBT trials when feasible continue pulmonary toileting, DuoNebs, Peridex  GI-Pepcid for GI prophylaxis, can be started on tube feeds  : Mild metabolic acidosis-CO2 20, renal function-WNL, replace electrolytes as needed, monitor urine output  Heme: WBC count-trending down 32 K--> 27.8 K, chronic anemia-Hb 7.5 today, platelets-WNL  ID: White count-trending down, blood cultures, MRSA nares-negative, continue empiric antibiotic coverage with Zosyn and linezolid for possible pneumonia  MSK: Left-sided weakness  Endo: Maintain euglycemia, not a known diabetic/hypothyroid    Diet Diet NPO  ADULT TUBE FEEDING; Orogastric; Peptide Based; NEGATIVE MG/DL    Ketones, Urine NEGATIVE NEGATIVE MG/DL    Specific Gravity, UA 1.010 1.001 - 1.035    Blood, Urine SMALL NUMBER OR AMOUNT OBSERVED (A) NEGATIVE    pH, Urine 6.5 5.0 - 8.0    Protein,  (A) NEGATIVE MG/DL    Urobilinogen, Urine 4.0 (H) 0.2 - 1.0 MG/DL    Nitrite Urine, Quantitative NEGATIVE NEGATIVE    Leukocyte Esterase, Urine NEGATIVE NEGATIVE   Urine Microscopic with Reflex to Culture    Collection Time: 08/01/23  1:26 PM   Result Value Ref Range    RBC, UA 4 (H) 0 - 3 /HPF    WBC, UA 8 (H) 0 - 2 /HPF    Bacteria, UA OCCASIONAL (A) NEGATIVE /HPF    Squam Epithel, UA <1 /HPF    Trans Epithel, UA <1 /HPF    Trichomonas NONE SEEN NONE SEEN /HPF    Amorphous, UA RARE /HPF   POCT Glucose    Collection Time: 08/01/23  3:16 PM   Result Value Ref Range    POC Glucose 91 70 - 99 MG/DL   Lactic Acid    Collection Time: 08/01/23  3:43 PM   Result Value Ref Range    Lactate 2.7 (HH) 0.5 - 1.9 mMOL/L   POCT Glucose    Collection Time: 08/01/23  4:53 PM   Result Value Ref Range    POC Glucose 92 70 - 99 MG/DL   Lactic Acid    Collection Time: 08/01/23  6:17 PM   Result Value Ref Range    Lactate 2.1 (HH) 0.5 - 1.9 mMOL/L   POCT Glucose    Collection Time: 08/01/23 10:44 PM   Result Value Ref Range    POC Glucose 120 (H) 70 - 99 MG/DL   POCT Glucose    Collection Time: 08/02/23  2:45 AM   Result Value Ref Range    POC Glucose 103 (H) 70 - 99 MG/DL   Renal Function Panel    Collection Time: 08/02/23  4:40 AM   Result Value Ref Range    Sodium 138 135 - 145 MMOL/L    Potassium 4.2 3.5 - 5.1 MMOL/L    Chloride 107 99 - 110 mMol/L    CO2 20 (L) 21 - 32 MMOL/L    Anion Gap 11 4 - 16    BUN 12 6 - 23 MG/DL    Creatinine 0.9 0.9 - 1.3 MG/DL    Est, Glom Filt Rate >60 >60 mL/min/1.73m2    Glucose 108 (H) 70 - 99 MG/DL    Calcium 8.0 (L) 8.3 - 10.6 MG/DL    Albumin 2.1 (L) 3.4 - 5.0 GM/DL    Phosphorus 2.4 (L) 2.5 - 4.9 MG/DL   Magnesium    Collection Time: 08/02/23  4:40 AM   Result Value Ref Range    Magnesium

## 2023-08-02 NOTE — PROGRESS NOTES
V2.0    [facility] Progress Note      Name:  Erick Nolan /Age/Sex: 1965  (63 y.o. male)   MRN & CSN:  [id number] & [id number] Encounter Date/Time: 2023 1:05 PM EDT   Location:  -A PCP: No primary care provider on file. Attending:Francisco Javier Fisher,        Hospital Day: 4    Assessment and Recommendations   Erick Nolan is a 63 y.o. male with pmh of  who presents with Acute on chronic respiratory failure ([address])      Plan:   Acute metabolic encephalopathy secondary to acute ischemic right MCA CVA:  -Patient alert and oriented but waxing and waning mental status on admission, currently intubated, has left-sided paraplegia, L WK 3-3.5 days, NIH on admission 11, CT head with loss of ray-white differentiation in R. MCA territory. CTA head and neck with Right M1 MCA occlusion, severe R ICA stenosis, unchanged moderate P2 PCA severe narrowing. CTPA showed possible R MCA thrombosis. Patient on ASA, statin, neurochecks every 2 hours, PT/OT, SLP, neuro on board. MRI pending    Acute hypoxemic respiratory failure likely secondary to worsening size and extension of the right upper lobe possible aspiration pneumonitis versus HCAP pneumonia:  -VBG on admission with pH 7.42/PCO2 46/bicarb 29 on Vapotherm  -CTA chest withincreased size of RUL mass, invading the chest wall and nearby rib, probable consolidation distally, debris in trachea.  Continue Zosyn, add linezolid until MRSA is ruled out given recent hospitalization and septic picture, follow up resp cx  -Patient currently intubated, management as per [facility]    Pericardial effusion:  -Likely pretamponade as per cardiology  -Cardiology following  -Stat TTE EF greater than 65%, loculated pericardial effusion near the right ventricle, moderately increased in size compared to the previous echo 2023  -TTE from  did show small pericardial effusion at the time but no tamponade, EF 55 to 60%    Suspected sepsis: SIRS 3/4, IV antibiotics as above, redemonstrated. XR CHEST PORTABLE    Result Date: 8/1/2023  EXAMINATION: ONE XRAY VIEW OF THE CHEST 8/1/2023 12:30 am COMPARISON: 07/31/2023 at 2234 hours and 07/30/2023 HISTORY: ORDERING SYSTEM PROVIDED HISTORY: hypoxia TECHNOLOGIST PROVIDED HISTORY: Reason for exam:->hypoxia Reason for Exam: hypoxia FINDINGS: Endotracheal tube and nasogastric tube are redemonstrated. NG tube continues off the inferior edge of the image. External leads overlie the chest. Large mass in the right upper chest is redemonstrated. Multifocal opacities in the left lung are redemonstrated but worsened from 07/30/2023. Partial obscuration of the left hemidiaphragm. Cardiac silhouette is not enlarged. The bones appear stable. Multifocal opacities in the left lung are redemonstrated suggesting infectious or inflammatory etiology. Large right lung mass is redemonstrated with previous workup. Endotracheal tube is acceptable. XR CHEST PORTABLE    Result Date: 8/1/2023  EXAMINATION: ONE XRAY VIEW OF THE CHEST 7/31/2023 10:52 pm COMPARISON: 07/30/2023 and 06/17/2023 HISTORY: ORDERING SYSTEM PROVIDED HISTORY: intubation TECHNOLOGIST PROVIDED HISTORY: Reason for exam:->intubation Reason for Exam: intubation, NG Additional signs and symptoms: unknown Relevant Medical/Surgical History: TB FINDINGS: Endotracheal tube tip is approximately 6 cm above the joann. NG tube coursing into the stomach and off the exam.  External leads overlie the chest without central venous line identified. Large mass in the right upper lung is redemonstrated. Left hazy opacities and left basilar opacities are increased. Portions of the left hemidiaphragm are obscured. There is chronic scarring at the left apex. Cardiac silhouette is stable. 1.  Endotracheal tube tip is acceptable. NG tube courses into the stomach and off the exam. 2.  Increasing hazy opacities and basilar opacities at the left lung. Could be infectious or inflammatory.  3.  The

## 2023-08-02 NOTE — PROGRESS NOTES
and occipital  lobe. Unsure if this is due to underlying metastasis or some watershed  infarct in this region. 3.  However, no apparent mass/vasogenic edema or sulcal effacement in the  left cerebral hemisphere. Recommend: Brain MRI without and with contrast may be useful to confirm or  exclude underlying metastases. Above imaging personally reviewed. Assessment/Plan:   63year old male presenting with respiratory distress and left sided weakness. He also has metastatic lung cancer. Patient is now intubated. Sedation was turned off prior to me entering room. He does awaken to voice and able to follow few simple commands on right side. Appears to have left neglect. .     Left sided hemiparesis, right gaze preference secondary to stroke (favored) v metastatic lesion superimposed on respiratory distress/hypoxia in the setting of metastatic lung cancer, hypotension  Neuro imaging as above  CT head/CTA head and neck as above - loss of gray white matter junction in the right MCA with general edema, sulcal effacement - suspect right MCA occlusion/thrombus as well as low attenuation at the right parietal and occipital lobe unclear if this metastasis v watershed infarct  MRI brain w/wo contrast Pending. Unable to be completed at this time due to being on multiple infusions. Dr. Chava Rockwell neuro intervention consulted. No urgent intervention warranted at this time. Plans for outpatient follow up  Echocardiogram with no evidence of stroke precipitants. Continue aspirin, rosuvastatin for secondary stroke prevention. Avoid hypotension if possible. Pulmonology following   Oncology following   PT/OT/ST as recommended and tolerated  Will continue to follow pending completion of neurodiagnostics. Patient was discussed with Attending Neurologist Dr. Anaya Sow    > 40 minutes of time spent included chart review, obtaining history, patient examination, developing plan of care, and documentation. Amelie DEVLIN SILAS [facility] AG[facility]  Neurology

## 2023-08-02 NOTE — PROGRESS NOTES
V2.0    [facility] [place] Progress Note      Name:  Kirby Peña /Age/Sex: 1965  (63 y.o. male)   MRN & CSN:  [id number] & [id number] Encounter Date/Time: 2023 1:05 PM EDT   Location:  -A PCP: No primary care provider on file. Attending:Francisco Javier Morgan.,        Hospital Day: 4    Assessment and Recommendations   Kirby Peña is a 63 y.o. male with pmh of  who presents with Acute on chronic respiratory failure ([address])      Plan:   Acute metabolic encephalopathy secondary to acute ischemic right MCA CVA:  -Patient alert and oriented but waxing and waning mental status on admission, currently intubated, has left-sided paraplegia, L WK 3-3.5 days, NIH on admission 11, CT head with loss of ray-white differentiation in R. MCA territory. CTA head and neck with Right M1 MCA occlusion, severe R ICA stenosis, unchanged moderate P2 PCA severe narrowing. CTPA showed possible R MCA thrombosis. Patient on ASA, statin, neurochecks every 2 hours, PT/OT, SLP, neuro on board. MRI pending    Acute hypoxemic respiratory failure likely secondary to worsening size and extension of the right upper lobe possible aspiration pneumonitis versus HCAP pneumonia:  -VBG on admission with pH 7.42/PCO2 46/bicarb 29 on Vapotherm  -CTA chest withincreased size of RUL mass, invading the chest wall and nearby rib, probable consolidation distally, debris in trachea.  Continue Zosyn, add linezolid until MRSA is ruled out given recent hospitalization and septic picture, follow up resp cx  -Patient currently intubated, management as per [facility]    Pericardial effusion:  -Likely pretamponade as per cardiology  -Cardiology following  -Stat TTE EF greater than 65%, loculated pericardial effusion near the right ventricle, moderately increased in size compared to the previous echo 2023  -TTE from  did show small pericardial effusion at the time but no tamponade, EF 55 to 60%    Suspected sepsis: SIRS 3/4, IV antibiotics as above, 04/27/2023. CTA head and neck 06/23/2022. Carotid ultrasound 06/25/2022 HISTORY: ORDERING SYSTEM PROVIDED HISTORY: acute CVA TECHNOLOGIST PROVIDED HISTORY: Reason for exam:->acute CVA Has a \"code stroke\" or \"stroke alert\" been called? ->Yes Reason for Exam: acute CVA Relevant Medical/Surgical History: isovue 370 75ml FINDINGS: CTA NECK: AORTIC ARCH/ARCH VESSELS: No dissection or arterial injury. No significant stenosis of the brachiocephalic or subclavian arteries. CAROTID ARTERIES: No dissection, arterial injury, or hemodynamically significant stenosis by NASCET criteria. VERTEBRAL ARTERIES: No dissection, arterial injury, or significant stenosis. SOFT TISSUES: Partially imaged right upper lobe mass. No cervical or superior mediastinal lymphadenopathy. The larynx and pharynx are unremarkable. No acute abnormality of the salivary and thyroid glands. BONES: Partially imaged osseous erosion of the partially imaged ribs adjacent to the mass. No acute osseous abnormality. CTA HEAD: ANTERIOR CIRCULATION: Right M1 MCA occlusion (series 320, image 55). Severe stenosis right cavernous ICA, new . Otherwise no significant stenosis of the intracranial internal carotid, anterior cerebral, or middle cerebral arteries. No aneurysm. POSTERIOR CIRCULATION: Moderate to severe narrowing of the left P2 PCA is unchanged. Otherwise no significant stenosis of the vertebral, basilar, or posterior cerebral arteries. No aneurysm. OTHER: No dural venous sinus thrombosis on this non-dedicated study. BRAIN: Right MCA distribution acute infarct. No mass effect or midline shift. No extra-axial fluid collection. Right M1 MCA occlusion, new. Right MCA distribution acute infarct. Intracranially, severe stenosis right cavernous ICA, new. Intracranially, moderate to severe narrowing of the left P2 PCA is unchanged. No vascular findings in the CTA neck.  Partially imaged right upper lobe mass with adjacent rib erosion, suspicious for

## 2023-08-03 NOTE — PROGRESS NOTES
V2.0    [facility] [place] Progress Note      Name:  Gerardo Forde /Age/Sex: 1965  (63 y.o. male)   MRN & CSN:  [id number] & [id number] Encounter Date/Time: 8/3/2023 1:05 PM EDT   Location:  -A PCP: No primary care provider on file. Attending:Francisco Javier Santiago DO       Hospital Day: 5    Assessment and Recommendations   Gerardo Forde is a 63 y.o. male with pmh of  who presents with Acute on chronic respiratory failure ([address])      Plan:   Acute metabolic encephalopathy secondary to acute ischemic right MCA CVA:  -Patient alert and oriented but waxing and waning mental status on admission, currently intubated, has left-sided paraplegia, L WK 3-3.5 days, NIH on admission 11, CT head with loss of ray-white differentiation in R. MCA territory. CTA head and neck with Right M1 MCA occlusion, severe R ICA stenosis, unchanged moderate P2 PCA severe narrowing. CTPA showed possible R MCA thrombosis. Patient on ASA, statin, neurochecks every 2 hours, PT/OT, SLP, neuro on board. MRI pending    Acute hypoxemic respiratory failure likely secondary to worsening size and extension of the right upper lobe possible aspiration pneumonitis versus HCAP pneumonia:  -VBG on admission with pH 7.42/PCO2 46/bicarb 29 on Vapotherm  -CTA chest withincreased size of RUL mass, invading the chest wall and nearby rib, probable consolidation distally, debris in trachea.  Continue Zosyn, add linezolid until MRSA is ruled out given recent hospitalization and septic picture, follow up resp cx  -Patient currently intubated, management as per [facility]    Pericardial effusion:  -Likely pretamponade as per cardiology  -Cardiology following  -Stat TTE EF greater than 65%, loculated pericardial effusion near the right ventricle, moderately increased in size compared to the previous echo 2023  -TTE from  did show small pericardial effusion at the time but no tamponade, EF 55 to 60%    Suspected sepsis: SIRS 3/4, IV antibiotics as above, Oval-shaped spiculated nodule within the left upper lobe is unchanged measuring 1.4 cm maximally. Spiculated lesion within the left lower lobe is mildly increased in size when compared to the previous exam. There is a new infiltrate along the inferior margin of the mass in the right upper lobe, possibly postobstructive pneumonitis. There is diffuse bronchial wall thickening. Filling defects are seen in the left lower lobe airways. Multiple centrilobular punctate tree-in-bud nodules are identified in the left lower lobe. Debris is seen layering within the trachea. All these findings are seen superimposed on a background of severe emphysema. Upper Abdomen: Lymphadenopathy within the upper abdomen is seen, which there is increased in size when compared to the previous exam. Soft Tissues/Bones: Again, bony destruction of the right upper ribs. No other bone lesions are identified. No evidence of pulmonary embolism. Large right upper lobe mass which has increased in size when compared to the previous exam from June, with progressive chest invasion and bony erosion. Probable metastatic disease in the left lung which is similar to perhaps slightly increased when compared to the previous exam. There is a new infiltrate along the inferior margin of the mass, which likely reflects postobstructive pneumonitis. There is bronchial wall thickening seen bilaterally. Filling defects are seen in the left lower lobe airways. In addition, there are numerous punctate tree-in-bud centrilobular nodules within the left lower lobe, which likely reflects bronchiolitis versus aspiration.  Debris layering within the trachea which may reflect mucus versus aspirated material.     CTA HEAD NECK W CONTRAST    Result Date: 7/31/2023  EXAMINATION: CTA OF THE HEAD AND NECK WITH CONTRAST 7/30/2023 11:22 pm: TECHNIQUE: CTA of the head and neck was performed with the administration of intravenous contrast. Multiplanar reformatted images are

## 2023-08-03 NOTE — FLOWSHEET NOTE
08/03/23 0810   Spontaneous Awakening Trial (SAT) RN Doc   Absolute Contraindications present? None   Relative Contraindications present? None   Contacted Provider About Relative Contraindications?v Not necessary   Did patient fail SAT? No   Reasons patient failed SAT None   RT Notified Ready for SBT Yes  (Rica RT at bedside)     Pt on Precedex, RASS -1.

## 2023-08-03 NOTE — PROGRESS NOTES
Place patient back on Full support AC 20 450 30% peep 5.     Per Jonah Fell APRN please switch back to CPAP at 6 AM.

## 2023-08-03 NOTE — PROGRESS NOTES
08/03/23 1058   Encounter Summary   Encounter Overview/Reason  Initial Encounter   Service Provided For: Patient   Referral/Consult From: [employer]   Support System Unknown   Last Encounter  08/03/23  (Patient is intubated and can not communicate. I observed silence and showed pastoral presence. I offered prayer for patient.  No family member was present at the time of visit.)   Complexity of Encounter Low   Begin Time 1040   End Time  1051   Total Time Calculated 11 min   Encounter    Type Initial Screen/Assessment   Spiritual/Emotional needs   Type Spiritual Support   Rituals, Rites and Sacraments   Type Blessings   Assessment/Intervention/Outcome   Assessment Unable to assess   Intervention Prayer (assurance of)/[place]   Outcome Did not respond   Plan and Referrals   Plan/Referrals Continue to visit, (comment)

## 2023-08-03 NOTE — PROGRESS NOTES
Patient not following commands very well. Squeezed a hand and moved feet, Did not move head off pillow, hard to open eyes. RSBI 60 best NIF -13.

## 2023-08-03 NOTE — PROGRESS NOTES
Comprehensive Nutrition Assessment    Type and Reason for Visit:  Reassess    Nutrition Recommendations/Plan:   Continue current TF- Vital AF 1.2 (peptide based formula) @ 45mL/hr continuous w/ 105mL free water flush Q6H to provide 1296kcal, 81g PRO, and 1300mL fluids per day  Replete electrolytes as needed K, P  Monitor weight, glucose, lytes, GI status, HOB, POC     Malnutrition Assessment:  Malnutrition Status:  Severe malnutrition (07/31/23 1316)    Context:  Chronic Illness       Nutrition Assessment:    Pt remains intubated, sedated (off propofol), +1 pressor, per RN weaning sedation to attempt extubation. TF running at goal at bedside. Will modify TF to better meet needs and continue to follow at high nutrition risk. Nutrition Related Findings:    +cathi, precedex; glucose 117-135, albumin 2.2, P 2.0, hgb 7.1 Wound Type: None       Current Nutrition Intake & Therapies:    Average Meal Intake: NPO  Average Supplements Intake: NPO  Diet NPO  ADULT TUBE FEEDING; Orogastric; Peptide Based; Continuous; 15; Yes; 10; Q 6 hours; 45; 105; Q 6 hours  Current Tube Feeding (TF) Orders:  Feeding Route: Orogastric  Formula: Peptide Based  Schedule: Continuous  Feeding Regimen: 45  Additives/Modulars: None  Water Flushes: 105 Q6H  Current TF & Flush Orders Provides: Vital AF 1.2 (peptide based formula) @ 45mL/hr continuous w/ 105mL free water flush Q6H to provide 1296kcal, 81g PRO, and 1300mL fluids per day  Goal TF & Flush Orders Provides: Vital AF 1.2 (peptide based formula) @ 45mL/hr continuous w/ 105mL free water flush Q6H to provide 1296kcal, 81g PRO, and 1300mL fluids per day  Additional Calorie Sources:  propofol stopped    Anthropometric Measures:  Height: 5' 9\" (175.3 cm)  Ideal Body Weight (IBW): 160 lbs (73 kg)    Admission Body Weight: 99 lb 3.3 oz (45 kg) (bedscale)  Current Body Weight: 116 lb 13.5 oz (53 kg),   IBW.  Weight Source: Bed Scale  Current BMI (kg/m2): 17.2  Usual Body Weight: 128 lb (58.1 kg)

## 2023-08-03 NOTE — PROGRESS NOTES
Neurology Progress Note  [facility]  Patient Name: Trista Prieto     : 1965      Subjective:   CC: stroke like symptoms  The patient was seen and examined. Chart reviewed in detail. Patient remains intubated. On Precedex. He does awaken to voice and was able to follow few simple commands on right side. No family at bedside. Code status was changed to Full code yesterday. Objective:   Scheduled Meds:   ipratropium 0.5 mg-albuterol 2.5 mg  1 Dose Inhalation 4x Daily RT    sodium chloride (Inhalant)  4 mL Nebulization Q4H    piperacillin-tazobactam  4,500 mg IntraVENous Q8H    enoxaparin  40 mg SubCUTAneous Daily    aspirin  81 mg Oral Daily    Or    aspirin  300 mg Rectal Daily    dextrose  50 g IntraVENous Once    chlorhexidine  15 mL Mouth/Throat BID    famotidine (PEPCID) injection  20 mg IntraVENous BID    rosuvastatin  40 mg Oral Nightly     Continuous Infusions:   dexmedetomidine 0.6 mcg/kg/hr (23 0420)    phenylephrine (NATALIE-SYNEPHRINE) 50 mg/250 mL infusion ([id number]) 40 mcg/min (23 0645)    dextrose      fentaNYL Stopped (23 0845)    propofol Stopped (23 0845)    sodium chloride       PRN Meds:.fentanNYL, midazolam, dextrose, glucose, dextrose bolus **OR** dextrose bolus, glucagon (rDNA), dextrose, sodium chloride flush, sodium chloride, ondansetron **OR** ondansetron, polyethylene glycol, acetaminophen **OR** acetaminophen, potassium chloride **OR** potassium alternative oral replacement **OR** potassium chloride, magnesium sulfate, labetalol, sodium chloride flush    Vital Signs:  Vitals:    23 0500 23 0600 23 0700 23 0815   BP:       Pulse: 76 59 88 77   Resp: 24 23 24    Temp:       TempSrc:       SpO2: 100% 100% 100% 100%   Weight:  116 lb 13.5 oz (53 kg)     Height:           General: intubated.  Awakens to voice  HEENT: NC/AT, EOMI, Pupils sluggish, mmm, neck supple  Extremities: no edema, no calf tenderness

## 2023-08-03 NOTE — PROGRESS NOTES
Pt seen fully awake with HR of 140-160 and RR of 35-37, pt is following commands and able to communicate thru Y/N. Pt expressed wanting some pain medication. Fentanyl PRN IV pushes given. Pt seen calmed down afterwards with HR of 70-80 and RR of 20. Inquired from Adela Jane NP if pt will be on CPAP all night, and ordered to be placed on vent tonight and be back on CPAP tomorrow am. Informed Carl MARTINEZ.

## 2023-08-03 NOTE — PROGRESS NOTES
BUN 12 6 - 23 MG/DL    Creatinine 0.7 (L) 0.9 - 1.3 MG/DL    Est, Glom Filt Rate >60 >60 mL/min/1.73m2    Glucose 117 (H) 70 - 99 MG/DL    Calcium 7.5 (L) 8.3 - 10.6 MG/DL    Albumin 2.2 (L) 3.4 - 5.0 GM/DL    Phosphorus 2.0 (L) 2.5 - 4.9 MG/DL   CBC with Auto Differential    Collection Time: 08/03/23  4:45 AM   Result Value Ref Range    WBC 26.9 (H) 4.0 - 10.5 K/CU MM    RBC 2.73 (L) 4.6 - 6.2 M/CU MM    Hemoglobin 7.1 (L) 13.5 - 18.0 GM/DL    Hematocrit 23.9 (L) 42 - 52 %    MCV 87.5 78 - 100 FL    MCH 26.0 (L) 27 - 31 PG    MCHC 29.7 (L) 32.0 - 36.0 %    RDW 16.2 (H) 11.7 - 14.9 %    Platelets 226 449 - 230 K/CU MM    MPV 9.7 7.5 - 11.1 FL    Differential Type AUTOMATED DIFFERENTIAL     Segs Relative 91.0 (H) 36 - 66 %    Lymphocytes % 2.4 (L) 24 - 44 %    Monocytes % 3.7 0 - 4 %    Eosinophils % 0.4 0 - 3 %    Basophils % 0.3 0 - 1 %    Segs Absolute 24.5 K/CU MM    Lymphocytes Absolute 0.7 K/CU MM    Monocytes Absolute 1.0 K/CU MM    Eosinophils Absolute 0.1 K/CU MM    Basophils Absolute 0.1 K/CU MM    Nucleated RBC % 0.0 %    Total Nucleated RBC 0.0 K/CU MM    Total Immature Neutrophil 0.59 K/CU MM    Immature Neutrophil % 2.2 (H) 0 - 0.43 %   Triglyceride    Collection Time: 08/03/23  4:45 AM   Result Value Ref Range    Triglycerides 161 (H) <150 MG/DL       Electronically signed by Vanessa Mondargon DO on 8/3/2023 at 9:05 AM

## 2023-08-03 NOTE — PROGRESS NOTES
Patient's sister spoke to this RN about wanting to talk to a doctor to change code status of patient from limited to full code.  Informed Pao Joiner NP about family's request.

## 2023-08-03 NOTE — PROGRESS NOTES
Per RN pt still on a lot of drips and they are trying to get the patient more settled down. However, patient is still not MRI appropriate at this time. Will check back tomorrow.

## 2023-08-04 NOTE — PROGRESS NOTES
V2.0  [facility] [place]      Name:  Tammy Guevara /Age/Sex: 1965  (63 y.o. male)   MRN & CSN:  [id number] & [id number] Encounter Date/Time: 2023 9:17 AM EDT    Location:  -A PCP: No primary care provider on file.        Hospital Day: 6    Assessment and Plan:   Tammy Guevara is a 63 y.o. male with pmh of advanced non-small cell lung cancer, COPD, right MCA stroke, HLD, who presents with Acute on chronic respiratory failure (HCC)      Plan:  Acute on chronic hypoxic respiratory failure, s/p extubation 2023  Suspected pneumonia  versus aspiration pneumonitis  Septic shock  Worsening right upper lobe mass, hx of advanced right lung on small cell lung cancer  Acute on chronic anemia, unknown etiology  Acute right MCA stroke with  left-sided weakness  Questionable seizure  HTN  HLD  Chronic hep C      Neuro:  patient is extubated this morning, gurgly, able to state his name, knows he is in the hospital, weak, and following on the right upper and lower extremities, left-sided hemiparesis from right MCA stroke, neurology following, outpatient follow-up with neuro interventional list-Dr. Vivian Alberto, avoid hypotension, continue aspirin, statin for secondary stroke prevention  cardio: On phenylephrine at 40 mics per hour, wean as tolerated to maintain MAP> 65 mmHg, echocardiogram on -EF 55 to 60%, mild loculated pericardial effusion noted near the right ventricle no tamponade physiology  Respiratory: Extubated this morning, tolerating well on 2 L nasal cannula, secretions+ with weak cough secondary to MCA stroke, needs frequent suctioning, MetaNebs,, continue 3% nebs, DuoNebs  GI- Protonix twice daily for GI prophylaxis and acute on chronic anemia requiring blood transfusions, follow-up Hemoccult, needs swallow evaluation-possibly tomorrow  : Mild metabolic acidosis-CO2 19, hyperchloremic, avoid NS, hypokalemia-2.9 today,-replacing total 80 mEq, renal function-WNL, replace electrolytes FINDINGS: Endotracheal tube and nasogastric tube are redemonstrated. NG tube continues off the inferior edge of the image. External leads overlie the chest. Large mass in the right upper chest is redemonstrated. Multifocal opacities in the left lung are redemonstrated but worsened from 07/30/2023. Partial obscuration of the left hemidiaphragm. Cardiac silhouette is not enlarged. The bones appear stable. Multifocal opacities in the left lung are redemonstrated suggesting infectious or inflammatory etiology. Large right lung mass is redemonstrated with previous workup. Endotracheal tube is acceptable. XR CHEST PORTABLE    Result Date: 8/1/2023  EXAMINATION: ONE XRAY VIEW OF THE CHEST 7/31/2023 10:52 pm COMPARISON: 07/30/2023 and 06/17/2023 HISTORY: ORDERING SYSTEM PROVIDED HISTORY: intubation TECHNOLOGIST PROVIDED HISTORY: Reason for exam:->intubation Reason for Exam: intubation, NG Additional signs and symptoms: unknown Relevant Medical/Surgical History: TB FINDINGS: Endotracheal tube tip is approximately 6 cm above the joann. NG tube coursing into the stomach and off the exam.  External leads overlie the chest without central venous line identified. Large mass in the right upper lung is redemonstrated. Left hazy opacities and left basilar opacities are increased. Portions of the left hemidiaphragm are obscured. There is chronic scarring at the left apex. Cardiac silhouette is stable. 1.  Endotracheal tube tip is acceptable. NG tube courses into the stomach and off the exam. 2.  Increasing hazy opacities and basilar opacities at the left lung. Could be infectious or inflammatory. 3.  The large right mass is again noted and has had previous workup.      VL DUP UPPER EXTREMITY ARTERIES BILATERAL    Result Date: 8/1/2023  EXAMINATION: DUPLEX ARTERAL ULTRASOUND OF THE BILATERAL UPPER EXTREMITIES  7/31/2023 2:52 pm TECHNIQUE: Duplex ultrasound using B-mode/gray scaled imaging, Doppler spectral

## 2023-08-04 NOTE — PROGRESS NOTES
V2.0    [facility] [place], [place] Progress Note      Name:  Alyssa Shankar /Age/Sex: 1965  (63 y.o. male)   MRN & CSN:  [id number] & [id number] Encounter Date/Time: 2023 1:05 PM EDT   Location:  -A PCP: No primary care provider on file. Attending:Francisco Javier Thayer,        Hospital Day: 6    Assessment and Recommendations   Alyssa Shankar is a 63 y.o. male with pmh of  who presents with Acute on chronic respiratory failure ([address])      Plan:   Acute metabolic encephalopathy secondary to acute ischemic right MCA CVA:  -Patient alert and oriented but waxing and waning mental status on admission, currently intubated, has left-sided paraplegia, L WK 3-3.5 days, NIH on admission 11, CT head with loss of ray-white differentiation in R. MCA territory. CTA head and neck with Right M1 MCA occlusion, severe R ICA stenosis, unchanged moderate P2 PCA severe narrowing. CTPA showed possible R MCA thrombosis. Patient on ASA, statin, neurochecks every 2 hours, PT/OT, SLP, neuro on board. MRI pending    Acute hypoxemic respiratory failure likely secondary to worsening size and extension of the right upper lobe possible aspiration pneumonitis versus HCAP pneumonia:  -VBG on admission with pH 7.42/PCO2 46/bicarb 29 on Vapotherm  -CTA chest withincreased size of RUL mass, invading the chest wall and nearby rib, probable consolidation distally, debris in trachea.  Continue Zosyn, add linezolid until MRSA is ruled out given recent hospitalization and septic picture, follow up resp cx  -Patient currently intubated, management as per [facility]    Pericardial effusion:  -Likely pretamponade as per cardiology  -Cardiology following  -Stat TTE EF greater than 65%, loculated pericardial effusion near the right ventricle, moderately increased in size compared to the previous echo 2023  -TTE from  did show small pericardial effusion at the time but no tamponade, EF 55 to 60%    Suspected sepsis: SIRS 3/4, IV antibiotics as above, Otherwise no significant stenosis of the vertebral, basilar, or posterior cerebral arteries. No aneurysm. OTHER: No dural venous sinus thrombosis on this non-dedicated study. BRAIN: Right MCA distribution acute infarct. No mass effect or midline shift. No extra-axial fluid collection. Right M1 MCA occlusion, new. Right MCA distribution acute infarct. Intracranially, severe stenosis right cavernous ICA, new. Intracranially, moderate to severe narrowing of the left P2 PCA is unchanged. No vascular findings in the CTA neck. Partially imaged right upper lobe mass with adjacent rib erosion, suspicious for malignancy. Critical results were called by Dr. Ferro Like to Dr. Michelle Rios on 7/31/2023 at 00:16. CBC:   Recent Labs     08/03/23  0445 08/04/23  0500 08/04/23  0615   WBC 26.9* 16.0* 15.6*   HGB 7.1* 6.2* 6.0*    205 212       BMP:    Recent Labs     08/02/23  0440 08/03/23  0445 08/04/23  0500    136 138   K 4.2 3.5 2.9*    107 111*   CO2 20* 19* 19*   BUN 12 12 11   CREATININE 0.9 0.7* 0.6*   GLUCOSE 108* 117* 124*       Hepatic:   No results for input(s): AST, ALT, ALB, BILITOT, ALKPHOS in the last 72 hours. Lipids:   Lab Results   Component Value Date/Time    CHOL 153 07/31/2023 04:45 AM    HDL 36 07/31/2023 04:45 AM    TRIG 161 08/03/2023 04:45 AM     Hemoglobin A1C:   Lab Results   Component Value Date/Time    LABA1C 5.4 07/31/2023 04:45 AM     TSH: No results found for: TSH  Troponin:   Lab Results   Component Value Date/Time    TROPONINT <0.010 07/30/2023 06:29 PM    TROPONINT <0.010 06/07/2023 04:00 PM    TROPONINT <0.010 03/28/2023 01:10 AM     Lactic Acid: No results for input(s): LACTA in the last 72 hours. BNP:   No results for input(s): PROBNP in the last 72 hours.     UA:  Lab Results   Component Value Date/Time    NITRU NEGATIVE 08/01/2023 01:26 PM    COLORU YELLOW 08/01/2023 01:26 PM    WBCUA 8 08/01/2023 01:26 PM    RBCUA 4 08/01/2023 01:26 PM    MUCUS RARE

## 2023-08-04 NOTE — PROGRESS NOTES
SLP ALL NOTES  Facility/Department: [facility] ICU   CLINICAL BEDSIDE SWALLOW EVALUATION    NAME: Leobardo De La Rosa  : 1965  MRN: [id number]    ADMISSION DATE: 2023  ADMITTING DIAGNOSIS: has Acute ischemic stroke ([address]); Lung mass; Pneumonia of right lung due to infectious organism; Severe protein-calorie malnutrition ([address]); Metabolic encephalopathy; Gait disturbance; Left-sided weakness; Acute kidney injury (SEAN) with acute tubular necrosis (ATN) ([address]); Chronic obstructive pulmonary disease with acute lower respiratory infection ([address]); Essential hypertension; Squamous cell carcinoma of right lung ([address]); History of CVA (cerebrovascular accident); Adjustment disorder with mixed anxiety and depressed mood; Bipolar depression ([address]); Malignant neoplasm of upper lobe of right lung ([address]); Acute on chronic respiratory failure ([address]); Severe malnutrition ([address]); Occlusion and stenosis of right middle cerebral artery; New onset a-fib ([address]); and Stroke determined by clinical assessment ([address]) on their problem list.  ONSET DATE: this admission    IMPRESSIONS AND RECOMMENDATIONS:  Leobardo De La Rosa was referred for a bedside swallow evaluation after being admitted to [facility] with chronic respiratory failure. He was intubated -. Pt has was diagnosed with right lung cancer in 2023. Per chart review, head CTA revealed, \"Right MCA distribution acute infarct,\" \"Intracranially, severe stenosis right cavernous ICA, new. \" Pt has no known dysphagia hx prior to this admission. Medical hx includes CVA of right MCA (2022), COPD, HTN, Lung cancer, pericardial effusion, and tuberculosis. Pt was seen seated upright in bed, alert, cooperative, but lethargic. RN reports that pt has a wet cough/vocal quality, which SLP also observed. Right sided gaze preference was also noted. Oral mechanism revealed general weakness, but no signs of asymmetry. Pt was presented with PO trials of ice chips.  Prior to presentation of first ice chip, he

## 2023-08-04 NOTE — PROGRESS NOTES
The patient tolerated breathing trial this morning, minimal secretions with endotracheal tube suctioning hence the patient was extubated. Postextubation, it is rather obvious this gentleman has a very poor weak cough (likely attributed to recent stroke), marginal airway clearance. The patient will require nasotracheal suctioning which I reviewed with the nursing staff. He is at high risk of requiring reintubation and if so, then he would require a tracheostomy mainly for airway clearance.     Kyra Lindquist  [phone]

## 2023-08-04 NOTE — PROGRESS NOTES
Introduced Metaneb to patient. Patient having a slight hard time holding treatment in hand to mouth. Placed a pillow under right elbow to make it easier to hold and closer to mouth.

## 2023-08-04 NOTE — CONSENT
Informed Consent for Blood Component Transfusion Note    I have discussed with the sister the rationale for blood component transfusion; its benefits in treating or preventing fatigue, organ damage, or death; and its risk which includes mild transfusion reactions, rare risk of blood borne infection, or more serious but rare reactions. I have discussed the alternatives to transfusion, including the risk and consequences of not receiving transfusion. The sister had an opportunity to ask questions and had agreed to proceed with transfusion of blood components.     Electronically signed by ALANA Gifford CNP on 8/4/23 at 6:46 AM EDT

## 2023-08-04 NOTE — PROGRESS NOTES
Patient extubated per Dr. Machelle Kendrick order. Patient awake, following commands, moving feet, squeezing hands. Extubated to 2 lpm nc, sating 100%. Will continue to monitor.

## 2023-08-05 NOTE — PROGRESS NOTES
Spoke w ordering MD last night & he ok'd cancelling the MRI order, for the time being, just until the pt is more stable & able to travel to MRI w only 2 lines running.

## 2023-08-05 NOTE — PROGRESS NOTES
Patient's bg is 56mg/dl. Given dextrose IV per protocol. Rechecked bg after 15min is 68mg/dl. Given another dose of dextrose IV prn. Repeat bg is 236 mg/dl.

## 2023-08-05 NOTE — PROGRESS NOTES
Pt's bg is 68mg/dl. Adela Jane NP made aware via PS  of pt's bg trends, ordered to start Dextrose IV @ 75. Rechecked bg after an hour is 81mg/dl.

## 2023-08-05 NOTE — PROGRESS NOTES
Attestation Note    I have personally seen and examined the patient independently. I have reviewed the patient's available data,including medical history and recent test results. Reviewed and discussed note as documented by TYRELL. I agree with the physical exam findings, assessment and plan. My documented MDM is a substantive portion of the supervisory note. Acute respiratory failure with hypoxemia  Hypotension, sepsis (suspected pneumonia) plus hypovolemia  Pericardial effusion (?malignant), no definitve tamponade  Advanced non small cell lung cancer  COPD/Emphysema  History acute R MCA stroke (left hemiparesis)  History Hyperlipidemia  Protein calorie malnutrition     Continue supplemental O2, SPO2 goal 90-94%  BD, pulmonary hygiene measures  ATB de-escalated to Rocephin, 7 day course  Pressor, titrate off mean BP 65 mm Hg  Monitor renal function, electrolytes, urine output  DVT, ulcer prophylaxis     Given recent stoke, currently poses issue with pulmonary hygiene (impaired cough, airway clearance). High risk of respiratory decompensation necessitating re-intubation (and if so), tracheostomy given current goals of care. Complex decisions required for evaluation, management, reviewed during critical care rounds. The patient suffers from critical illness including respiratory failure, severe sepsis. Without current level of supportive medical measures including mechanical ventilation, provision of antimicrobial therapy, fluid administration, progressive worsening of organ function and untoward mortality. In total, 40 minutes of critical care time devoted to today's evaluation and management.      Suzy Espinal  [phone]

## 2023-08-05 NOTE — PROGRESS NOTES
[facility]  DEPARTMENT OF SPEECH/LANGUAGE PATHOLOGY  DAILY PROGRESS NOTE  Tammy Guevara  8/5/2023  [id number]  Lung mass [R91.8]  Hypoxia [R09.02]  New onset a-fib [facility]) [I48.91]  Acute on chronic respiratory failure (HCC) [J96.20]  Pneumonia of right lung due to infectious organism, unspecified part of lung [J18.9]  No Known Allergies      Pt was seen this date for dysphagia treatment. IMPRESSION AND RECOMMENDATIONS: Pt was alert and cooperative. PO trials of ice chips adminisitered. Oral phase was severely impaired, charecterized by anterior loss of bolus, poor oral control, poor mastication coordination, and pocketing of bolus on the right side. Pharyngeal phase appears severely impaired characterized by late swallow initiation and decreased laryngeal elevation. 2x immediate cough after ice chip trails by spoon. RT suctioned patient after cough. Recommend continued NPO. Results and recommendations d/w RN and pt. GOALS (current status in bold):  Short-term Goals  Timeframe for Short-term Goals: LOS  Goal 1: Pt will participate in repeat bedside swallow evaluation for potential diet initiation, as able. Goal 2: Pt/caregivers will inidicate understanding of education and recommendations.                              EDUCATION: results/recommendations    PAIN RATING (0-10 Scale): 0  Time in/Time out: SLP Individual Minutes  Time In: 1430  Time Out: 1440  Minutes: 10    Visit number: [address], SLP  8/5/2023  8:57 AM

## 2023-08-05 NOTE — PROGRESS NOTES
V2.0    [facility] [place]      Name:  Korin Raines /Age/Sex: 1965  (63 y.o. male)   MRN & CSN:  [id number] & [id number] Encounter Date/Time: 2023 1:05 PM EDT   Location:  -A PCP: No primary care provider on file. Attending:Edward M Omer Gottron.,        Hospital Day: 7    Assessment and Recommendations   Korin Raines is a 63 y.o. male with pmh of  who presents with Acute on chronic respiratory failure ([address])      Plan:   Acute metabolic encephalopathy secondary to acute ischemic right MCA CVA:  -Patient alert and oriented but waxing and waning mental status on admission, currently intubated, has left-sided paraplegia, L WK 3-3.5 days, NIH on admission 11, CT head with loss of ray-white differentiation in R. MCA territory. CTA head and neck with Right M1 MCA occlusion, severe R ICA stenosis, unchanged moderate P2 PCA severe narrowing. CTPA showed possible R MCA thrombosis. Patient on ASA, statin, neurochecks every 2 hours, PT/OT, SLP, neuro on board. MRI pending    Acute hypoxemic respiratory failure likely secondary to worsening size and extension of the right upper lobe possible aspiration pneumonitis versus HCAP pneumonia:  -VBG on admission with pH 7.42/PCO2 46/bicarb 29 on Vapotherm  -CTA chest withincreased size of RUL mass, invading the chest wall and nearby rib, probable consolidation distally, debris in trachea.  Continue Zosyn, add linezolid until MRSA is ruled out given recent hospitalization and septic picture, follow up resp cx  -Patient currently intubated, management as per [facility]    Pericardial effusion:  -Likely pretamponade as per cardiology  -Cardiology following  -Stat TTE EF greater than 65%, loculated pericardial effusion near the right ventricle, moderately increased in size compared to the previous echo 2023  -TTE from  did show small pericardial effusion at the time but no tamponade, EF 55 to 60%    Suspected sepsis: SIRS 3/4, IV antibiotics as above,

## 2023-08-06 NOTE — PROGRESS NOTES
Attestation Note    I have personally seen and examined the patient independently. I have reviewed the patient's available data,including medical history and recent test results. Reviewed and discussed note as documented by TYRELL. I agree with the physical exam findings, assessment and plan. My documented MDM is a substantive portion of the supervisory note. Acute respiratory failure with hypoxemia  Hypotension, sepsis (suspected pneumonia) plus hypovolemia  Pericardial effusion (?malignant), no definitve tamponade  Advanced non small cell lung cancer  COPD/Emphysema  History acute R MCA stroke (left hemiparesis)  History Hyperlipidemia  Protein calorie malnutrition     Continue supplemental O2, SPO2 goal 90-94%  BD, pulmonary hygiene measures  ATB Rocephin, 7 day course for Rx pneumonia  Monitor renal function, electrolytes, urine output  DVT, ulcer prophylaxis  Enteral nutritional support via NGT     Recent stoke currently poses issue with pulmonary hygiene (impaired cough, airway clearance). High risk of respiratory decompensation necessitating re-intubation (and if so), tracheostomy given current goals of care. Goals of care will be reviewed with POA today     Complex decisions required for evaluation, management, reviewed during critical care rounds. The patient suffers from critical illness including respiratory failure, severe sepsis. Without current level of supportive medical measures including mechanical ventilation, provision of antimicrobial therapy, fluid administration, progressive worsening of organ function and untoward mortality. In total, 42 minutes of critical care time devoted to today's evaluation and management.      Octavia Rangel  [phone]

## 2023-08-06 NOTE — PROGRESS NOTES
mL, PRN        Labs      Recent Results (from the past 24 hour(s))   POCT Glucose    Collection Time: 08/05/23 12:06 PM   Result Value Ref Range    POC Glucose 61 (L) 70 - 99 MG/DL   POCT Glucose    Collection Time: 08/05/23 12:24 PM   Result Value Ref Range    POC Glucose 57 (L) 70 - 99 MG/DL   POCT Glucose    Collection Time: 08/05/23  2:34 PM   Result Value Ref Range    POC Glucose 109 (H) 70 - 99 MG/DL   Potassium    Collection Time: 08/05/23  3:20 PM   Result Value Ref Range    Potassium 3.2 (L) 3.5 - 5.1 MMOL/L   POCT Glucose    Collection Time: 08/05/23  5:09 PM   Result Value Ref Range    POC Glucose 74 70 - 99 MG/DL   EKG 12 Lead    Collection Time: 08/05/23  9:58 PM   Result Value Ref Range    Ventricular Rate 136 BPM    Atrial Rate 136 BPM    P-R Interval 114 ms    QRS Duration 74 ms    Q-T Interval 280 ms    QTc Calculation (Bazett) 421 ms    P Axis 74 degrees    R Axis 82 degrees    T Axis 63 degrees    Diagnosis       Sinus tachycardia with premature atrial complexes  Nonspecific ST and T wave abnormality  Abnormal ECG  When compared with ECG of 31-JUL-2023 22:53,  premature atrial complexes are now present  ST no longer depressed in Lateral leads     Critical Care Panel    Collection Time: 08/05/23 10:55 PM   Result Value Ref Range    Sodium 138 135 - 145 MMOL/L    Potassium 3.3 (L) 3.5 - 5.1 MMOL/L    Chloride 112 (H) 99 - 110 mMol/L    CO2 18 (L) 21 - 32 MMOL/L    Anion Gap 8 4 - 16    BUN 5 (L) 6 - 23 MG/DL    Creatinine 0.6 (L) 0.9 - 1.3 MG/DL    Est, Glom Filt Rate >60 >60 mL/min/1.73m2    Glucose 108 (H) 70 - 99 MG/DL    Calcium 7.5 (L) 8.3 - 10.6 MG/DL    Phosphorus 1.4 (L) 2.5 - 4.9 MG/DL    Magnesium 1.5 (L) 1.8 - 2.4 mg/dl   POCT Glucose    Collection Time: 08/06/23  8:01 AM   Result Value Ref Range    POC Glucose 70 70 - 99 MG/DL        Imaging/Diagnostics Last 24 Hours   XR CHEST PORTABLE    Result Date: 8/1/2023  EXAMINATION: ONE XRAY VIEW OF THE CHEST 8/1/2023 2:59 am COMPARISON: 08/01/2023 at 0035 hours and 07/31/2023 HISTORY: ORDERING SYSTEM PROVIDED HISTORY: post cv insertion TECHNOLOGIST PROVIDED HISTORY: Reason for exam:->post cv insertion Reason for Exam: post cv insertion Additional signs and symptoms: post cv insertion FINDINGS: Endotracheal tube tip is approximately 5 cm above the joann. NG tube courses below the diaphragm and off the exam.  There is a new right jugular central venous catheter with the tip over the SVC. The large mass in the right upper lung is redemonstrated. Multifocal opacities in the left lung are also redemonstrated. Obscuration of the left hemidiaphragm. Scarring in the left upper lobe and apex. Cardiac silhouette is not enlarged. There is no sign of a right pneumothorax. New right jugular catheter with the tip over the SVC. No sign of a right pneumothorax. Multifocal opacities in the left lung are again noted and may relate to infectious or inflammatory etiology. Large mass in the right upper lung is redemonstrated. XR CHEST PORTABLE    Result Date: 8/1/2023  EXAMINATION: ONE XRAY VIEW OF THE CHEST 8/1/2023 12:30 am COMPARISON: 07/31/2023 at 2234 hours and 07/30/2023 HISTORY: ORDERING SYSTEM PROVIDED HISTORY: hypoxia TECHNOLOGIST PROVIDED HISTORY: Reason for exam:->hypoxia Reason for Exam: hypoxia FINDINGS: Endotracheal tube and nasogastric tube are redemonstrated. NG tube continues off the inferior edge of the image. External leads overlie the chest. Large mass in the right upper chest is redemonstrated. Multifocal opacities in the left lung are redemonstrated but worsened from 07/30/2023. Partial obscuration of the left hemidiaphragm. Cardiac silhouette is not enlarged. The bones appear stable. Multifocal opacities in the left lung are redemonstrated suggesting infectious or inflammatory etiology. Large right lung mass is redemonstrated with previous workup. Endotracheal tube is acceptable.      XR CHEST PORTABLE    Result Date:

## 2023-08-06 NOTE — PLAN OF CARE
Problem: Discharge Planning  Goal: Discharge to home or other facility with appropriate resources  Outcome: Not Progressing     Problem: Nutrition Deficit:  Goal: Optimize nutritional status  Outcome: Not Progressing  Pt is attempting to work towards goals with assistance from staff.   Will continue to monitor

## 2023-08-06 NOTE — CARDIO/PULMONARY
Pt's heart rate has been climbing since the beginning of shift, HR has been 124-145. EKG ordered and done. Tylenol 1 gram given per NG tube. Pt was hooked up to bladder probe for temp. Has felt warm,  temp was 100 after initial hook up,  has been suction out numerous times. Will sound clear for shayla 2-4 minutes and then has his normal breath sounds. Rhonchus thru out.   Will continue to be close to pt, in room at bedside for a short while

## 2023-08-06 NOTE — ACP (ADVANCE CARE PLANNING)
Attempted to call patients sister, Jose Elias Borden, again as there has been no family present today and she did answer. I discussed with Jose Elias Borden the patients worsening clinical picture, with his inability to maintain oral secretions, tachycardia, and tachypnea. I expressed with her that if he got re-intubated he would need a trach/peg and would likely live the rest of his life at an ECF d/t his CVA and extensive cancer. She was appropriately tearful and said that he would never want a trach/peg but she wanted him to remain a full code. I had to explain multiple times that if he were to remain a full code then we will need to intubate him, consult surgery, and he will have a trach/peg placed, and she would then proceed to say, Marrose Epi would never want that\". She then said \"I dont know what you want from me\". I continually deescalated the conversation by explaining the clinical picture and the patients baseline functional status prior to coming to the hospital.  She ultimately agreed to a change of code status to DNRCCA/DNI with a consult to Hospice. KILEY Molina was present for the entirety of the conversation.

## 2023-08-06 NOTE — PROGRESS NOTES
primary CCM, wcc downtrending 11k<<<32k, curently on penylephrine gtt  8/6-Pt seen & examined, having excessive secretions requiring frequent suctioning, currently weaning O2, labs w electrolytes wnl. Plan per CCM to review [address] w POA today    Review of Systems:      Pertinent positives and negatives discussed in HPI    Objective: Intake/Output Summary (Last 24 hours) at 8/6/2023 1143  Last data filed at 8/6/2023 1021  Gross per 24 hour   Intake 3159.3 ml   Output 3000 ml   Net 159.3 ml        Vitals:   Vitals:    08/06/23 0800 08/06/23 0900 08/06/23 1000 08/06/23 1126   BP: (!) 111/57 (!) 107/51 116/61    Pulse: 92 94 89 90   Resp: (!) 32 27 30 28   Temp:       TempSrc:       SpO2: 100% 99% 99% 100%   Weight:       Height:             Physical Exam:      General: NAD  Eyes: EOMI  ENT: neck supple  Cardiovascular: Regular rate. Respiratory: Clear to auscultation  Gastrointestinal: Soft, non tender  Genitourinary: no suprapubic tenderness  Musculoskeletal: No edema  Skin: warm, dry  Neuro: Alert. Psych: Mood appropriate.          Medications:   Medications:    pantoprazole  40 mg IntraVENous BID    enoxaparin  30 mg SubCUTAneous Daily    thiamine  100 mg Oral Daily    cefTRIAXone (ROCEPHIN) IV  1,000 mg IntraVENous Q24H    ipratropium 0.5 mg-albuterol 2.5 mg  1 Dose Inhalation 4x Daily RT    aspirin  81 mg Oral Daily    Or    aspirin  300 mg Rectal Daily    dextrose  50 g IntraVENous Once    rosuvastatin  40 mg Oral Nightly      Infusions:    dextrose 5% in lactated ringers 75 mL/hr at 08/06/23 1021    sodium chloride      dextrose      sodium chloride Stopped (08/05/23 2739)     PRN Meds: potassium chloride, 20 mEq, PRN  hydrOXYzine HCl, 10 mg, TID PRN  sodium phosphate IVPB, 10 mmol, PRN   Or  sodium phosphate IVPB, 15 mmol, PRN   Or  sodium phosphate IVPB, 20 mmol, PRN  sodium chloride, , PRN  dextrose, 12.5 g, PRN  glucose, 4 tablet, PRN  dextrose bolus, 125 mL, PRN   Or  dextrose bolus, 250 mL, PRN  glucagon input(s): LACTA in the last 72 hours. BNP:   No results for input(s): PROBNP in the last 72 hours.     UA:  Lab Results   Component Value Date/Time    NITRU NEGATIVE 08/01/2023 01:26 PM    COLORU YELLOW 08/01/2023 01:26 PM    WBCUA 8 08/01/2023 01:26 PM    RBCUA 4 08/01/2023 01:26 PM    MUCUS RARE 06/07/2023 08:23 PM    TRICHOMONAS NONE SEEN 08/01/2023 01:26 PM    BACTERIA OCCASIONAL 08/01/2023 01:26 PM    CLARITYU CLEAR 08/01/2023 01:26 PM    SPECGRAV 1.010 08/01/2023 01:26 PM    LEUKOCYTESUR NEGATIVE 08/01/2023 01:26 PM    UROBILINOGEN 4.0 08/01/2023 01:26 PM    BILIRUBINUR NEGATIVE 08/01/2023 01:26 PM    BLOODU SMALL NUMBER OR AMOUNT OBSERVED 08/01/2023 01:26 PM    KETUA NEGATIVE 08/01/2023 01:26 PM    AMORPHOUS RARE 08/01/2023 01:26 PM     Urine Cultures: No results found for: LABURIN  Blood Cultures: No results found for: BC  No results found for: BLOODCULT2  Organism: No results found for: [facility]      Electronically signed by Nereyda Jain MD on 8/6/2023 at 11:43 AM

## 2023-08-07 NOTE — PROGRESS NOTES
[facility] SPEECH/LANGUAGE PATHOLOGY    Tammyzack SmithPaola  8/7/2023  [phone]    Attempted to see Tammy Guevara for repeated dysphagia evaluation. Chart reviewed and spoke with RN at bedside. Pt is not adequately managing secretions, code status has been changed by family, and hospice has been consulted. Will defer further follow-up at this time.     Mare Michaud, SLP  8/7/2023  10:48 AM

## 2023-08-07 NOTE — PROGRESS NOTES
phosphate IVPB, 10 mmol, PRN   Or  sodium phosphate IVPB, 15 mmol, PRN   Or  sodium phosphate IVPB, 20 mmol, PRN  sodium chloride, , PRN  dextrose, 12.5 g, PRN  glucose, 4 tablet, PRN  dextrose bolus, 125 mL, PRN   Or  dextrose bolus, 250 mL, PRN  glucagon (rDNA), 1 mg, PRN  dextrose, , Continuous PRN  sodium chloride flush, 5-40 mL, PRN  sodium chloride, , PRN  ondansetron, 4 mg, Q8H PRN   Or  ondansetron, 4 mg, Q6H PRN  polyethylene glycol, 17 g, Daily PRN  acetaminophen, 650 mg, Q6H PRN   Or  acetaminophen, 650 mg, Q6H PRN  potassium chloride, 40 mEq, PRN   Or  potassium alternative oral replacement, 40 mEq, PRN  magnesium sulfate, 2,000 mg, PRN  labetalol, 10 mg, Q10 Min PRN  sodium chloride flush, 5-40 mL, PRN        Labs and Imaging   CT HEAD WO CONTRAST    Result Date: 7/30/2023  EXAMINATION: CT OF THE HEAD WITHOUT CONTRAST  7/30/2023 10:34 pm TECHNIQUE: CT of the head was performed without the administration of intravenous contrast. Automated exposure control, iterative reconstruction, and/or weight based adjustment of the mA/kV was utilized to reduce the radiation dose to as low as reasonably achievable. COMPARISON: 06/30/2022 HISTORY: ORDERING SYSTEM PROVIDED HISTORY: AMS. rule out metastatic disease TECHNOLOGIST PROVIDED HISTORY: Reason for exam:->AMS. rule out metastatic disease Has a \"code stroke\" or \"stroke alert\" been called? ->No Reason for Exam: AMS. rule out metastatic disease FINDINGS: BRAIN/VENTRICLES: The lack of IV contrast reduces evaluation for metastases. There is a heterogenous area in the brain parenchyma in the right parietal lobe and edge of the a septal lobe with surrounding low attenuation suggesting site of metastasis and vasogenic edema. Additional areas of low attenuation with loss of the gray-white matter junction in the right frontal lobe and into the corona radiata white matter.   Low attenuation and loss of the gray-white matter junction along the right insular ribbon and into COMPARISON: 07/30/2023 and 06/17/2023 HISTORY: ORDERING SYSTEM PROVIDED HISTORY: intubation TECHNOLOGIST PROVIDED HISTORY: Reason for exam:->intubation Reason for Exam: intubation, NG Additional signs and symptoms: unknown Relevant Medical/Surgical History: TB FINDINGS: Endotracheal tube tip is approximately 6 cm above the joann. NG tube coursing into the stomach and off the exam.  External leads overlie the chest without central venous line identified. Large mass in the right upper lung is redemonstrated. Left hazy opacities and left basilar opacities are increased. Portions of the left hemidiaphragm are obscured. There is chronic scarring at the left apex. Cardiac silhouette is stable. 1.  Endotracheal tube tip is acceptable. NG tube courses into the stomach and off the exam. 2.  Increasing hazy opacities and basilar opacities at the left lung. Could be infectious or inflammatory. 3.  The large right mass is again noted and has had previous workup. XR CHEST PORTABLE    Result Date: 7/30/2023  EXAMINATION: ONE XRAY VIEW OF THE CHEST 7/30/2023 6:43 pm COMPARISON: Chest 06/17/2023, CT PA 06/07/2023 HISTORY: sepsis FINDINGS: *2 images are presented. *Persistent dense opacity central upper right lung keeping with mass, infectious process or combination of the two. Correlate with prior biopsy results. *Obscuration right hilum. *Pulmonary sequela typical of that seen with smoking, including COPD. *No pleural effusion or pneumothorax evident. *Cardiomediastinal and LEFT hilar silhouettes appear unremarkable. *No acute osseous abnormality. 1. Persistent dense opacity central upper right lung keeping with mass, infectious process or combination of the two. Correlate with prior biopsy results. 2. Possible right hilar adenopathy. 3.  Pulmonary sequela typical of that seen with smoking, including COPD. 4. No pleural effusion or pneumothorax evident.      VL DUP UPPER EXTREMITY ARTERIES BILATERAL    Result

## 2023-08-07 NOTE — PROGRESS NOTES
Attestation Note    I have personally seen and examined the patient independently. I have reviewed the patient's available data,including medical history and recent test results. Reviewed and discussed note as documented by TYRELL. I agree with the physical exam findings, assessment and plan. My documented MDM is a substantive portion of the supervisory note. Acute respiratory failure with hypoxemia  Hypotension, sepsis (suspected pneumonia) plus hypovolemia  Pericardial effusion (?malignant), no definitve tamponade  Advanced non small cell lung cancer  COPD/Emphysema  History acute R MCA stroke (left hemiparesis)  History Hyperlipidemia  Protein calorie malnutrition     Continue supplemental O2, SPO2 goal 90-94%  BD, pulmonary hygiene measures  ATB Rocephin, complete 7 day course for Rx pneumonia  Monitor renal function, electrolytes, urine output  DVT, ulcer prophylaxis       Recent stoke currently poses issue with pulmonary hygiene (impaired cough, airway clearance). Goals of care reviewed with POA (sister) 8/6/2023 by Randi Joyce NP-C. Pursuing comfort care measures since the patient \"would not want tracheosotmy, feeding tube and life in nursing home\". Hence Hospice consulted to assist with management. Complex decisions required for evaluation, management, reviewed during critical care rounds. Given current goals of care, the patient can be transferred out of ICU transitioning care to hospital medicine service.      Sami Sosa  [phone]

## 2023-08-07 NOTE — PROGRESS NOTES
Comprehensive Nutrition Assessment    Type and Reason for Visit:  Reassess    Nutrition Recommendations/Plan:   May provide EN if within POC, recommend Vital AF (peptide based formula) @ 50mL/hr to provide 1440kcal and 90g PRO  If patient without IVF, recommend 105ml FWF Q6H to provide a total of 1500mL fluids per day  Monitor for diet advancement, weights, labs, POC     Malnutrition Assessment:  Malnutrition Status:  Severe malnutrition (07/31/23 1316)    Context:  Chronic Illness       Nutrition Assessment:    Extubated 8/4, has been NPO per SLP, continuing to trial po intakes but pt lethargic. Noted recent stroke and high risk of reintubation, however code status was changed and hospice consulted. Noted new pressure injuries. May provide EN for comfort if within POC, will provide recs and continue to follow at high risk. Nutrition Related Findings:    +D5W, K 3.0, P 2.0, glucose  Wound Type: Pressure Injury, Multiple       Current Nutrition Intake & Therapies:    Average Meal Intake: NPO  Average Supplements Intake: NPO  Diet NPO  ADULT TUBE FEEDING; Orogastric; Peptide Based; Continuous; 15; Yes; 10; Q 6 hours; 45; 105; Q 6 hours    Anthropometric Measures:  Height: 5' 9\" (175.3 cm)  Ideal Body Weight (IBW): 160 lbs (73 kg)    Admission Body Weight: 99 lb 3.3 oz (45 kg) (bedscale)  Current Body Weight: 102 lb 11.8 oz (46.6 kg),   IBW.  Weight Source: Bed Scale  Current BMI (kg/m2): 15.2  Usual Body Weight: 128 lb (58.1 kg) (4/12/23)  % Weight Change (Calculated): -22.5  Weight Adjustment For: No Adjustment                 BMI Categories: Underweight (BMI less than 18.5)    Estimated Daily Nutrient Needs:  Energy Requirements Based On: Kcal/kg  Weight Used for Energy Requirements: Current  Energy (kcal/day): 4995-2623 (28-32kcal/kg)  Weight Used for Protein Requirements: Ideal  Protein (g/day): (1.2-1.4g/kg IBW)  Method Used for Fluid Requirements: 1 ml/kcal  Fluid (ml/day): 1500    Nutrition

## 2023-08-07 NOTE — PROGRESS NOTES
Brief neurology note:  Chart was reviewed in detail. Continue to await MRI results. Patient with worsening respiratory status following extubation. Patient does have a history of lung cancer with recent chemotherapy, he is frail and deconditioned. Left hemiparesis and swallowing difficulties could also be contributing factor to poor respiratory status given difficulty with airway protection and impaired cough. Family opting not to reintubate/trach or PEG placement as they state this would be against the patient's wishes. Hospice has been consulted. We will continue to follow loosely pending clinical course.   [address] [facility], APRN - [facility]  Neurology

## 2023-08-08 NOTE — PROGRESS NOTES
Attestation Note    I have personally seen and examined the patient independently. I have reviewed the patient's available data,including medical history and recent test results. Reviewed and discussed note as documented by TYRELL. I agree with the physical exam findings, assessment and plan. My documented MDM is a substantive portion of the supervisory note. 35 minutes        Acute respiratory failure with hypoxemia  Hypotension, sepsis (suspected pneumonia) plus hypovolemia, resolved  Pericardial effusion (?malignant), no definitve tamponade  Advanced non small cell lung cancer  COPD/Emphysema  History acute R MCA stroke (left hemiparesis)  History Hyperlipidemia  Protein calorie malnutrition     Continue supplemental O2, SPO2 goal 90-94%  BD, pulmonary hygiene measures  ATB Rocephin, complete 7 day course for Rx pneumonia  Monitor renal function, electrolytes, urine output  DVT, ulcer prophylaxis       Recent stoke currently poses issue with pulmonary hygiene (impaired cough, airway clearance). Goals of care reviewed with POA (sister) 8/6/2023 by TONYA Han. Pursuing comfort care measures since the patient \"would not want tracheosotmy, feeding tube and life in nursing home\". Hence Hospice consulted to assist with management. Complex decisions required for evaluation, management, reviewed during critical care rounds. Given current goals of care, the patient can be transferred out of ICU transitioning care to hospital medicine service.      Nell Lagos  [phone]

## 2023-08-08 NOTE — PROGRESS NOTES
V2.0  [facility] [place] Critical Care Progress Note      Name:  Fariba Rodriguez /Age/Sex: 1965  (63 y.o. male)   MRN & CSN:  [id number] & [id number] Encounter Date/Time: 2023 9:17 AM EDT    Location:  -A PCP: No primary care provider on file. Hospital Day: 10    Assessment and Plan:   Fariba Rodriguez is a 63 y.o. male with pmh of advanced non-small cell lung cancer, COPD, right MCA stroke, HLD, who presents with Acute on chronic respiratory failure ([address])      Plan:  Acute on chronic hypoxic respiratory failure, s/p extubation 2023  Suspected pneumonia  versus aspiration pneumonitis  Septic shock  Worsening right upper lobe mass, hx of advanced right lung on small cell lung cancer  Acute on chronic anemia, unknown etiology  Acute right MCA stroke with  left-sided weakness  Questionable seizure  HTN  HLD  Chronic hep C      Neuro:  patient remains extubated for now, limited code-noted to ACLS protocol except for resuscitative medications, nonverbal this morning, no eye-opening, not following commands, withdrawal to pain in the right upper and lower extremities, left-sided hemiparesis from right MCA stroke, neurology following, hospice consulted, no critical care needs, patient transferred to med/surge floor, continue aspirin, statin for secondary stroke prevention. Cardio: Remains tachycardic, echocardiogram on -EF 55 to 60%, mild loculated pericardial effusion noted near the right ventricle no tamponade physiology  Respiratory: On room air,, increasing [id number],  with weak cough secondary to MCA stroke, needs frequent suctioning, MetaNebs, continue 3% nebs, DuoNebs.    GI- Protonix  for GI prophylaxis and acute on chronic anemia requiring blood transfusions, follow-up Hemoccult, failed swallow evaluation-NGT placed, not tolerating tube feeds  : Mild metabolic acidosis-CO2 19, renal function-WNL, replace electrolytes as needed, monitor urine output  Heme: Hb-stable between 7-8,, FL    Differential Type AUTOMATED DIFFERENTIAL     Segs Relative 81.0 (H) 36 - 66 %    Lymphocytes % 9.7 (L) 24 - 44 %    Monocytes % 7.4 (H) 0 - 4 %    Eosinophils % 0.6 0 - 3 %    Basophils % 0.3 0 - 1 %    Segs Absolute 13.2 K/CU MM    Lymphocytes Absolute 1.6 K/CU MM    Monocytes Absolute 1.2 K/CU MM    Eosinophils Absolute 0.1 K/CU MM    Basophils Absolute 0.1 K/CU MM    Nucleated RBC % 0.0 %    Total Nucleated RBC 0.0 K/CU MM    Total Immature Neutrophil 0.17 K/CU MM    Immature Neutrophil % 1.0 (H) 0 - 0.43 %   Critical Care Panel    Collection Time: 08/08/23  8:40 AM   Result Value Ref Range    Sodium 130 (L) 135 - 145 MMOL/L    Potassium 3.5 3.5 - 5.1 MMOL/L    Chloride 102 99 - 110 mMol/L    CO2 19 (L) 21 - 32 MMOL/L    Anion Gap 9 4 - 16    BUN 4 (L) 6 - 23 MG/DL    Creatinine 0.7 (L) 0.9 - 1.3 MG/DL    Est, Glom Filt Rate >60 >60 mL/min/1.73m2    Glucose 98 70 - 99 MG/DL    Calcium 8.2 (L) 8.3 - 10.6 MG/DL    Phosphorus 2.6 2.5 - 4.9 MG/DL    Magnesium 1.6 (L) 1.8 - 2.4 mg/dl        Imaging/Diagnostics Last 24 Hours   XR CHEST PORTABLE    Result Date: 8/1/2023  EXAMINATION: ONE XRAY VIEW OF THE CHEST 8/1/2023 2:59 am COMPARISON: 08/01/2023 at 0035 hours and 07/31/2023 HISTORY: ORDERING SYSTEM PROVIDED HISTORY: post cv insertion TECHNOLOGIST PROVIDED HISTORY: Reason for exam:->post cv insertion Reason for Exam: post cv insertion Additional signs and symptoms: post cv insertion FINDINGS: Endotracheal tube tip is approximately 5 cm above the joann. NG tube courses below the diaphragm and off the exam.  There is a new right jugular central venous catheter with the tip over the SVC. The large mass in the right upper lung is redemonstrated. Multifocal opacities in the left lung are also redemonstrated. Obscuration of the left hemidiaphragm. Scarring in the left upper lobe and apex. Cardiac silhouette is not enlarged. There is no sign of a right pneumothorax.      New right jugular catheter with the tip

## 2023-08-08 NOTE — PROGRESS NOTES
V2.0    [facility] [place]      Name:  Miguel Cota /Age/Sex: 1965  (63 y.o. male)   MRN & CSN:  [id number] & [id number] Encounter Date/Time: 2023 1:05 PM EDT   Location:  -A PCP: No primary care provider on file. Attending:Francisco Javier Zhang.DO       Hospital Day: 10    Assessment and Recommendations   Miguel Cota is a 63 y.o. male with pmh of  who presents with Acute on chronic respiratory failure ([address])      Plan:   Acute metabolic encephalopathy secondary to acute ischemic right MCA CVA:  -Patient alert and oriented but waxing and waning mental status on admission,  has left-sided paraplegia, L WK 3-3.5 days, NIH on admission 11, CT head with loss of ray-white differentiation in R. MCA territory. CTA head and neck with Right M1 MCA occlusion, severe R ICA stenosis, unchanged moderate P2 PCA severe narrowing. CTPA showed possible R MCA thrombosis. Patient on ASA, statin, neurochecks. Given poor mental status, decline in p.o. intake and patient's previous wishes plan for hospice consultation. Acute hypoxemic respiratory failure likely secondary to worsening size and extension of the right upper lobe possible aspiration pneumonitis versus HCAP pneumonia:  -VBG on admission with pH 7.42/PCO2 46/bicarb 29 on Vapotherm  -CTA chest withincreased size of RUL mass, invading the chest wall and nearby rib, probable consolidation distally, debris in trachea.   -De-escalated to his Rocephin as per critical care      Pericardial effusion:  -Cardiology following  -Stat TTE EF greater than 65%, loculated pericardial effusion near the right ventricle, moderately increased in size compared to the previous echo 2023. No tamponade physiology noted.   -TTE from  did show small pericardial effusion at the time but no tamponade, EF 55 to 60%    Suspected sepsis: SIRS 3/4, IV antibiotics as above, blood cultures, respiratory cultures, continue IV fluids    Leukocytosis:  Right lung

## 2023-08-09 NOTE — FLOWSHEET NOTE
Sister has yet to arrive to discuss treatment plan. Dr Kaylee Lyn. Lizzeth Kendrick, hospitalist, aware and tried to contact her without success. States he wants a page if she comes in and he will speak with her.

## 2023-08-09 NOTE — PROGRESS NOTES
V2.0    [facility] [place] Progress Note      Name:  Tammy Guevara /Age/Sex: 1965  (63 y.o. male)   MRN & CSN:  [id number] & [id number] Encounter Date/Time: 2023 1:05 PM EDT   Location:  -A PCP: No primary care provider on file. Lidia Sanches MD       Hospital Day: 11    Assessment and Recommendations   Tammy Guevara is a 63 y.o. male with pmh of  who presents with Acute on chronic respiratory failure ([address])      Plan:   Acute metabolic encephalopathy secondary to acute ischemic right MCA CVA:  -Patient alert and oriented but waxing and waning mental status on admission,  has left-sided paraplegia, L WK 3-3.5 days prior to admission, NIH on admission 11, CT head with loss of ray-white differentiation in R. MCA territory. CTA head and neck with Right M1 MCA occlusion, severe R ICA stenosis, unchanged moderate P2 PCA severe narrowing. CTPA showed possible R MCA thrombosis. Patient on ASA, statin, neurochecks. Given poor mental status, decline in p.o. intake and patient's previous wishes plan for hospice consultation. However sister was a decision maker canceled a hospice meeting yesterday. She is slated to arrive today and I will does have a discussion regarding patient's further plan today. Acute hypoxemic respiratory failure likely secondary to worsening size and extension of the right upper lobe possible aspiration pneumonitis versus HCAP pneumonia:  -VBG on admission with pH 7.42/PCO2 46/bicarb 29 on Vapotherm  -CTA chest withincreased size of RUL mass, invading the chest wall and nearby rib, probable consolidation distally, debris in trachea.   -De-escalated to his Rocephin as per critical care and completed course. Still spiking fevers. Recent blood cultures and will likely need antibiotics due to postobstructive pneumonia.       Pericardial effusion:  -Cardiology following  -Stat TTE EF greater than 65%, loculated pericardial effusion near the right ventricle, moderately

## 2023-08-09 NOTE — PROGRESS NOTES
[address] [facility]  DEPARTMENT OF SPEECH/LANGUAGE PATHOLOGY  DAILY PROGRESS NOTE  Poncho Vickers  8/9/2023  [id number]  Lung mass [R91.8]  Hypoxia [R09.02]  New onset a-fib [facility]) [I48.91]  Acute on chronic respiratory failure (HCC) [J96.20]  Pneumonia of right lung due to infectious organism, unspecified part of lung [J18.9]  No Known Allergies      Pt was seen this date for dysphagia treatment. IMPRESSION AND RECOMMENDATIONS:   Pt was seen for dysphagia follow-up reassessment. He was seen seated upright in bed, alert and cooperative, but lethargic. Pt had a wet, gurgled vocal quality upon entering room. He was presented with PO trials of ice chips, mildly thick liquids via tsp, and moderately thick liquids via tsp. Oral stage was impaired, with reduced bolus acceptance/labial seal and prolonged oral manipulation. Pt was noted to have right anterior loss of mildly thick and moderately thick liquids. Pharyngeal stage seemed impaired, with delayed swallow initiation and multiple effortful swallows on each presentation. Pt coughed on presentation of ice chip, mildly thick liquids x1, and moderately thick liquids x2. Wet vocal quality continued throughout session despite SLP using suctioning. Recommend continued NPO. SLP will continue to monitor. Results/recommendations d/w RN and pt. GOALS (current status in bold):  Short-term Goals  Timeframe for Short-term Goals: LOS  Goal 1: Pt will participate in repeat bedside swallow evaluation for potential diet initiation, as able. Meeting; continue as able  Goal 2: Pt/caregivers will inidicate understanding of education and recommendations.  Meeting; continue as able    EDUCATION: recommendations/POC    PAIN RATING (0-10 Scale): not assessed  Time in/Time out: SLP Individual Minutes  Time In: 9307  Time Out: 1330  Minutes: 15    Visit number: 3      Gloria George  8/9/2023  1:40 PM

## 2023-08-09 NOTE — PROGRESS NOTES
Brief neurology note:  Chart was reviewed in detail. Patient was discussed with IM. As noted previously patient has significant residual deficit left hemiparesis secondary to right MCA infarct. Continues to have difficulty with managing oral secretions, oral intake. Patient was deconditioned, frail, ill-appearing on admission and likely has diminished reserve for recovery. Additionally patient currently undergoing chemotherapy for lung malignancy. Due to continued respiratory difficulty, inability to eat family was contacted regarding reintubation and likely need for trach and PEG tube. Per notes patient's sister states that he would not want these interventions. Hospice was consulted however patient's sister canceled meeting with hospice yesterday. From neurology standpoint, no further workup is needed. We will sign off at this time. Please contact with any new concerns.     [address], APRN - CNP  Neurology

## 2023-08-09 NOTE — PROGRESS NOTES
Hematology/Oncology   Progress Note    Patient Name:  Miguel Cota  Patient :  1965  Patient MRN:  [id number]     Primary Oncologist: Dr Cliff Alvarez  Referring Provider: No primary care provider on file. Date of Service: 2023      HPI:   Miguel Cota is a pleasant 63 y.o. male known to oncology in treatment for Squamous Cell Carcinoma with recent initiation of chemoradiation with radiation starting 2023 weekly Carbo/Taxol last receiving C2D1 2023. He presented to the ER with respiratory distress and AMS with oxygen saturations on the 70s on EMS arrival to home. Stroke alert was called with workup consistent for R M1 MCA occlusion, not a tPA candidate. CT was unable to r/o brain metastases, non-specific low attenuation could be watershed infarct vs metastasis. MRI is pending although was negative in early . CTA of the chest was significant for enlargement of RUL mass with progressive chest invasion and bony erosion. Also notes area of concern in left lung which did not seem metastatic on previous PET, ?larger than prior. Debris in trachea noted. He is on Zosyn for post-obstructive pneumonia. He is alert and oriented on exam today with L sided weakness. Answers briefly with no elaboration. Mouth dry. Fatigued, mostly wishes to rest on exam. Afebrile and HDS. Pt was aware about h.o CVA in . Not on ASA. 2023 Hgb 6.0. Received 1u PRBC. Leukocytosis improved to 15, Plt WNL. 2023 he responded to verbal stimuli. S/p extubation on 2023. WBC 15.3, Hg 7.9, MCV 88.6, plat 146.     2023 he remains lethargic. Family decided to have hospice evaluation. 2023 WBC 16.3, Hg 7.5, MCV 89.1, plat 176. On O2 via NC. Dr Nayla Handley called me and I appreciated his input. 2023 he is more awake and alert. Follows command. Reports by the nurse, sister canceled hospice meeting. He is hypoglycemic and on D5 continues IV. I discuss with Dr Nayla Handley and Dr Hortencia Plunkett.   07/31/2023    AST 12 (L) 07/31/2023     Lab Results   Component Value Date    IRON 30 (L) 06/08/2023    TIBC 161 (L) 06/08/2023    FERRITIN 1,780 (H) 06/08/2023     Imaging:  XR CHEST PORTABLE   Preliminary Result   Removal of ET tube since prior. Persistent large right upper lobe mass. Irregular left lung airspace disease is again noted with decreased size of   the layering left pleural effusion. XR CHEST PORTABLE   Final Result   1. Termination of the gastric tube within the distal thoracic esophagus as   above. Recommend advancement of approximately 15 cm. 2. Persistent though decreased basilar predominant airspace opacities in the   left lung suggesting improved pneumonia. Underlying malignancy is not   excluded given areas of hypermetabolism the comparison PET. 3. No significant change in the biopsy-proven malignancy in the apical to mid   right lung. XR CHEST PORTABLE   Final Result   New right jugular catheter with the tip over the SVC. No sign of a right   pneumothorax. Multifocal opacities in the left lung are again noted and may relate to   infectious or inflammatory etiology. Large mass in the right upper lung is redemonstrated. XR CHEST PORTABLE   Final Result   Multifocal opacities in the left lung are redemonstrated suggesting   infectious or inflammatory etiology. Large right lung mass is redemonstrated with previous workup. Endotracheal tube is acceptable. XR CHEST PORTABLE   Final Result   1. Endotracheal tube tip is acceptable. NG tube courses into the stomach   and off the exam.      2.  Increasing hazy opacities and basilar opacities at the left lung. Could   be infectious or inflammatory. 3.  The large right mass is again noted and has had previous workup. VL DUP UPPER EXTREMITY ARTERIES BILATERAL   Final Result   No evidence of hemodynamic stenosis or occlusion in either upper extremity.    Arteries are patent

## 2023-08-09 NOTE — FLOWSHEET NOTE
Called GISSELLE engel phone back to give report and phone went straight to [address] recording.

## 2023-08-10 NOTE — CARE COORDINATION
Attempted visit. Patient opens eyes and looks at this CM. Asked him if I could talk to him- he shook his head NO. Asked him if he wanted a feeding tube - he shook his head NO. Spoke to Dr Evgeny Crawford- he left  for patient's sister to come and discuss goals of care; this CM also left VM on 8/9. Vince Fletcher RN     1903 Spoke to Dr Evgeny Crawford. He spoke to the sister and she will be in today after 3 PM to discuss goals of care. Vince Fletcher RN     1986 Sister here. Spoke to sister re:OHOD. Discussed OHOD philosophy- comfort and quality of life. She is agreeable to have OHOD revisit. Patient wants tubes, wires, etc off and \"to be done\". Remington Rodriguez called ; spoke to [facility].  Vince Fletcher RN     0090 Remington Rodriguez meeting 8/11 at 11 am. Vince Fletcher RN

## 2023-08-10 NOTE — PROGRESS NOTES
V2.0    [facility] Progress Note      Name:  Riaz Bailey /Age/Sex: 1965  (63 y.o. male)   MRN & CSN:  [id number] & [id number] Encounter Date/Time: 8/10/2023 1:05 PM EDT   Location:  -A PCP: No primary care provider on file. Magali Gomez MD       Hospital Day: 12    Assessment and Recommendations   Riaz Bailey is a 63 y.o. male with pmh of  who presents with Acute on chronic respiratory failure ([address])      Plan:   Acute metabolic encephalopathy secondary to acute ischemic right MCA CVA:  -Patient alert and oriented but waxing and waning mental status on admission,  has left-sided paraplegia, L WK 3-3.5 days prior to admission, NIH on admission 11, CT head with loss of ray-white differentiation in R. MCA territory. CTA head and neck with Right M1 MCA occlusion, severe R ICA stenosis, unchanged moderate P2 PCA severe narrowing. CTPA showed possible R MCA thrombosis. Patient on ASA, statin, neurochecks. Given poor mental status, decline in p.o. intake and patient's previous wishes plan for hospice consultation. However sister was a decision maker canceled a hospice meeting yesterday. Sister was not able to visit yesterday, I talked to her on the phone this morning. She will visit this evening and will discuss comfort measures at that time given grim prognosis and patient refusing measures such as feeding tube. Acute hypoxemic respiratory failure likely secondary to worsening size and extension of the right upper lobe possible aspiration pneumonitis versus HCAP pneumonia:  -VBG on admission with pH 7.42/PCO2 46/bicarb 29 on Vapotherm  -CTA chest withincreased size of RUL mass, invading the chest wall and nearby rib, probable consolidation distally, debris in trachea.   -De-escalated to his Rocephin as per critical care and completed course. Repeat blood cultures blood cultures pending hold off on antibiotics for now.       Pericardial effusion:  -Cardiology following  -Stat TTE thickening. Filling defects are seen in the left lower lobe airways. Multiple centrilobular punctate tree-in-bud nodules are identified in the left lower lobe. Debris is seen layering within the trachea. All these findings are seen superimposed on a background of severe emphysema. Upper Abdomen: Lymphadenopathy within the upper abdomen is seen, which there is increased in size when compared to the previous exam. Soft Tissues/Bones: Again, bony destruction of the right upper ribs. No other bone lesions are identified. No evidence of pulmonary embolism. Large right upper lobe mass which has increased in size when compared to the previous exam from June, with progressive chest invasion and bony erosion. Probable metastatic disease in the left lung which is similar to perhaps slightly increased when compared to the previous exam. There is a new infiltrate along the inferior margin of the mass, which likely reflects postobstructive pneumonitis. There is bronchial wall thickening seen bilaterally. Filling defects are seen in the left lower lobe airways. In addition, there are numerous punctate tree-in-bud centrilobular nodules within the left lower lobe, which likely reflects bronchiolitis versus aspiration. Debris layering within the trachea which may reflect mucus versus aspirated material.     CTA HEAD NECK W CONTRAST    Result Date: 7/31/2023  EXAMINATION: CTA OF THE HEAD AND NECK WITH CONTRAST 7/30/2023 11:22 pm: TECHNIQUE: CTA of the head and neck was performed with the administration of intravenous contrast. Multiplanar reformatted images are provided for review. MIP images are provided for review. Stenosis of the internal carotid arteries measured using NASCET criteria. Automated exposure control, iterative reconstruction, and/or weight based adjustment of the mA/kV was utilized to reduce the radiation dose to as low as reasonably achievable. COMPARISON: Brain MRI 06/08/2023. PET CT 04/27/2023.   CTA

## 2023-08-10 NOTE — ACP (ADVANCE CARE PLANNING)
Met with patient's sister Wei Fox at bedside. She is the next of kin. Patient unable to make decisions for himself due to stroke. Explained to Millie regarding patient's grim prognosis given progression of lung cancer and multiple strokes. Explained to her that patient is not able to swallow and has been recommended to be n.p.o. as per speech eval.  Patient requiring D10 drip due to hypoglycemia. discussed with her that when I tried to talk to the patient regarding a feeding tube he has adamantly refused. She tells me he has done that in the past and would not like to live on machines. Discussed transition to comfort care. Sister aware of prognosis and what she wants to pursue comfort measures for her brother. Stop telemetry and blood draws. Earlier this morning I discussed with hospice doctor and he would qualify for inpatient hospice.  informed. They will set up meeting with sister and hospice liaison to sign consent and then can be transition to inpatient hospice.

## 2023-08-10 NOTE — PLAN OF CARE
Problem: Discharge Planning  Goal: Discharge to home or other facility with appropriate resources  8/10/2023 1233 by Dangelo Malin RN  Outcome: Progressing  Flowsheets (Taken 8/10/2023 0430 by Vaishali Gandhi, ALF)  Discharge to home or other facility with appropriate resources:   Identify barriers to discharge with patient and caregiver   Identify discharge learning needs (meds, wound care, etc)  8/10/2023 0107 by Juliet Becker RN  Outcome: Progressing     Problem: Pain  Goal: Verbalizes/displays adequate comfort level or baseline comfort level  8/10/2023 1233 by Dangelo Malin RN  Outcome: Progressing  Flowsheets (Taken 8/10/2023 0400 by Vaishali Gandhi, ALF)  Verbalizes/displays adequate comfort level or baseline comfort level:   Assess pain using appropriate pain scale   Encourage patient to monitor pain and request assistance   Administer analgesics based on type and severity of pain and evaluate response   Implement non-pharmacological measures as appropriate and evaluate response   Consider cultural and social influences on pain and pain management   Notify Licensed Independent Practitioner if interventions unsuccessful or patient reports new pain  8/10/2023 0107 by Juliet Becker RN  Outcome: Progressing     Problem: Skin/Tissue Integrity  Goal: Absence of new skin breakdown  Description: 1. Monitor for areas of redness and/or skin breakdown  2. Assess vascular access sites hourly  3. Every 4-6 hours minimum:  Change oxygen saturation probe site  4. Every 4-6 hours:  If on nasal continuous positive airway pressure, respiratory therapy assess nares and determine need for appliance change or resting period.   8/10/2023 1233 by Dangelo Malin RN  Outcome: Progressing  8/10/2023 0107 by Juliet Becker RN  Outcome: Progressing     Problem: Safety - Adult  Goal: Free from fall injury  8/10/2023 1233 by Dangelo Malin RN  Outcome: Progressing  8/10/2023 0107 by Juliet Becker RN  Outcome: Progressing     Problem: ABCDS Injury Assessment  Goal: Absence of physical injury  8/10/2023 1233 by Ginger Ohara RN  Outcome: Progressing  8/10/2023 0107 by German Cáhvez RN  Outcome: Progressing     Problem: Nutrition Deficit:  Goal: Optimize nutritional status  8/10/2023 1233 by Ginger Ohara RN  Outcome: Progressing  8/10/2023 0929 by Samantha Vaughan RD  Outcome: Not Progressing  Flowsheets (Taken 8/10/2023 9393)  Nutrient intake appropriate for improving, restoring, or maintaining nutritional needs:   Assess nutritional status and recommend course of action   Recommend, monitor, and adjust tube feedings and TPN/PPN based on assessed needs   Monitor oral intake, labs, and treatment plans   Recommend appropriate diets, oral nutritional supplements, and vitamin/mineral supplements  8/10/2023 0107 by German Chávez RN  Outcome: Progressing     Problem: Chronic Conditions and Co-morbidities  Goal: Patient's chronic conditions and co-morbidity symptoms are monitored and maintained or improved  8/10/2023 1233 by Ginger Ohara RN  Outcome: Progressing  Flowsheets (Taken 8/10/2023 0430 by Kam Booth RN)  Care Plan - Patient's Chronic Conditions and Co-Morbidity Symptoms are Monitored and Maintained or Improved: Monitor and assess patient's chronic conditions and comorbid symptoms for stability, deterioration, or improvement  8/10/2023 0107 by German Chávez RN  Outcome: Progressing     Problem: Nutrition Deficit:  Goal: Optimize nutritional status  8/10/2023 1233 by Ginger Ohraa RN  Outcome: Progressing  8/10/2023 0929 by Samantha Vaughan RD  Outcome: Not Progressing  Flowsheets (Taken 8/10/2023 3509)  Nutrient intake appropriate for improving, restoring, or maintaining nutritional needs:   Assess nutritional status and recommend course of action   Recommend, monitor, and adjust tube feedings and TPN/PPN based on assessed needs   Monitor oral intake, labs, and treatment plans

## 2023-08-10 NOTE — PROGRESS NOTES
[address] [facility]  DEPARTMENT OF SPEECH/LANGUAGE PATHOLOGY  DAILY PROGRESS NOTE  Riaz Bailey  8/10/2023  [id number]  Lung mass [R91.8]  Hypoxia [R09.02]  New onset a-fib [facility]) [I48.91]  Acute on chronic respiratory failure (HCC) [J96.20]  Pneumonia of right lung due to infectious organism, unspecified part of lung [J18.9]  No Known Allergies      Pt was seen this date for dysphagia treatment. IMPRESSION AND RECOMMENDATIONS:   Pt was seen for ongoing dysphagia evaluation. He was seated upright in bed, alert. Head forward and rotated to right at rest and pt did not follow directions to improve positioning. Baseline vocal quality improved, reduced loudness impacting speech intelligibility. Volitional cough weak/dry. Pt presented with PO trials of ice chips and puree. Oral stage moderate-severely impaired, characterized by reduced mandibular ROM, decreased labial seal, slow/reduced oral manipulation, and suspected premature pharyngeal entry with ice chips. Suspect ongoing significant pharyngeal dysphagia with delayed swallow initiation, reduced laryngeal elevation, and immediate wet cough following all trials of ice chips. Wet vocal quality noted following all trials. Pt cued for volitional cough/swallow but was not able to follow those commands effectively. Pt declined further PO trials and session was concluded. Recommend continued NPO. SLP will monitor chart and follow-up for reassessment as indicated. GOALS (current status in bold):  Short-term Goals  Timeframe for Short-term Goals: LOS  Goal 1: Pt will participate in repeat bedside swallow evaluation for potential diet initiation, as able. Targeted, not appropriate for diet initiation, continue  Goal 2: Pt/caregivers will inidicate understanding of education and recommendations.  Meeting, d/w RN    EDUCATION: recommendations/POC    PAIN RATING (0-10 Scale): appears comfortable, does not rate  Time in/Time out: SLP Individual

## 2023-08-10 NOTE — PROGRESS NOTES
Head 6/8/2023 was negative for metastatic disease although initiation of treatment was delayed by patient. He did recently initiate chemoradiation with first radiation on 7/20 and C2D1 Carbo/Taxol on 7/21. It has been <2 weeks since initiation of treatment and progression seen on CT does not represent failure of treatment. -CT Head concerning for watershed infarct vs metastasis. Prognostication from a malignancy perspective highly dependent on results of this. With known information he has potentially controllable disease, prognosis is dependent on recovery from acute illness--he is very ill from a comorbidity standpoint. Met sister on 8/3 who wants to continue with present care. 8/4/2023 extubated. He is at risk for deterioration. Family decided for hospice evaluation. I discussed with Dr Zeinab Carlson. I appreciated his input. Reports sister canceled hospice meeting yesterday, she was working. 8/10 Discussed with pt, sister, and Dr Sahra Champion rationale for hospice and that he is likely now a candidate for inpatient given reliance on parenteral glucose. This seems to be in line with both of their stated wishes, she believes he has indicated to her that he is suffering. Dr Sahra Champion will contact  to set up new meeting. 2. Anemia  Known anemia of chronic diease, expect some decline with treatment. Continue to trend. Transfusion support for Hgb <7. Last required transfusion on 8/4 for Hgb 6.0. 8/7/2023 Hg 7.9.  8/8/2023 WBC 16.3, Hg 7.5, MCV 89.1, plat 176. He has reactive leukocytosis. 8/9/2023 WBC 18.1, Hg 7.4, plat 243.  8/10/2023 WBC 15.4 Hgb 7.2 Plt 262k    3. Neurology following for CVA. He notes he has not been compliant with aspirin for past CVA. Rest of care per primary team. On ASA. MRI brain was not completed, unlikely to change plan now. 4. Post obstructive PNA, ?aspiration. Pulmonologist on board, on broad spectrum antibiotics.       Patient was seen independently and plan of care was

## 2023-08-10 NOTE — PROGRESS NOTES
Comprehensive Nutrition Assessment    Type and Reason for Visit:  Reassess    Nutrition Recommendations/Plan:   May provide EN if within POC, recommend Vital AF (peptide based formula) @ 50mL/hr to provide 1440kcal and 90g PRO (recommend d/c IV dextrose if TF started)  If patient without IVF, recommend 105ml FWF Q6H to provide a total of 1500mL fluids per day  Monitor for diet advancement, weights, labs, POC     Malnutrition Assessment:  Malnutrition Status:  Severe malnutrition (07/31/23 1316)    Context:  Chronic Illness       Nutrition Assessment:    Pt sleeping, remains NPO per SLP, this is day 6 w/o significant source of nutrition. Noted hospice consult in place, awaiting family to arrive to discuss POC. If nutrition support if within POC, will provide recommendations. Continue to follow at high nutrition risk. Nutrition Related Findings:    D10W, glucose , hgb 7.2 Wound Type: Pressure Injury, Multiple       Current Nutrition Intake & Therapies:    Average Meal Intake: NPO  Average Supplements Intake: NPO  Diet NPO  Additional Calorie Sources:  propofol stopped  816kcal from dex    Anthropometric Measures:  Height: 5' 9\" (175.3 cm)  Ideal Body Weight (IBW): 160 lbs (73 kg)    Admission Body Weight: 99 lb 3.3 oz (45 kg) (bedscale)  Current Body Weight: 104 lb 15 oz (47.6 kg),   IBW.  Weight Source: Bed Scale  Current BMI (kg/m2): 15.5  Usual Body Weight: 128 lb (58.1 kg) (4/12/23)  % Weight Change (Calculated): -22.5  Weight Adjustment For: No Adjustment                 BMI Categories: Underweight (BMI less than 18.5)    Estimated Daily Nutrient Needs:  Energy Requirements Based On: Kcal/kg  Weight Used for Energy Requirements: Current  Energy (kcal/day): 3086-3954 (30-35kcal/kg)  Weight Used for Protein Requirements: Ideal  Protein (g/day): (1.2-1.4g/kg IBW)  Method Used for Fluid Requirements: 1 ml/kcal  Fluid (ml/day): 1500    Nutrition Diagnosis:   Severe malnutrition, In context of chronic

## 2023-08-10 NOTE — PROGRESS NOTES
4 Eyes Skin Assessment     NAME:  Zannie Lesches  YOB: 1965  MEDICAL RECORD NUMBER:  [id number]    The patient is being assessed for  Transfer to New Unit    I agree that at least one RN has performed a thorough Head to Toe Skin Assessment on the patient. ALL assessment sites listed below have been assessed. Areas assessed by both nurses:    Head, Face, Ears, Shoulders, Back, Chest, Arms, Elbows, Hands, Sacrum. Buttock, Coccyx, Ischium, Legs. Feet and Heels, and Under Medical Devices         Does the Patient have a Wound? Yes wound(s) were present on assessment.  LDA wound assessment was Initiated and completed by RN       Cuco Prevention initiated by RN: Yes  Wound Care Orders initiated by RN: Yes    Pressure Injury (Stage 3,4, Unstageable, DTI, NWPT, and Complex wounds) if present, place Wound referral order by RN under : Yes    New Ostomies, if present place, Ostomy referral order under : No     Nurse 1 eSignature: Electronically signed by Aurther Schilder, RN on 8/10/23 at 12:09 AM EDT    **SHARE this note so that the co-signing nurse can place an eSignature**    Nurse 2 eSignature: Electronically signed by Kenneth Trujillo RN on 8/10/23 at 12:56 AM EDT

## 2023-08-10 NOTE — PROGRESS NOTES
Rapid Resource RN to bedside for eval d/t DI score of 54. Discussed care with bedside RN , patient is being moved to step down unit for NIHSS assessments every shift. Patient arouses to voice, denies pain. Assessed Vital signs noting  on portable no tele ordered. Last EKG was 8/5, pt has known tachycardia however since rate has increased this RN suggested EKG. Bedside RN Monserrat Flores NP Jen Rai to request order.

## 2023-08-11 PROBLEM — R26.9 GAIT DISTURBANCE: Status: RESOLVED | Noted: 2023-01-01 | Resolved: 2023-01-01

## 2023-08-11 PROBLEM — E43 SEVERE MALNUTRITION (HCC): Status: RESOLVED | Noted: 2023-01-01 | Resolved: 2023-01-01

## 2023-08-11 PROBLEM — I63.9 ISCHEMIC STROKE (HCC): Status: ACTIVE | Noted: 2023-01-01

## 2023-08-11 PROBLEM — I63.9 STROKE DETERMINED BY CLINICAL ASSESSMENT (HCC): Status: RESOLVED | Noted: 2023-01-01 | Resolved: 2023-01-01

## 2023-08-11 PROBLEM — I10 ESSENTIAL HYPERTENSION: Status: RESOLVED | Noted: 2023-01-01 | Resolved: 2023-01-01

## 2023-08-11 PROBLEM — Z51.5 HOSPICE CARE: Status: ACTIVE | Noted: 2023-01-01

## 2023-08-11 PROBLEM — F43.23 ADJUSTMENT DISORDER WITH MIXED ANXIETY AND DEPRESSED MOOD: Status: RESOLVED | Noted: 2023-01-01 | Resolved: 2023-01-01

## 2023-08-11 PROBLEM — I63.9 ACUTE ISCHEMIC STROKE (HCC): Status: RESOLVED | Noted: 2022-06-24 | Resolved: 2023-01-01

## 2023-08-11 NOTE — PROGRESS NOTES
V2.0    [facility] Progress Note      Name:  Amol Mariano /Age/Sex: 1965  (63 y.o. male)   MRN & CSN:  [id number] & [id number] Encounter Date/Time: 2023 1:05 PM EDT   Location:  -A PCP: No primary care provider on file. Paz Tirado MD       Hospital Day: 13    Assessment and Recommendations   Amol Mariano is a 63 y.o. male with pmh of  who presents with Squamous cell carcinoma of right lung ([address])      Plan:   Acute metabolic encephalopathy secondary to acute ischemic right MCA CVA:  -Patient alert and oriented but waxing and waning mental status on admission,  has left-sided paraplegia, L WK 3-3.5 days prior to admission, NIH on admission 11, CT head with loss of ray-white differentiation in R. MCA territory. CTA head and neck with Right M1 MCA occlusion, severe R ICA stenosis, unchanged moderate P2 PCA severe narrowing. CTPA showed possible R MCA thrombosis. Patient on ASA, statin, neurochecks. Given poor mental status, decline in p.o. intake and patient's previous wishes plan for hospice consultation. Sister aware of prognosis and what she wants to pursue comfort measures for her brother, pending arrangements to hospice. Acute hypoxemic respiratory failure likely secondary to worsening size and extension of the right upper lobe possible aspiration pneumonitis versus HCAP pneumonia:  -VBG on admission with pH 7.42/PCO2 46/bicarb 29 on Vapotherm  -CTA chest withincreased size of RUL mass, invading the chest wall and nearby rib, probable consolidation distally, debris in trachea.   -De-escalated to his Rocephin as per critical care and completed course. Pericardial effusion:  -Cardiology following  -Stat TTE EF greater than 65%, loculated pericardial effusion near the right ventricle, moderately increased in size compared to the previous echo 2023. No tamponade physiology noted.   -TTE from  did show small pericardial effusion at the time but no VESSELS: No dissection or arterial injury. No significant stenosis of the brachiocephalic or subclavian arteries. CAROTID ARTERIES: No dissection, arterial injury, or hemodynamically significant stenosis by NASCET criteria. VERTEBRAL ARTERIES: No dissection, arterial injury, or significant stenosis. SOFT TISSUES: Partially imaged right upper lobe mass. No cervical or superior mediastinal lymphadenopathy. The larynx and pharynx are unremarkable. No acute abnormality of the salivary and thyroid glands. BONES: Partially imaged osseous erosion of the partially imaged ribs adjacent to the mass. No acute osseous abnormality. CTA HEAD: ANTERIOR CIRCULATION: Right M1 MCA occlusion (series 320, image 55). Severe stenosis right cavernous ICA, new . Otherwise no significant stenosis of the intracranial internal carotid, anterior cerebral, or middle cerebral arteries. No aneurysm. POSTERIOR CIRCULATION: Moderate to severe narrowing of the left P2 PCA is unchanged. Otherwise no significant stenosis of the vertebral, basilar, or posterior cerebral arteries. No aneurysm. OTHER: No dural venous sinus thrombosis on this non-dedicated study. BRAIN: Right MCA distribution acute infarct. No mass effect or midline shift. No extra-axial fluid collection. Right M1 MCA occlusion, new. Right MCA distribution acute infarct. Intracranially, severe stenosis right cavernous ICA, new. Intracranially, moderate to severe narrowing of the left P2 PCA is unchanged. No vascular findings in the CTA neck. Partially imaged right upper lobe mass with adjacent rib erosion, suspicious for malignancy. Critical results were called by Dr. Tai Sy to Dr. Earle Steven on 7/31/2023 at 00:16.        CBC:   Recent Labs     08/09/23  0510 08/10/23  0215   WBC 18.1* 15.4*   HGB 7.4* 7.2*    262       BMP:    Recent Labs     08/08/23  2141 08/09/23  0900   * 134*   K 3.4* 3.5    105   CO2 20* 19*   BUN 5* 6   CREATININE 0.7* 0.7*

## 2023-08-11 NOTE — PLAN OF CARE
Problem: Discharge Planning  Goal: Discharge to home or other facility with appropriate resources  8/11/2023 1633 by Ella Cueva RN  Outcome: Adequate for Discharge  8/11/2023 1040 by Ella Cueva RN  Outcome: Progressing     Problem: Pain  Goal: Verbalizes/displays adequate comfort level or baseline comfort level  8/11/2023 1633 by Ella Cueva RN  Outcome: Adequate for Discharge  8/11/2023 1040 by Ella Cueva RN  Outcome: Progressing  Flowsheets (Taken 8/11/2023 0001 by Elisabet Haskins RN)  Verbalizes/displays adequate comfort level or baseline comfort level:   Encourage patient to monitor pain and request assistance   Assess pain using appropriate pain scale   Administer analgesics based on type and severity of pain and evaluate response   Implement non-pharmacological measures as appropriate and evaluate response   Consider cultural and social influences on pain and pain management   Notify Licensed Independent Practitioner if interventions unsuccessful or patient reports new pain     Problem: Skin/Tissue Integrity  Goal: Absence of new skin breakdown  Description: 1. Monitor for areas of redness and/or skin breakdown  2. Assess vascular access sites hourly  3. Every 4-6 hours minimum:  Change oxygen saturation probe site  4. Every 4-6 hours:  If on nasal continuous positive airway pressure, respiratory therapy assess nares and determine need for appliance change or resting period.   8/11/2023 1633 by Ella Cueva RN  Outcome: Adequate for Discharge  8/11/2023 1040 by Ella Cueva RN  Outcome: Progressing     Problem: Safety - Adult  Goal: Free from fall injury  8/11/2023 1633 by Ella Cueva RN  Outcome: Adequate for Discharge  8/11/2023 1040 by Ella Cueva RN  Outcome: Progressing     Problem: ABCDS Injury Assessment  Goal: Absence of physical injury  8/11/2023 1633 by Ella Cueva RN  Outcome: Adequate for Discharge  8/11/2023 1040 by Ella Cueva RN  Outcome: Progressing     Problem: Nutrition

## 2023-08-11 NOTE — PROGRESS NOTES
Hematology/Oncology   Progress Note    Patient Name:  Zannie Lesches  Patient :  1965  Patient MRN:  [id number]     Primary Oncologist: Dr Ania Chapman  Referring Provider: No primary care provider on file. Date of Service: 2023      HPI:   Zannie Lesches is a pleasant 63 y.o. male known to oncology in treatment for Squamous Cell Carcinoma with recent initiation of chemoradiation with radiation starting 2023 weekly Carbo/Taxol last receiving C2D1 2023. He presented to the ER with respiratory distress and AMS with oxygen saturations on the 70s on EMS arrival to home. Stroke alert was called with workup consistent for R M1 MCA occlusion, not a tPA candidate. CT was unable to r/o brain metastases, non-specific low attenuation could be watershed infarct vs metastasis. MRI is pending although was negative in early . CTA of the chest was significant for enlargement of RUL mass with progressive chest invasion and bony erosion. Also notes area of concern in left lung which did not seem metastatic on previous PET, ?larger than prior. Debris in trachea noted. He is on Zosyn for post-obstructive pneumonia. He is alert and oriented on exam today with L sided weakness. Answers briefly with no elaboration. Mouth dry. Fatigued, mostly wishes to rest on exam. Afebrile and HDS. Pt was aware about h.o CVA in . Not on ASA. 2023 Hgb 6.0. Received 1u PRBC. Leukocytosis improved to 15, Plt WNL. 2023 he responded to verbal stimuli. S/p extubation on 2023. WBC 15.3, Hg 7.9, MCV 88.6, plat 146.     2023 he remains lethargic. Family decided to have hospice evaluation. 2023 WBC 16.3, Hg 7.5, MCV 89.1, plat 176. On O2 via NC. Dr Hannah aBdillo called me and I appreciated his input. 2023 he is more awake and alert. Follows command. Reports by the nurse, sister canceled hospice meeting. He is hypoglycemic and on D5 continues IV. I discuss with Dr Hannah Badillo and Dr Sara Aguila.   notes he has not been compliant with aspirin for past CVA. Rest of care per primary team. On ASA. MRI brain was not completed, unlikely to change plan now. 4. Post obstructive PNA, ?aspiration. Pulmonologist on board, on broad spectrum antibiotics. Patient was seen independently and plan of care was developed with attending physician Dr Zenaida Adams PA-C    Imaging and labs reviewed and plan of care discussed with patient and sister in depth. We will follow along. Please call with any questions.

## 2023-08-11 NOTE — PROGRESS NOTES
[facility] Rn reviewed patient's chart d/t DI score of 67. This RN evaluated pt last shift with interventions initiated. Per review of notes , discussion was held with family and they plan to transition patient to comfort care tele and blood draws have been d/c.

## 2023-08-11 NOTE — PLAN OF CARE
Problem: Discharge Planning  Goal: Discharge to home or other facility with appropriate resources  8/11/2023 1633 by Mahin Grier RN  Outcome: Completed  8/11/2023 1633 by Mahin Grier RN  Outcome: Adequate for Discharge  8/11/2023 1040 by Mahin Grier RN  Outcome: Progressing     Problem: Pain  Goal: Verbalizes/displays adequate comfort level or baseline comfort level  8/11/2023 1633 by Mhain Grier RN  Outcome: Completed  8/11/2023 1633 by Mahin Grier RN  Outcome: Adequate for Discharge  8/11/2023 1040 by Mahin Grier RN  Outcome: Progressing  Flowsheets (Taken 8/11/2023 0001 by Nohemi May RN)  Verbalizes/displays adequate comfort level or baseline comfort level:   Encourage patient to monitor pain and request assistance   Assess pain using appropriate pain scale   Administer analgesics based on type and severity of pain and evaluate response   Implement non-pharmacological measures as appropriate and evaluate response   Consider cultural and social influences on pain and pain management   Notify Licensed Independent Practitioner if interventions unsuccessful or patient reports new pain     Problem: Skin/Tissue Integrity  Goal: Absence of new skin breakdown  Description: 1. Monitor for areas of redness and/or skin breakdown  2. Assess vascular access sites hourly  3. Every 4-6 hours minimum:  Change oxygen saturation probe site  4. Every 4-6 hours:  If on nasal continuous positive airway pressure, respiratory therapy assess nares and determine need for appliance change or resting period.   8/11/2023 1633 by Mahin Grier RN  Outcome: Completed  8/11/2023 1633 by Mahin Grier RN  Outcome: Adequate for Discharge  8/11/2023 1040 by Mahin Grier RN  Outcome: Progressing     Problem: Safety - Adult  Goal: Free from fall injury  8/11/2023 1633 by Mahin Grier RN  Outcome: Completed  8/11/2023 1633 by Mahin Grier RN  Outcome: Adequate for Discharge  8/11/2023 1040 by Mahin Grier RN  Outcome: Progressing Problem: ABCDS Injury Assessment  Goal: Absence of physical injury  8/11/2023 1633 by Ginger Ohara RN  Outcome: Completed  8/11/2023 1633 by Ginger Ohara RN  Outcome: Adequate for Discharge  8/11/2023 1040 by Ginger Ohara RN  Outcome: Progressing     Problem: Nutrition Deficit:  Goal: Optimize nutritional status  8/11/2023 1633 by Ginger Ohara RN  Outcome: Completed  8/11/2023 1633 by Ginger Ohara RN  Outcome: Adequate for Discharge  8/11/2023 1040 by Ginger Ohara RN  Outcome: Progressing     Problem: Chronic Conditions and Co-morbidities  Goal: Patient's chronic conditions and co-morbidity symptoms are monitored and maintained or improved  8/11/2023 1633 by Ginger Ohara RN  Outcome: Completed  8/11/2023 1633 by Ginger Ohara RN  Outcome: Adequate for Discharge  8/11/2023 1040 by Ginger Ohara RN  Outcome: Progressing     Problem: Confusion  Goal: Confusion, delirium, dementia, or psychosis is improved or at baseline  Description: INTERVENTIONS:  1. Assess for possible contributors to thought disturbance, including medications, impaired vision or hearing, underlying metabolic abnormalities, dehydration, psychiatric diagnoses, and notify attending LIP  2. [place] high risk fall precautions, as indicated  3. Provide frequent short contacts to provide reality reorientation, refocusing and direction  4. Decrease environmental stimuli, including noise as appropriate  5. Monitor and intervene to maintain adequate nutrition, hydration, elimination, sleep and activity  6. If unable to ensure safety without constant attention obtain sitter and review sitter guidelines with assigned personnel  7.  Initiate Psychosocial CNS and Spiritual Care consult, as indicated  8/11/2023 1633 by Ginger Ohara RN  Outcome: Completed  8/11/2023 1633 by Ginger Ohara RN  Outcome: Adequate for Discharge  8/11/2023 1040 by Ginger Ohara RN  Outcome: Progressing

## 2023-08-11 NOTE — PROGRESS NOTES
[facility]  Progress Note    Date: 8/11/2023  Name: Poncho Vickers  MRN: [id number]  YOB: 1965   Patient's PCP: No primary care provider on file. Consultants during acute care: Cardiology, [facility], Neurology, [facility]   Referring Physician: ALANA Grant  Acute care admission date: 7/30/2023     Mechanical ventilation: 7/31 to 8/4/23     Subjective: The patient is bit more responsive this morning, opens his eyes, mumbles. He says he is 40year old. He continues with secretions, not eating, and is on D10 infusion. There are no family here. I collaborated with Dr. Radha Hess. A meeting with the patient's sister (next of kin) is scheduled for 8/11/23 at  to discuss hospice and if consents would plan on admission. to [address] for management of congestion, pain, dyspnea and probable end of life care. Objective:      Data reviewed 8/11/2023:   Transthoracic Echocardiogram 8/1/23   This is a limited echo to rule out tamponde. Left ventricular systolic function is normal.   Ejection fraction is visually estimated at 55-60%. Moderate loculated pericardial effusion notedanteriorly; no evidence of   tamponade physiology. 6/8/23 MRI brain without evidence of metastatic disease. 6/7/2023 CTA Chest  No pulmonary embolism. Enlarging right upper lobe centrally necrotic mass suggesting progression of primary malignancy. There is peripheral airspace consolidation suggesting postobstructive pneumonia. Spiculated left upper lobe 14 mm nodule and sub solid left upper lobe 15 mm pulmonary nodule are stable since prior examination that may represent stable metastases. Severe emphysematous changes with findings of COPD     Final Pathologic Diagnosis   A. Lung mass, right upper lobe; CT guided needle biopsy:   -     MODERATELY DIFFERENTIATED SQUAMOUS CELL CARCINOMA WITH   NECROSIS        SEE COMMENT.    B.  Lung mass, right

## 2023-08-11 NOTE — PLAN OF CARE
Problem: Discharge Planning  Goal: Discharge to home or other facility with appropriate resources  Outcome: Progressing     Problem: Pain  Goal: Verbalizes/displays adequate comfort level or baseline comfort level  Outcome: Progressing  Flowsheets (Taken 8/11/2023 0001 by Jennifer Moreno RN)  Verbalizes/displays adequate comfort level or baseline comfort level:   Encourage patient to monitor pain and request assistance   Assess pain using appropriate pain scale   Administer analgesics based on type and severity of pain and evaluate response   Implement non-pharmacological measures as appropriate and evaluate response   Consider cultural and social influences on pain and pain management   Notify Licensed Independent Practitioner if interventions unsuccessful or patient reports new pain     Problem: Skin/Tissue Integrity  Goal: Absence of new skin breakdown  Description: 1. Monitor for areas of redness and/or skin breakdown  2. Assess vascular access sites hourly  3. Every 4-6 hours minimum:  Change oxygen saturation probe site  4. Every 4-6 hours:  If on nasal continuous positive airway pressure, respiratory therapy assess nares and determine need for appliance change or resting period. Outcome: Progressing     Problem: Safety - Adult  Goal: Free from fall injury  Outcome: Progressing     Problem: ABCDS Injury Assessment  Goal: Absence of physical injury  Outcome: Progressing     Problem: Nutrition Deficit:  Goal: Optimize nutritional status  Outcome: Progressing     Problem: Chronic Conditions and Co-morbidities  Goal: Patient's chronic conditions and co-morbidity symptoms are monitored and maintained or improved  Outcome: Progressing     Problem: Confusion  Goal: Confusion, delirium, dementia, or psychosis is improved or at baseline  Description: INTERVENTIONS:  1.  Assess for possible contributors to thought disturbance, including medications, impaired vision or hearing, underlying metabolic abnormalities,

## 2023-08-12 NOTE — DISCHARGE SUMMARY
V2.0  Discharge Summary    Name:  Arlene Mcduffie /Age/Sex: 1965 (63 y.o. male)   Admit Date: 2023  Discharge Date: 23    MRN & CSN:  [id number] & [id number] Encounter Date and Time 23 2:00 PM EDT    Attending:  No att. providers found Discharging Provider: Mary Berger MD       Hospital Course:     Brief HPI:Wild Cota is a 63 y.o. male with past medical history of  hypertension, hyperlipidemia, Ischemic CVA, COPD/Emphysema, R. Lung SCC, chronic hepatitis C  who presents with respiratory distress and AMS. Patient presented with severe respiratory distress from home via EMS. Reportedly was found hypoxic with SPO2 in 70s. Started on NIPPV. Requested for inpatient admission. During my evaluation patient was found to have flaccid paralysis L. Side. Stroke alert was called. During this time family also came by and further questioning revealed that [address] was 3-3.5 days ago. Patient apparently lives with nephew and he tells me that around 3 days ago he stopped talking, has been sitting on the couch and not eating or drinking anything since then. Brief Problem Based Course:     Acute metabolic encephalopathy secondary to acute ischemic right MCA CVA:  -Patient alert and oriented but waxing and waning mental status on admission,  has left-sided paraplegia, L WK 3-3.5 days prior to admission, NIH on admission 11, CT head with loss of ray-white differentiation in R. MCA territory. CTA head and neck with Right M1 MCA occlusion, severe R ICA stenosis, unchanged moderate P2 PCA severe narrowing. CTPA showed possible R MCA thrombosis. Patient on ASA, statin, neurochecks. Given poor mental status, decline in p.o. intake and patient's previous wishes plan for hospice consultation. Sister aware of prognosis and what she wants to pursue comfort measures for her brother as per his previous wishes and patient discharged to inpatient hospice.      Acute hypoxemic respiratory failure likely secondary to exam:->worsening tachypnea Reason for Exam: worsening tachypnea FINDINGS: There is a large right upper lobe mass again demonstrated. Removal of ET tube since prior. Other lines and tubes are stable. Patchy left lung airspace is again demonstrated with improvement of the previous seen left pleural effusion. Cardiomediastinal silhouette and bony thorax are unchanged. Removal of ET tube since prior. Persistent large right upper lobe mass. Irregular left lung airspace disease is again noted with decreased size of the layering left pleural effusion.        CBC:   Recent Labs     08/10/23  0215   WBC 15.4*   HGB 7.2*          Lipids:   Lab Results   Component Value Date/Time    CHOL 153 07/31/2023 04:45 AM    HDL 36 07/31/2023 04:45 AM    TRIG 159 08/07/2023 04:20 AM     Hemoglobin A1C:   Lab Results   Component Value Date/Time    LABA1C 5.4 07/31/2023 04:45 AM       Troponin:   Lab Results   Component Value Date/Time    TROPONINT <0.010 07/30/2023 06:29 PM    TROPONINT <0.010 06/07/2023 04:00 PM    TROPONINT <0.010 03/28/2023 01:10 AM     UA:  Lab Results   Component Value Date/Time    NITRU NEGATIVE 08/01/2023 01:26 PM    COLORU YELLOW 08/01/2023 01:26 PM    WBCUA 8 08/01/2023 01:26 PM    RBCUA 4 08/01/2023 01:26 PM    MUCUS RARE 06/07/2023 08:23 PM    TRICHOMONAS NONE SEEN 08/01/2023 01:26 PM    BACTERIA OCCASIONAL 08/01/2023 01:26 PM    CLARITYU CLEAR 08/01/2023 01:26 PM    SPECGRAV 1.010 08/01/2023 01:26 PM    LEUKOCYTESUR NEGATIVE 08/01/2023 01:26 PM    UROBILINOGEN 4.0 08/01/2023 01:26 PM    BILIRUBINUR NEGATIVE 08/01/2023 01:26 PM    BLOODU SMALL NUMBER OR AMOUNT OBSERVED 08/01/2023 01:26 PM    KETUA NEGATIVE 08/01/2023 01:26 PM    AMORPHOUS RARE 08/01/2023 01:26 PM         Time Spent Discharging patient 39 minutes    Electronically signed by Alicia Duffy MD on 8/12/2023 at 3:10 PM

## 2023-08-12 NOTE — PROGRESS NOTES
[facility]  General Inpatient Hospice Progress Note    Date: 8/12/2023  Name: Ciara Monaco  MRN: [id number]  YOB: 1965   Patient's PCP: No primary care provider on file. Consultants during acute care: Cardiology, [address] [place] [address], Neurology, [facility]   Referring Physician: ALANA Smith  Acute care admission date: 7/30 to 8/11/23  Admit to General Inpatient Hospice: 8/11/2023      Mechanical ventilation: 7/31 to 8/4/23     Subjective: The patient is unresponsive, eyes are closed, no facial grimacing. There are no family here. I collaborated with the patient's nurse and the hospice nurse. Objective:   Pain is managed with Morphine IV prn with 5 x 4 mg doses in the past 24 hours, Glycopyrrolate IV is available for secretions, and Lorazepam is available for anxiety with 3 doses in the past 24 hours. Data reviewed 8/12/2023:   Transthoracic Echocardiogram 8/1/23   This is a limited echo to rule out tamponde. Left ventricular systolic function is normal.   Ejection fraction is visually estimated at 55-60%. Moderate loculated pericardial effusion notedanteriorly; no evidence of   tamponade physiology. 6/8/23 MRI brain without evidence of metastatic disease. 6/7/2023 CTA Chest  No pulmonary embolism. Enlarging right upper lobe centrally necrotic mass suggesting progression of primary malignancy. There is peripheral airspace consolidation suggesting postobstructive pneumonia. Spiculated left upper lobe 14 mm nodule and sub solid left upper lobe 15 mm pulmonary nodule are stable since prior examination that may represent stable metastases. Severe emphysematous changes with findings of COPD     Final Pathologic Diagnosis   A. Lung mass, right upper lobe; CT guided needle biopsy:   -     MODERATELY DIFFERENTIATED SQUAMOUS CELL CARCINOMA WITH   NECROSIS        SEE COMMENT.    B.  Lung mass, right upper lobe; CT guided aspirate for cell

## 2023-08-12 NOTE — PLAN OF CARE
Problem: Discharge Planning  Goal: Discharge to home or other facility with appropriate resources  Outcome: Progressing  Flowsheets (Taken 8/11/2023 2047)  Discharge to home or other facility with appropriate resources: Identify barriers to discharge with patient and caregiver     Problem: Safety - Adult  Goal: Free from fall injury  Outcome: Progressing     Problem: Pain  Goal: Verbalizes/displays adequate comfort level or baseline comfort level  Outcome: Progressing  Flowsheets (Taken 8/11/2023 2047)  Verbalizes/displays adequate comfort level or baseline comfort level: Encourage patient to monitor pain and request assistance     Problem: Skin/Tissue Integrity  Goal: Absence of new skin breakdown  Description: 1. Monitor for areas of redness and/or skin breakdown  2. Assess vascular access sites hourly  3. Every 4-6 hours minimum:  Change oxygen saturation probe site  4. Every 4-6 hours:  If on nasal continuous positive airway pressure, respiratory therapy assess nares and determine need for appliance change or resting period.   Outcome: Progressing

## 2023-08-13 LAB
CULTURE: NORMAL
CULTURE: NORMAL
Lab: NORMAL
Lab: NORMAL
SPECIMEN: NORMAL
SPECIMEN: NORMAL

## 2023-08-13 NOTE — DISCHARGE SUMMARY
[address] [facility]    Death Summary    Date: 8/12/2023  Name: Jaymie Zarco  MRN: [id number]  YOB: 1965     Patient's PCP: No primary care provider on file. Consultants during acute care: Cardiology, [facility], Neurology, [facility]   Acute care admission date: 7/30 to 8/11/23  Admit Date: 8/11/2023 to [address]  Date of Death: 8/12/2023  Time: 2318  Admitting Physician: Israel Duran MD to [address]   Discharge Physician: Titus Eldridge MD  Consultation: none during General Inpatient Hospice stay    Invasive procedures: none during General Inpatient Hospice stay; Mechanical ventilation: 7/31 to 8/4/23     Discharge Diagnoses:  Acute metabolic encephalopathy with Non Small Cell lung cancer and 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 Prognosis is grim. [address] for management of congestion, pain, dyspnea and end of life care. 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 No plan for additional cancer directed treatment based on current status, comorbid illnesses and poor functional status. Ischemic right middle cerebral artery distribution cerebrovascular accident with left hemiparesis and dysphagia.    Acute hypoxic respiratory failure, extubated 8/4/23, probable post obstructive pneumonitis vs aspiration pneumonitis vs lung cancer  Hypoglycemia, unable to safely eat  Leukocytosis      Patient Active Problem List   Diagnosis Code    Lung mass R91.8    Pneumonia of right lung due to infectious organism J18.9    Severe protein-calorie malnutrition ([address]) M29    Metabolic encephalopathy P33.45    Left-sided weakness R53.1    Acute kidney injury (SEAN) with acute tubular necrosis (ATN) (HCC) N17.0    Chronic obstructive pulmonary disease with acute

## 2023-08-13 NOTE — PROGRESS NOTES
This RN noted the patient not breathing during 23:00 rounds, patient was assessed at the time, but no palpable pulse. Confirmed by Secondary RN no apical pulse heard on ausculation at  23:18. Physician notified of TOD at 23:31. Patient's sister Marilyn Mar) was notified at 23:38 about patient expiration. Patient family arrived at bedside around 00:20. Sister Marilyn Mar), \"stated they have not made any  parlor arrangements yet, and will need to decide what to do\" . This RN answered all questioned asked by family. Cental line, stearns and all remaining lines removed from patient.

## 2023-08-14 LAB
CULTURE: NORMAL
CULTURE: NORMAL
Lab: NORMAL
Lab: NORMAL
SPECIMEN: NORMAL
SPECIMEN: NORMAL

## 2023-08-28 LAB
BCR/ABL T(9,22): NORMAL
COMMENT: NORMAL

## 2024-02-15 NOTE — CARE COORDINATION
Spoke to Maria A REILLY. Hospice consult called to [facility]; spoke to [facility].  Charleen Scott RN [place]